# Patient Record
Sex: MALE | Race: WHITE | NOT HISPANIC OR LATINO | Employment: OTHER | URBAN - METROPOLITAN AREA
[De-identification: names, ages, dates, MRNs, and addresses within clinical notes are randomized per-mention and may not be internally consistent; named-entity substitution may affect disease eponyms.]

---

## 2020-08-08 ENCOUNTER — HOSPITAL ENCOUNTER (INPATIENT)
Facility: HOSPITAL | Age: 40
LOS: 3 days | Discharge: HOME | End: 2020-08-11
Attending: EMERGENCY MEDICINE | Admitting: INTERNAL MEDICINE

## 2020-08-08 ENCOUNTER — APPOINTMENT (OUTPATIENT)
Dept: GENERAL RADIOLOGY | Facility: HOSPITAL | Age: 40
End: 2020-08-08

## 2020-08-08 DIAGNOSIS — I10 UNCONTROLLED HYPERTENSION: ICD-10-CM

## 2020-08-08 DIAGNOSIS — I21.19 ACUTE MI, INFERIOR WALL (HCC): Primary | ICD-10-CM

## 2020-08-08 LAB
ALBUMIN SERPL-MCNC: 2.6 G/DL (ref 3.5–5.2)
ALBUMIN/GLOB SERPL: 0.9 G/DL
ALP SERPL-CCNC: 62 U/L (ref 39–117)
ALT SERPL W P-5'-P-CCNC: 12 U/L (ref 1–41)
ANION GAP SERPL CALCULATED.3IONS-SCNC: 11.3 MMOL/L (ref 5–15)
APTT PPP: 26.9 SECONDS (ref 22.7–35.4)
AST SERPL-CCNC: 27 U/L (ref 1–40)
B PARAPERT DNA SPEC QL NAA+PROBE: NOT DETECTED
B PERT DNA SPEC QL NAA+PROBE: NOT DETECTED
BASOPHILS # BLD AUTO: 0.07 10*3/MM3 (ref 0–0.2)
BASOPHILS NFR BLD AUTO: 0.7 % (ref 0–1.5)
BILIRUB SERPL-MCNC: <0.2 MG/DL (ref 0–1.2)
BUN SERPL-MCNC: 28 MG/DL (ref 6–20)
BUN/CREAT SERPL: 11.1 (ref 7–25)
C PNEUM DNA NPH QL NAA+NON-PROBE: NOT DETECTED
CALCIUM SPEC-SCNC: 9.1 MG/DL (ref 8.6–10.5)
CHLORIDE SERPL-SCNC: 102 MMOL/L (ref 98–107)
CO2 SERPL-SCNC: 24.7 MMOL/L (ref 22–29)
CREAT SERPL-MCNC: 2.53 MG/DL (ref 0.76–1.27)
DEPRECATED RDW RBC AUTO: 44.3 FL (ref 37–54)
EOSINOPHIL # BLD AUTO: 0.36 10*3/MM3 (ref 0–0.4)
EOSINOPHIL NFR BLD AUTO: 3.7 % (ref 0.3–6.2)
ERYTHROCYTE [DISTWIDTH] IN BLOOD BY AUTOMATED COUNT: 13.3 % (ref 12.3–15.4)
FLUAV H1 2009 PAND RNA NPH QL NAA+PROBE: NOT DETECTED
FLUAV H1 HA GENE NPH QL NAA+PROBE: NOT DETECTED
FLUAV H3 RNA NPH QL NAA+PROBE: NOT DETECTED
FLUAV SUBTYP SPEC NAA+PROBE: NOT DETECTED
FLUBV RNA ISLT QL NAA+PROBE: NOT DETECTED
GFR SERPL CREATININE-BSD FRML MDRD: 28 ML/MIN/1.73
GLOBULIN UR ELPH-MCNC: 2.9 GM/DL
GLUCOSE BLDC GLUCOMTR-MCNC: 144 MG/DL (ref 70–130)
GLUCOSE SERPL-MCNC: 140 MG/DL (ref 65–99)
HADV DNA SPEC NAA+PROBE: NOT DETECTED
HCOV 229E RNA SPEC QL NAA+PROBE: NOT DETECTED
HCOV HKU1 RNA SPEC QL NAA+PROBE: NOT DETECTED
HCOV NL63 RNA SPEC QL NAA+PROBE: NOT DETECTED
HCOV OC43 RNA SPEC QL NAA+PROBE: NOT DETECTED
HCT VFR BLD AUTO: 43 % (ref 37.5–51)
HGB BLD-MCNC: 14.6 G/DL (ref 13–17.7)
HMPV RNA NPH QL NAA+NON-PROBE: NOT DETECTED
HPIV1 RNA SPEC QL NAA+PROBE: NOT DETECTED
HPIV2 RNA SPEC QL NAA+PROBE: NOT DETECTED
HPIV3 RNA NPH QL NAA+PROBE: NOT DETECTED
HPIV4 P GENE NPH QL NAA+PROBE: NOT DETECTED
IMM GRANULOCYTES # BLD AUTO: 0.07 10*3/MM3 (ref 0–0.05)
IMM GRANULOCYTES NFR BLD AUTO: 0.7 % (ref 0–0.5)
INR PPP: 0.91 (ref 0.9–1.1)
LYMPHOCYTES # BLD AUTO: 1.2 10*3/MM3 (ref 0.7–3.1)
LYMPHOCYTES NFR BLD AUTO: 12.2 % (ref 19.6–45.3)
M PNEUMO IGG SER IA-ACNC: NOT DETECTED
MCH RBC QN AUTO: 30.6 PG (ref 26.6–33)
MCHC RBC AUTO-ENTMCNC: 34 G/DL (ref 31.5–35.7)
MCV RBC AUTO: 90.1 FL (ref 79–97)
MONOCYTES # BLD AUTO: 0.75 10*3/MM3 (ref 0.1–0.9)
MONOCYTES NFR BLD AUTO: 7.6 % (ref 5–12)
NEUTROPHILS NFR BLD AUTO: 7.4 10*3/MM3 (ref 1.7–7)
NEUTROPHILS NFR BLD AUTO: 75.1 % (ref 42.7–76)
NRBC BLD AUTO-RTO: 0 /100 WBC (ref 0–0.2)
PLATELET # BLD AUTO: 225 10*3/MM3 (ref 140–450)
PMV BLD AUTO: 10.1 FL (ref 6–12)
POTASSIUM SERPL-SCNC: 4.3 MMOL/L (ref 3.5–5.2)
PROT SERPL-MCNC: 5.5 G/DL (ref 6–8.5)
PROTHROMBIN TIME: 12.2 SECONDS (ref 11.7–14.2)
RBC # BLD AUTO: 4.77 10*6/MM3 (ref 4.14–5.8)
RHINOVIRUS RNA SPEC NAA+PROBE: NOT DETECTED
RSV RNA NPH QL NAA+NON-PROBE: NOT DETECTED
SARS-COV-2 RNA PNL SPEC NAA+PROBE: NOT DETECTED
SODIUM SERPL-SCNC: 138 MMOL/L (ref 136–145)
TROPONIN T SERPL-MCNC: <0.01 NG/ML (ref 0–0.03)
WBC # BLD AUTO: 9.85 10*3/MM3 (ref 3.4–10.8)

## 2020-08-08 PROCEDURE — 027034Z DILATION OF CORONARY ARTERY, ONE ARTERY WITH DRUG-ELUTING INTRALUMINAL DEVICE, PERCUTANEOUS APPROACH: ICD-10-PCS | Performed by: INTERNAL MEDICINE

## 2020-08-08 PROCEDURE — C9606 PERC D-E COR REVASC W AMI S: HCPCS | Performed by: INTERNAL MEDICINE

## 2020-08-08 PROCEDURE — 93005 ELECTROCARDIOGRAM TRACING: CPT | Performed by: EMERGENCY MEDICINE

## 2020-08-08 PROCEDURE — C1894 INTRO/SHEATH, NON-LASER: HCPCS | Performed by: INTERNAL MEDICINE

## 2020-08-08 PROCEDURE — C1874 STENT, COATED/COV W/DEL SYS: HCPCS | Performed by: INTERNAL MEDICINE

## 2020-08-08 PROCEDURE — 71045 X-RAY EXAM CHEST 1 VIEW: CPT

## 2020-08-08 PROCEDURE — 25010000002 HYDRALAZINE PER 20 MG: Performed by: INTERNAL MEDICINE

## 2020-08-08 PROCEDURE — 85347 COAGULATION TIME ACTIVATED: CPT

## 2020-08-08 PROCEDURE — 25010000002 FENTANYL CITRATE (PF) 100 MCG/2ML SOLUTION: Performed by: INTERNAL MEDICINE

## 2020-08-08 PROCEDURE — B2151ZZ FLUOROSCOPY OF LEFT HEART USING LOW OSMOLAR CONTRAST: ICD-10-PCS | Performed by: INTERNAL MEDICINE

## 2020-08-08 PROCEDURE — 25010000002 MIDAZOLAM PER 1 MG: Performed by: INTERNAL MEDICINE

## 2020-08-08 PROCEDURE — C1887 CATHETER, GUIDING: HCPCS | Performed by: INTERNAL MEDICINE

## 2020-08-08 PROCEDURE — 93010 ELECTROCARDIOGRAM REPORT: CPT | Performed by: INTERNAL MEDICINE

## 2020-08-08 PROCEDURE — 85610 PROTHROMBIN TIME: CPT | Performed by: EMERGENCY MEDICINE

## 2020-08-08 PROCEDURE — 99291 CRITICAL CARE FIRST HOUR: CPT | Performed by: INTERNAL MEDICINE

## 2020-08-08 PROCEDURE — C1725 CATH, TRANSLUMIN NON-LASER: HCPCS | Performed by: INTERNAL MEDICINE

## 2020-08-08 PROCEDURE — 80053 COMPREHEN METABOLIC PANEL: CPT | Performed by: EMERGENCY MEDICINE

## 2020-08-08 PROCEDURE — 0 IOPAMIDOL PER 1 ML: Performed by: INTERNAL MEDICINE

## 2020-08-08 PROCEDURE — 93458 L HRT ARTERY/VENTRICLE ANGIO: CPT | Performed by: INTERNAL MEDICINE

## 2020-08-08 PROCEDURE — 99284 EMERGENCY DEPT VISIT MOD MDM: CPT

## 2020-08-08 PROCEDURE — 85730 THROMBOPLASTIN TIME PARTIAL: CPT | Performed by: EMERGENCY MEDICINE

## 2020-08-08 PROCEDURE — 92941 PRQ TRLML REVSC TOT OCCL AMI: CPT | Performed by: INTERNAL MEDICINE

## 2020-08-08 PROCEDURE — C1769 GUIDE WIRE: HCPCS | Performed by: INTERNAL MEDICINE

## 2020-08-08 PROCEDURE — 0202U NFCT DS 22 TRGT SARS-COV-2: CPT | Performed by: EMERGENCY MEDICINE

## 2020-08-08 PROCEDURE — 4A023N7 MEASUREMENT OF CARDIAC SAMPLING AND PRESSURE, LEFT HEART, PERCUTANEOUS APPROACH: ICD-10-PCS | Performed by: INTERNAL MEDICINE

## 2020-08-08 PROCEDURE — 99152 MOD SED SAME PHYS/QHP 5/>YRS: CPT | Performed by: INTERNAL MEDICINE

## 2020-08-08 PROCEDURE — 84484 ASSAY OF TROPONIN QUANT: CPT | Performed by: EMERGENCY MEDICINE

## 2020-08-08 PROCEDURE — B2111ZZ FLUOROSCOPY OF MULTIPLE CORONARY ARTERIES USING LOW OSMOLAR CONTRAST: ICD-10-PCS | Performed by: INTERNAL MEDICINE

## 2020-08-08 PROCEDURE — 25010000002 HEPARIN (PORCINE) PER 1000 UNITS: Performed by: INTERNAL MEDICINE

## 2020-08-08 PROCEDURE — 85025 COMPLETE CBC W/AUTO DIFF WBC: CPT | Performed by: EMERGENCY MEDICINE

## 2020-08-08 PROCEDURE — 25010000002 HEPARIN (PORCINE) PER 1000 UNITS: Performed by: EMERGENCY MEDICINE

## 2020-08-08 PROCEDURE — 82962 GLUCOSE BLOOD TEST: CPT

## 2020-08-08 DEVICE — XIENCE SIERRA™ EVEROLIMUS ELUTING CORONARY STENT SYSTEM 3.50 MM X 23 MM / RAPID-EXCHANGE
Type: IMPLANTABLE DEVICE | Status: FUNCTIONAL
Brand: XIENCE SIERRA™

## 2020-08-08 RX ORDER — ASPIRIN 325 MG
325 TABLET, DELAYED RELEASE (ENTERIC COATED) ORAL DAILY
Status: DISCONTINUED | OUTPATIENT
Start: 2020-08-08 | End: 2020-08-11 | Stop reason: HOSPADM

## 2020-08-08 RX ORDER — BUPROPION HYDROCHLORIDE 200 MG/1
200 TABLET, EXTENDED RELEASE ORAL 2 TIMES DAILY
COMMUNITY
End: 2021-10-31 | Stop reason: SDUPTHER

## 2020-08-08 RX ORDER — HEPARIN SODIUM 5000 [USP'U]/ML
INJECTION, SOLUTION INTRAVENOUS; SUBCUTANEOUS
Status: COMPLETED | OUTPATIENT
Start: 2020-08-08 | End: 2020-08-08

## 2020-08-08 RX ORDER — LIDOCAINE HYDROCHLORIDE 20 MG/ML
INJECTION, SOLUTION INFILTRATION; PERINEURAL AS NEEDED
Status: DISCONTINUED | OUTPATIENT
Start: 2020-08-08 | End: 2020-08-08 | Stop reason: HOSPADM

## 2020-08-08 RX ORDER — FLUOXETINE HYDROCHLORIDE 20 MG/1
20 CAPSULE ORAL DAILY
Status: ON HOLD | COMMUNITY
End: 2020-08-09

## 2020-08-08 RX ORDER — SODIUM CHLORIDE 0.9 % (FLUSH) 0.9 %
10 SYRINGE (ML) INJECTION AS NEEDED
Status: DISCONTINUED | OUTPATIENT
Start: 2020-08-08 | End: 2020-08-11 | Stop reason: HOSPADM

## 2020-08-08 RX ORDER — FENTANYL CITRATE 50 UG/ML
INJECTION, SOLUTION INTRAMUSCULAR; INTRAVENOUS AS NEEDED
Status: DISCONTINUED | OUTPATIENT
Start: 2020-08-08 | End: 2020-08-08 | Stop reason: HOSPADM

## 2020-08-08 RX ORDER — NITROGLYCERIN 20 MG/100ML
INJECTION INTRAVENOUS CONTINUOUS PRN
Status: COMPLETED | OUTPATIENT
Start: 2020-08-08 | End: 2020-08-08

## 2020-08-08 RX ORDER — OLANZAPINE 15 MG/1
15 TABLET ORAL DAILY
Status: ON HOLD | COMMUNITY
End: 2020-08-09

## 2020-08-08 RX ORDER — DULOXETIN HYDROCHLORIDE 60 MG/1
120 CAPSULE, DELAYED RELEASE ORAL DAILY
COMMUNITY
End: 2021-10-31 | Stop reason: SDUPTHER

## 2020-08-08 RX ORDER — SODIUM CHLORIDE 9 MG/ML
INJECTION, SOLUTION INTRAVENOUS CONTINUOUS PRN
Status: COMPLETED | OUTPATIENT
Start: 2020-08-08 | End: 2020-08-08

## 2020-08-08 RX ORDER — LOSARTAN POTASSIUM 100 MG/1
100 TABLET ORAL DAILY
COMMUNITY
End: 2020-08-11 | Stop reason: HOSPADM

## 2020-08-08 RX ORDER — MIDAZOLAM HYDROCHLORIDE 1 MG/ML
INJECTION INTRAMUSCULAR; INTRAVENOUS AS NEEDED
Status: DISCONTINUED | OUTPATIENT
Start: 2020-08-08 | End: 2020-08-08 | Stop reason: HOSPADM

## 2020-08-08 RX ORDER — ACETAMINOPHEN 325 MG/1
650 TABLET ORAL EVERY 4 HOURS PRN
Status: DISCONTINUED | OUTPATIENT
Start: 2020-08-08 | End: 2020-08-11 | Stop reason: HOSPADM

## 2020-08-08 RX ORDER — HYDRALAZINE HYDROCHLORIDE 20 MG/ML
INJECTION INTRAMUSCULAR; INTRAVENOUS AS NEEDED
Status: DISCONTINUED | OUTPATIENT
Start: 2020-08-08 | End: 2020-08-08 | Stop reason: HOSPADM

## 2020-08-08 RX ORDER — NITROGLYCERIN 20 MG/100ML
INJECTION INTRAVENOUS
Status: COMPLETED | OUTPATIENT
Start: 2020-08-08 | End: 2020-08-08

## 2020-08-08 RX ORDER — LAMOTRIGINE 200 MG/1
200 TABLET ORAL DAILY
COMMUNITY

## 2020-08-08 RX ORDER — CLOPIDOGREL BISULFATE 75 MG/1
TABLET ORAL
Status: COMPLETED | OUTPATIENT
Start: 2020-08-08 | End: 2020-08-08

## 2020-08-08 RX ORDER — ASENAPINE 5 MG/1
10 TABLET SUBLINGUAL NIGHTLY
COMMUNITY
End: 2021-10-31 | Stop reason: SDUPTHER

## 2020-08-08 RX ORDER — CLOPIDOGREL BISULFATE 75 MG/1
75 TABLET ORAL DAILY
Status: DISCONTINUED | OUTPATIENT
Start: 2020-08-09 | End: 2020-08-11 | Stop reason: HOSPADM

## 2020-08-08 RX ORDER — HEPARIN SODIUM 1000 [USP'U]/ML
INJECTION, SOLUTION INTRAVENOUS; SUBCUTANEOUS AS NEEDED
Status: DISCONTINUED | OUTPATIENT
Start: 2020-08-08 | End: 2020-08-08 | Stop reason: HOSPADM

## 2020-08-08 RX ORDER — LOSARTAN POTASSIUM 100 MG/1
100 TABLET ORAL DAILY
Status: DISCONTINUED | OUTPATIENT
Start: 2020-08-08 | End: 2020-08-09

## 2020-08-08 RX ADMIN — HEPARIN SODIUM 5000 UNITS: 5000 INJECTION, SOLUTION INTRAVENOUS; SUBCUTANEOUS at 17:09

## 2020-08-08 RX ADMIN — ACETAMINOPHEN 650 MG: 325 TABLET, FILM COATED ORAL at 20:55

## 2020-08-08 RX ADMIN — CLOPIDOGREL BISULFATE 600 MG: 75 TABLET ORAL at 17:10

## 2020-08-08 RX ADMIN — NITROGLYCERIN 10 MCG/MIN: 20 INJECTION INTRAVENOUS at 17:10

## 2020-08-08 RX ADMIN — LOSARTAN POTASSIUM 100 MG: 100 TABLET, FILM COATED ORAL at 21:00

## 2020-08-08 NOTE — ED TRIAGE NOTES
Cp x 1 hour.  He was walking.  Radiates to right shoulder.  Aspirin and 2 nitro on ems.  Is on a new med - saphris - started last week    Patient was placed in face mask during first look triage.  Patient was wearing a face mask throughout encounter.  I wore personal protective equipment throughout the encounter.  Hand hygiene was performed before and after patient encounter.

## 2020-08-08 NOTE — H&P
Patient Care Team:  Efren Ramos MD as PCP - General    Chief complaint chest pain    Subjective     Patient is a 40 y.o. male presents with retrosternal pressure. Onset of symptoms was abrupt starting several hours ago.  Symptoms are associated with shortness of breath.  Symptoms are aggravated by none.   Symptoms improve with none. Severity 10/10 Context at rest quality pressure    Review of Systems   Could not be obtained because of the severity of the care    History  No past medical history on file.  No past surgical history on file.  No family history on file.  Social History     Tobacco Use   • Smoking status: Current Every Day Smoker   Substance Use Topics   • Alcohol use: No   • Drug use: No     Medications Prior to Admission   Medication Sig Dispense Refill Last Dose   • asenapine maleate (SAPHRIS) 5 MG sublingual tablet sublingual tablet Place 5 mg under the tongue 2 (Two) Times a Day.      • Brexpiprazole (Rexulti) 3 MG tablet Take 3 mg by mouth Daily.      • buPROPion SR (Wellbutrin SR) 200 MG 12 hr tablet Take 200 mg by mouth 2 (Two) Times a Day.      • DULoxetine (CYMBALTA) 60 MG capsule Take 120 mg by mouth Daily.      • FLUoxetine (PROzac) 20 MG capsule Take 20 mg by mouth Daily.      • lamoTRIgine (LaMICtal) 200 MG tablet Take 400 mg by mouth Daily.      • losartan (COZAAR) 100 MG tablet Take 100 mg by mouth Daily.      • metFORMIN (GLUCOPHAGE) 500 MG tablet Take 500 mg by mouth 2 (Two) Times a Day With Meals.      • OLANZapine (ZyPREXA) 15 MG tablet Take 15 mg by mouth Daily.        Allergies:  Patient has no known allergies.    Objective     Vital Signs  Temp:  [96.2 °F (35.7 °C)] 96.2 °F (35.7 °C)  Heart Rate:  [86-92] 87  Resp:  [14-18] 16  BP: (177-225)/(115-131) 177/115    Physical Exam:      General Appearance:   Patient in severe distress pale   Head:    Normocephalic, without obvious abnormality, atraumatic   Eyes:            Lids and lashes normal, conjunctivae and sclerae  normal, no   icterus, no pallor, corneas clear, PERRLA   Ears:    Ears appear intact with no abnormalities noted   Throat:   No oral lesions, no thrush, oral mucosa moist   Neck:   No adenopathy, supple, trachea midline, no thyromegaly, no   carotid bruit, no JVD   Back:     No kyphosis present, no scoliosis present, no skin lesions,      erythema or scars, no tenderness to percussion or                   palpation,   range of motion normal   Lungs:     Clear to auscultation,respirations regular, even and                  unlabored    Heart:    Regular rhythm and normal rate, normal S1 and S2, no            murmur, no gallop, no rub, no click   Chest Wall:    No abnormalities observed   Abdomen:     Normal bowel sounds, no masses, no organomegaly, soft        non-tender, non-distended, no guarding, no rebound                tenderness   Rectal:     Deferred   Extremities:   Moves all extremities well, no edema, no cyanosis, no             redness   Pulses:   Pulses palpable and equal bilaterally   Skin:   No bleeding, bruising or rash               Results Review:    I reviewed the patient's new clinical results.    Assessment/Plan       Acute MI, inferior wall (CMS/HCC)      Diabetes mellitus  Uncontrolled hypertension    We will start the nitroglycerin drip repeat EKGs and scan cardiac enzymes considering the ST elevation inferiorly patient will be taken to the Cath Lab procedure risk and options has been explained    I discussed the patients findings and my recommendations with patient.     Jason Carvalho MD  08/08/20  18:18    Time: Critical care 30 min

## 2020-08-08 NOTE — ED PROVIDER NOTES
EMERGENCY DEPARTMENT ENCOUNTER    CHIEF COMPLAINT  Chief Complaint: Chest pain  History given by: Patient  History limited by: None  Room Number: [facility]  PMD: Efren Ramos MD      HPI:  Pt is a 40 y.o. male who presents complaining of right-sided chest pain that began 15 minutes prior to arrival.  Patient states it is a pressure, pain that radiates down his right arm.  He denies shortness of breath, nausea or vomiting.  He did become diaphoretic after onset of pain.  He called EMS and they gave him 324 mg of aspirin and 3 nitroglycerin which did not relieve his pain.  Currently, patient states his pain is an 8 out of 10.  He states he is a diabetic and has a history of high blood pressure.  Also states that his mother has a history of coronary artery disease.    Patient was placed in face mask in first look. Patient was wearing facemask when I entered the room and throughout our encounter. I wore full protective equipment throughout this patient encounter including a face mask, eye shield and gloves. Hand hygiene was performed before donning protective equipment and after removal when leaving the room.        PAST MEDICAL HISTORY  Active Ambulatory Problems     Diagnosis Date Noted   • No Active Ambulatory Problems     Resolved Ambulatory Problems     Diagnosis Date Noted   • No Resolved Ambulatory Problems     No Additional Past Medical History       PAST SURGICAL HISTORY  No past surgical history on file.    FAMILY HISTORY  No family history on file.    SOCIAL HISTORY  Social History     Socioeconomic History   • Marital status: Single     Spouse name: Not on file   • Number of children: Not on file   • Years of education: Not on file   • Highest education level: Not on file   Tobacco Use   • Smoking status: Current Every Day Smoker   Substance and Sexual Activity   • Alcohol use: No   • Drug use: No       ALLERGIES  Patient has no known allergies.    REVIEW OF SYSTEMS  Review of Systems    Constitutional: Negative for chills and fever.   Respiratory: Negative for shortness of breath.    Cardiovascular: Positive for chest pain. Negative for palpitations and leg swelling.   Gastrointestinal: Negative for nausea and vomiting.       PHYSICAL EXAM  ED Triage Vitals [08/08/20 1654]   Temp Heart Rate Resp BP SpO2   -- 91 18 (!) 225/130 96 %      Temp src Heart Rate Source Patient Position BP Location FiO2 (%)   -- Monitor -- -- --       Physical Exam   Constitutional: He is oriented to person, place, and time. He appears distressed (moderate).   HENT:   Head: Atraumatic.   Eyes: EOM are normal.   Neck: Normal range of motion. Neck supple. No JVD present. No tracheal deviation present.   Cardiovascular: Normal rate, regular rhythm, normal heart sounds and intact distal pulses.   Pulmonary/Chest: Effort normal and breath sounds normal. No respiratory distress. He has no wheezes. He has no rales.   Abdominal: Soft. Bowel sounds are normal. He exhibits no distension. There is no tenderness. There is no rebound and no guarding.   Musculoskeletal: Normal range of motion. He exhibits no edema or tenderness.   Lymphadenopathy:     He has no cervical adenopathy.   Neurological: He is alert and oriented to person, place, and time.   Skin: Skin is warm. He is diaphoretic. No erythema.   Nursing note and vitals reviewed.      LAB RESULTS  Lab Results (last 24 hours)     Procedure Component Value Units Date/Time    CBC & Differential [[id number]] Collected:  08/08/20 1715    Specimen:  Blood Updated:  08/08/20 1727    Narrative:       The following orders were created for panel order CBC & Differential.  Procedure                               Abnormality         Status                     ---------                               -----------         ------                     CBC Auto Differential[[id number]]        Abnormal            Final result                 Please view results for these tests on the individual  orders.    Protime-INR [[id number]]  (Normal) Collected:  08/08/20 1715    Specimen:  Blood Updated:  08/08/20 1743     Protime 12.2 Seconds      INR 0.91    aPTT [[id number]]  (Normal) Collected:  08/08/20 1715    Specimen:  Blood Updated:  08/08/20 1743     PTT 26.9 seconds     Comprehensive Metabolic Panel [[id number]]  (Abnormal) Collected:  08/08/20 1715    Specimen:  Blood Updated:  08/08/20 1748     Glucose 140 mg/dL      BUN 28 mg/dL      Creatinine 2.53 mg/dL      Sodium 138 mmol/L      Potassium 4.3 mmol/L      Chloride 102 mmol/L      CO2 24.7 mmol/L      Calcium 9.1 mg/dL      Total Protein 5.5 g/dL      Albumin 2.60 g/dL      ALT (SGPT) 12 U/L      AST (SGOT) 27 U/L      Alkaline Phosphatase 62 U/L      Total Bilirubin <0.2 mg/dL      eGFR Non African Amer 28 mL/min/1.73      Globulin 2.9 gm/dL      A/G Ratio 0.9 g/dL      BUN/Creatinine Ratio 11.1     Anion Gap 11.3 mmol/L     Narrative:       GFR Normal >60  Chronic Kidney Disease <60  Kidney Failure <15      Troponin [[id number]]  (Normal) Collected:  08/08/20 1715    Specimen:  Blood Updated:  08/08/20 1748     Troponin T <0.010 ng/mL     Narrative:       Troponin T Reference Range:  <= 0.03 ng/mL-   Negative for AMI  >0.03 ng/mL-     Abnormal for myocardial necrosis.  Clinicians would have to utilize clinical acumen, EKG, Troponin and serial changes to determine if it is an Acute Myocardial Infarction or myocardial injury due to an underlying chronic condition.       Results may be falsely decreased if patient taking Biotin.      CBC Auto Differential [[id number]]  (Abnormal) Collected:  08/08/20 1715    Specimen:  Blood Updated:  08/08/20 1727     WBC 9.85 10*3/mm3      RBC 4.77 10*6/mm3      Hemoglobin 14.6 g/dL      Hematocrit 43.0 %      MCV 90.1 fL      MCH 30.6 pg      MCHC 34.0 g/dL      RDW 13.3 %      RDW-SD 44.3 fl      MPV 10.1 fL      Platelets 225 10*3/mm3      Neutrophil % 75.1 %      Lymphocyte % 12.2 %      Monocyte % 7.6 %       Eosinophil % 3.7 %      Basophil % 0.7 %      Immature Grans % 0.7 %      Neutrophils, Absolute 7.40 10*3/mm3      Lymphocytes, Absolute 1.20 10*3/mm3      Monocytes, Absolute 0.75 10*3/mm3      Eosinophils, Absolute 0.36 10*3/mm3      Basophils, Absolute 0.07 10*3/mm3      Immature Grans, Absolute 0.07 10*3/mm3      nRBC 0.0 /100 WBC     COVID PRE-OP / PRE-PROCEDURE SCREENING ORDER (NO ISOLATION) - Swab, Nasopharynx [[id number]] Collected:  08/08/20 1730    Specimen:  Swab from Nasopharynx Updated:  08/08/20 1743    Narrative:       The following orders were created for panel order COVID PRE-OP / PRE-PROCEDURE SCREENING ORDER (NO ISOLATION) - Swab, Nasopharynx.  Procedure                               Abnormality         Status                     ---------                               -----------         ------                     Respiratory Panel PCR w/...[[id number]]                      In process                   Please view results for these tests on the individual orders.    Respiratory Panel PCR w/COVID-19(SARS-CoV-2) [facility] In-House, NP Swab in UTM/VTP, 3-4 Hr TAT - Swab, Nasopharynx [[id number]] Collected:  08/08/20 1730    Specimen:  Swab from Nasopharynx Updated:  08/08/20 1743          I ordered the above labs and reviewed the results    RADIOLOGY  XR Chest 1 View    (Results Pending)   My wet read of the chest x-ray reveals no acute disease.    I ordered the above noted radiological studies. Interpreted by radiologist. Reviewed by me in PACS.       PROCEDURES  Critical Care  Performed by: Jai Carter MD  Authorized by: Jai Carter MD     Critical care provider statement:     Critical care time (minutes):  25    Critical care was necessary to treat or prevent imminent or life-threatening deterioration of the following conditions:  Cardiac failure    Critical care was time spent personally by me on the following activities:  Discussions with consultants, evaluation of patient's response  to treatment, examination of patient, interpretation of cardiac output measurements, obtaining history from patient or surrogate, ordering and review of laboratory studies, ordering and review of radiographic studies, pulse oximetry and re-evaluation of patient's condition      EKG          EKG time: 1700  Rhythm/Rate: Normal sinus rhythm, rate 81  P waves and RI: Normal  QRS, axis: Normal  ST and T waves: ST elevations inferiorly with inverted T wave in aVL.  There is an acute inferior MI    Interpreted Contemporaneously by me, independently viewed  No previous EKG      PROGRESS AND CONSULTS  ED Course as of Aug 08 1751   Sat Aug 08, 2020   1709 I discussed the case with Dr. Carvalho, interventional cardiology.  He is coming to the hospital to take patient to the Cath Lab for emergent intervention.    [DE]   1714 Patient has been updated about the plan to take patient to the Cath Lab.  Nitroglycerin drip has been started and he states his pain is an 8 out of 10.  He has received 5000 units of heparin IV bolus along with 300 of Plavix.  Patient has already had aspirin.    [DE]      ED Course User Index  [DE] Jai Carter MD         MEDICAL DECISION MAKING  Results were reviewed/discussed with the patient and they were also made aware of online access. Pt also made aware that some labs, such as cultures, will not be resulted during ER visit and follow up with PMD is necessary.     MDM       DIAGNOSIS  Final diagnoses:   Acute MI, inferior wall (CMS/HCC)   Uncontrolled hypertension       DISPOSITION  To Cath Lab    Latest Documented Vital Signs:  As of 17:51  BP- (!) 177/115 HR- 87 Temp- 96.2 °F (35.7 °C) (Tympanic) O2 sat- 97%    --  Dragon disclaimer:   Much of this encounter note is an electronic transcription/translation of spoken language to printed text.  The electronic translation of spoken language may permit erroneous, or at times, nonsensical words or phrases to be inadvertently transcribed.   Although I have reviewed the note for such areas, some may still exist.       Jai Carter MD  08/08/20 [id number]

## 2020-08-09 ENCOUNTER — APPOINTMENT (OUTPATIENT)
Dept: CARDIOLOGY | Facility: HOSPITAL | Age: 40
End: 2020-08-09

## 2020-08-09 LAB
ANION GAP SERPL CALCULATED.3IONS-SCNC: 10.4 MMOL/L (ref 5–15)
ARTICHOKE IGE QN: 222 MG/DL (ref 0–100)
BUN SERPL-MCNC: 28 MG/DL (ref 6–20)
BUN/CREAT SERPL: 13.1 (ref 7–25)
CALCIUM SPEC-SCNC: 8.8 MG/DL (ref 8.6–10.5)
CHLORIDE SERPL-SCNC: 103 MMOL/L (ref 98–107)
CHOLEST SERPL-MCNC: 297 MG/DL (ref 0–200)
CO2 SERPL-SCNC: 20.6 MMOL/L (ref 22–29)
CREAT SERPL-MCNC: 2.14 MG/DL (ref 0.76–1.27)
DEPRECATED RDW RBC AUTO: 40.5 FL (ref 37–54)
ERYTHROCYTE [DISTWIDTH] IN BLOOD BY AUTOMATED COUNT: 13.1 % (ref 12.3–15.4)
GFR SERPL CREATININE-BSD FRML MDRD: 34 ML/MIN/1.73
GLUCOSE BLDC GLUCOMTR-MCNC: 129 MG/DL (ref 70–130)
GLUCOSE BLDC GLUCOMTR-MCNC: 136 MG/DL (ref 70–130)
GLUCOSE BLDC GLUCOMTR-MCNC: 153 MG/DL (ref 70–130)
GLUCOSE SERPL-MCNC: 115 MG/DL (ref 65–99)
HBA1C MFR BLD: 6 % (ref 4.8–5.6)
HCT VFR BLD AUTO: 41.1 % (ref 37.5–51)
HDLC SERPL-MCNC: 40 MG/DL (ref 40–60)
HGB BLD-MCNC: 14.5 G/DL (ref 13–17.7)
LDLC SERPL CALC-MCNC: ABNORMAL MG/DL
LDLC/HDLC SERPL: ABNORMAL {RATIO}
MCH RBC QN AUTO: 30.1 PG (ref 26.6–33)
MCHC RBC AUTO-ENTMCNC: 35.3 G/DL (ref 31.5–35.7)
MCV RBC AUTO: 85.4 FL (ref 79–97)
PLATELET # BLD AUTO: 290 10*3/MM3 (ref 140–450)
PMV BLD AUTO: 10.3 FL (ref 6–12)
POTASSIUM SERPL-SCNC: 3.9 MMOL/L (ref 3.5–5.2)
RBC # BLD AUTO: 4.81 10*6/MM3 (ref 4.14–5.8)
SODIUM SERPL-SCNC: 134 MMOL/L (ref 136–145)
TRIGL SERPL-MCNC: 411 MG/DL (ref 0–150)
VLDLC SERPL-MCNC: ABNORMAL MG/DL
WBC # BLD AUTO: 12.28 10*3/MM3 (ref 3.4–10.8)

## 2020-08-09 PROCEDURE — 25810000003 SODIUM CHLORIDE 0.9 % WITH KCL 20 MEQ 20-0.9 MEQ/L-% SOLUTION: Performed by: HOSPITALIST

## 2020-08-09 PROCEDURE — 80061 LIPID PANEL: CPT | Performed by: INTERNAL MEDICINE

## 2020-08-09 PROCEDURE — 83721 ASSAY OF BLOOD LIPOPROTEIN: CPT | Performed by: INTERNAL MEDICINE

## 2020-08-09 PROCEDURE — 93306 TTE W/DOPPLER COMPLETE: CPT

## 2020-08-09 PROCEDURE — 85027 COMPLETE CBC AUTOMATED: CPT | Performed by: INTERNAL MEDICINE

## 2020-08-09 PROCEDURE — 80048 BASIC METABOLIC PNL TOTAL CA: CPT | Performed by: INTERNAL MEDICINE

## 2020-08-09 PROCEDURE — 99232 SBSQ HOSP IP/OBS MODERATE 35: CPT | Performed by: INTERNAL MEDICINE

## 2020-08-09 PROCEDURE — 82962 GLUCOSE BLOOD TEST: CPT

## 2020-08-09 PROCEDURE — 93005 ELECTROCARDIOGRAM TRACING: CPT | Performed by: INTERNAL MEDICINE

## 2020-08-09 PROCEDURE — 93306 TTE W/DOPPLER COMPLETE: CPT | Performed by: INTERNAL MEDICINE

## 2020-08-09 PROCEDURE — 25010000002 PERFLUTREN (DEFINITY) 8.476 MG IN SODIUM CHLORIDE 0.9 % 10 ML INJECTION: Performed by: INTERNAL MEDICINE

## 2020-08-09 PROCEDURE — 93010 ELECTROCARDIOGRAM REPORT: CPT | Performed by: INTERNAL MEDICINE

## 2020-08-09 PROCEDURE — 83036 HEMOGLOBIN GLYCOSYLATED A1C: CPT | Performed by: INTERNAL MEDICINE

## 2020-08-09 RX ORDER — BUPROPION HYDROCHLORIDE 100 MG/1
200 TABLET, EXTENDED RELEASE ORAL EVERY 12 HOURS SCHEDULED
Status: DISCONTINUED | OUTPATIENT
Start: 2020-08-09 | End: 2020-08-09

## 2020-08-09 RX ORDER — FLUOXETINE HYDROCHLORIDE 20 MG/1
100 CAPSULE ORAL DAILY
COMMUNITY
End: 2021-10-31 | Stop reason: SDUPTHER

## 2020-08-09 RX ORDER — LAMOTRIGINE 100 MG/1
400 TABLET ORAL DAILY
Status: DISCONTINUED | OUTPATIENT
Start: 2020-08-09 | End: 2020-08-09

## 2020-08-09 RX ORDER — DULOXETIN HYDROCHLORIDE 60 MG/1
120 CAPSULE, DELAYED RELEASE ORAL DAILY
Status: DISCONTINUED | OUTPATIENT
Start: 2020-08-09 | End: 2020-08-09

## 2020-08-09 RX ORDER — ASENAPINE 5 MG/1
5 TABLET SUBLINGUAL 2 TIMES DAILY
Status: DISCONTINUED | OUTPATIENT
Start: 2020-08-09 | End: 2020-08-09

## 2020-08-09 RX ORDER — FLUOXETINE HYDROCHLORIDE 20 MG/1
20 CAPSULE ORAL DAILY
Status: DISCONTINUED | OUTPATIENT
Start: 2020-08-09 | End: 2020-08-09

## 2020-08-09 RX ORDER — SODIUM CHLORIDE AND POTASSIUM CHLORIDE 150; 900 MG/100ML; MG/100ML
75 INJECTION, SOLUTION INTRAVENOUS CONTINUOUS
Status: DISCONTINUED | OUTPATIENT
Start: 2020-08-09 | End: 2020-08-09

## 2020-08-09 RX ORDER — ASENAPINE 5 MG/1
5 TABLET SUBLINGUAL NIGHTLY
Status: DISCONTINUED | OUTPATIENT
Start: 2020-08-09 | End: 2020-08-11 | Stop reason: HOSPADM

## 2020-08-09 RX ORDER — OLANZAPINE 7.5 MG/1
15 TABLET ORAL NIGHTLY
Status: DISCONTINUED | OUTPATIENT
Start: 2020-08-09 | End: 2020-08-09

## 2020-08-09 RX ORDER — LAMOTRIGINE 200 MG/1
400 TABLET ORAL DAILY
Status: DISCONTINUED | OUTPATIENT
Start: 2020-08-09 | End: 2020-08-11 | Stop reason: HOSPADM

## 2020-08-09 RX ORDER — LAMOTRIGINE 100 MG/1
200 TABLET ORAL EVERY 12 HOURS SCHEDULED
Status: DISCONTINUED | OUTPATIENT
Start: 2020-08-09 | End: 2020-08-09

## 2020-08-09 RX ORDER — FAMOTIDINE 20 MG/1
20 TABLET, FILM COATED ORAL 2 TIMES DAILY
Status: DISCONTINUED | OUTPATIENT
Start: 2020-08-09 | End: 2020-08-11 | Stop reason: HOSPADM

## 2020-08-09 RX ORDER — AMLODIPINE BESYLATE 5 MG/1
5 TABLET ORAL
Status: DISCONTINUED | OUTPATIENT
Start: 2020-08-09 | End: 2020-08-09

## 2020-08-09 RX ORDER — BUPROPION HYDROCHLORIDE 200 MG/1
200 TABLET, EXTENDED RELEASE ORAL EVERY 12 HOURS SCHEDULED
Status: DISCONTINUED | OUTPATIENT
Start: 2020-08-09 | End: 2020-08-11 | Stop reason: HOSPADM

## 2020-08-09 RX ORDER — AMLODIPINE BESYLATE 10 MG/1
10 TABLET ORAL
Status: DISCONTINUED | OUTPATIENT
Start: 2020-08-09 | End: 2020-08-11 | Stop reason: HOSPADM

## 2020-08-09 RX ORDER — DULOXETIN HYDROCHLORIDE 60 MG/1
120 CAPSULE, DELAYED RELEASE ORAL DAILY
Status: DISCONTINUED | OUTPATIENT
Start: 2020-08-09 | End: 2020-08-11 | Stop reason: HOSPADM

## 2020-08-09 RX ORDER — BUPROPION HYDROCHLORIDE 100 MG/1
200 TABLET ORAL 2 TIMES DAILY
Status: DISCONTINUED | OUTPATIENT
Start: 2020-08-09 | End: 2020-08-09

## 2020-08-09 RX ORDER — FLUOXETINE HYDROCHLORIDE 20 MG/1
100 CAPSULE ORAL DAILY
Status: DISCONTINUED | OUTPATIENT
Start: 2020-08-09 | End: 2020-08-11 | Stop reason: HOSPADM

## 2020-08-09 RX ADMIN — FAMOTIDINE 20 MG: 20 TABLET, FILM COATED ORAL at 20:20

## 2020-08-09 RX ADMIN — AMLODIPINE BESYLATE 10 MG: 10 TABLET ORAL at 14:03

## 2020-08-09 RX ADMIN — LAMOTRIGINE 400 MG: 200 TABLET ORAL at 09:10

## 2020-08-09 RX ADMIN — METOPROLOL TARTRATE 25 MG: 25 TABLET, FILM COATED ORAL at 07:24

## 2020-08-09 RX ADMIN — BREXPIPRAZOLE 3 MG: 3 TABLET ORAL at 09:08

## 2020-08-09 RX ADMIN — BUPROPION HYDROCHLORIDE 200 MG: 200 TABLET, EXTENDED RELEASE ORAL at 20:20

## 2020-08-09 RX ADMIN — ASPIRIN 325 MG: 325 TABLET, COATED ORAL at 07:21

## 2020-08-09 RX ADMIN — PERFLUTREN 2 ML: 6.52 INJECTION, SUSPENSION INTRAVENOUS at 14:40

## 2020-08-09 RX ADMIN — METOPROLOL TARTRATE 25 MG: 25 TABLET, FILM COATED ORAL at 00:45

## 2020-08-09 RX ADMIN — FLUOXETINE HYDROCHLORIDE 100 MG: 20 CAPSULE ORAL at 09:10

## 2020-08-09 RX ADMIN — BUPROPION HYDROCHLORIDE 200 MG: 100 TABLET, FILM COATED ORAL at 09:09

## 2020-08-09 RX ADMIN — LOSARTAN POTASSIUM 100 MG: 100 TABLET, FILM COATED ORAL at 07:21

## 2020-08-09 RX ADMIN — POTASSIUM CHLORIDE AND SODIUM CHLORIDE 75 ML/HR: 900; 150 INJECTION, SOLUTION INTRAVENOUS at 15:37

## 2020-08-09 RX ADMIN — CLOPIDOGREL 75 MG: 75 TABLET, FILM COATED ORAL at 07:22

## 2020-08-09 RX ADMIN — METOPROLOL TARTRATE 25 MG: 25 TABLET, FILM COATED ORAL at 20:20

## 2020-08-09 RX ADMIN — DULOXETINE HYDROCHLORIDE 120 MG: 60 CAPSULE, DELAYED RELEASE ORAL at 09:10

## 2020-08-09 RX ADMIN — SODIUM BICARBONATE: 84 INJECTION, SOLUTION INTRAVENOUS at 22:03

## 2020-08-09 RX ADMIN — ASENAPINE MALEATE 5 MG: 5 TABLET SUBLINGUAL at 22:02

## 2020-08-09 NOTE — CONSULTS
Thank you very much for the consult    Reason For The Consult: elevated bun /cr    HPI:40 year old white gentlemen with h/o; metabolic syndrome for 6 years , has been on metformin , admitted with chest pain and found to have abnormal serum creatinine. Un aware of any ckd. Denies urinary complains , creatinine elevated to 2.1. Already underwent cath and stent palcement. Feeling better. Report he take nsaids occasionally for aches and pains .  PMH: No past medical history on file. No past surgical history on file.  FHX: No family history on file.  SHX:   Social History     Substance and Sexual Activity   Alcohol Use No      Social History     Tobacco Use   Smoking Status Current Every Day Smoker   • Packs/day: 1.50   • Types: Cigarettes   Smokeless Tobacco Never Used      Social History     Substance and Sexual Activity   Drug Use No    no drugs    Home Medications:   Medications Prior to Admission   Medication Sig Dispense Refill Last Dose   • asenapine maleate (SAPHRIS) 5 MG sublingual tablet sublingual tablet Place 5 mg under the tongue Every Night.      • Brexpiprazole (Rexulti) 3 MG tablet Take 3 mg by mouth Daily.      • buPROPion SR (Wellbutrin SR) 200 MG 12 hr tablet Take 200 mg by mouth 2 (Two) Times a Day.      • DULoxetine (CYMBALTA) 60 MG capsule Take 120 mg by mouth Daily.      • lamoTRIgine (LaMICtal) 200 MG tablet Take 400 mg by mouth Daily.      • losartan (COZAAR) 100 MG tablet Take 100 mg by mouth Daily.      • metFORMIN (GLUCOPHAGE) 500 MG tablet Take 500 mg by mouth 2 (Two) Times a Day With Meals.      • FLUoxetine (PROzac) 20 MG capsule Take 100 mg by mouth Daily.          Allergies: No Known Allergies    Physical Exam:  General: in Nad    Vital Signs  Vitals:    08/09/20 1630   BP: 139/95   Pulse: 72   Resp:    Temp:    SpO2: 96%     I/O this shift:  In: 1325 [P.O.:1200; I.V.:125]  Out: 650 [Urine:650]  I/O last 3 completed shifts:  In: 841 [P.O.:360; I.V.:481]  Out: 1300  [Urine:1300]      HEENT:  Normo cephalic, Atraumatic, EOMI, PERRLA, NO icterus,  Neck:  Supple, No JVD, No Carotid artery bruit, No lymphadenopathy  Chest: Clear to auscultation bilaterally,   Cardiovascular: Regular rate and rhythm. Positive S1 & S2, No rub, murmur or gallop.  Abdominal: Soft, non tender, no palpable organomegaly, no abdominal bruit, positive bowel sounds  Musculoskeletal: No joint tenderness or swelling, good range of motion, Adequate muscle strength on both sides, no cyanosis, clubbing or edema on lower extremities.  Neuro: Alert oriented x3, CN1 - 12 intact, No focal sensory deficits or motor deficit      Review of Systems:   CNS: Denied headaches, blurred vision, tingling or numbness in any part of body. No weakness or any impaired speech.  CVS: No chest pain or shortness of breath, No orthopnea or PND or exertional dyspnea,  Pulmonary: Denies shortness of breath, coughs, hematemesis, night sweats or weight loss.  GI: Denies diarrhea, nausea, vomiting. Denies weight loss, hematemesis, melena.  : Denies dysuria, frequency or hesitancy of urination  Vascular: Denies claudication, resting pain, tingling numbness or weakness in any part of the body.  Musculoskeletal: Denies Joint tenderness or pain, denies stiffness in joints, denies fever or weight loss, or skin rashes.    Current Labs  [unfilled]  Lab Results (last 24 hours)     Procedure Component Value Units Date/Time    POC Glucose Once [[id number]]  (Abnormal) Collected:  08/09/20 1603    Specimen:  Blood Updated:  08/09/20 1604     Glucose 136 mg/dL     POC Glucose Once [[id number]]  (Abnormal) Collected:  08/09/20 1114    Specimen:  Blood Updated:  08/09/20 1118     Glucose 153 mg/dL     POC Glucose Once [[id number]]  (Normal) Collected:  08/09/20 0716    Specimen:  Blood Updated:  08/09/20 0717     Glucose 129 mg/dL     LDL Cholesterol, Direct [[id number]]  (Abnormal) Collected:  08/09/20 0358    Specimen:  Blood Updated:  08/09/20 0616      LDL Cholesterol  222 mg/dL     Narrative:       LDL Reference Ranges    (U.S. Department of Health and Human Services ATP III Classifications)    Optimal          <100 mg/dl  Near Optimal     100-129 mg/dl  Borderline High  130-159 mg/dl  High             160-189 mg/dl  Very High        >189 mg/dl      Basic Metabolic Panel [[id number]]  (Abnormal) Collected:  08/09/20 0358    Specimen:  Blood Updated:  08/09/20 0512     Glucose 115 mg/dL      BUN 28 mg/dL      Creatinine 2.14 mg/dL      Sodium 134 mmol/L      Potassium 3.9 mmol/L      Chloride 103 mmol/L      CO2 20.6 mmol/L      Calcium 8.8 mg/dL      eGFR Non African Amer 34 mL/min/1.73      BUN/Creatinine Ratio 13.1     Anion Gap 10.4 mmol/L     Narrative:       GFR Normal >60  Chronic Kidney Disease <60  Kidney Failure <15      Lipid Panel [[id number]]  (Abnormal) Collected:  08/09/20 0358    Specimen:  Blood Updated:  08/09/20 0512     Total Cholesterol 297 mg/dL      Triglycerides 411 mg/dL      HDL Cholesterol 40 mg/dL      LDL Cholesterol  --     Comment: Unable to calculate        VLDL Cholesterol --     Comment: Unable to calculate        LDL/HDL Ratio --     Comment: Unable to calculate       Narrative:       Cholesterol Reference Ranges  (U.S. Department of Health and Human Services ATP III Classifications)    Desirable          <200 mg/dL  Borderline High    200-239 mg/dL  High Risk          >240 mg/dL      Triglyceride Reference Ranges  (U.S. Department of Health and Human Services ATP III Classifications)    Normal           <150 mg/dL  Borderline High  150-199 mg/dL  High             200-499 mg/dL  Very High        >500 mg/dL    HDL Reference Ranges  (U.S. Department of Health and Human Services ATP III Classifcations)    Low     <40 mg/dl (major risk factor for CHD)  High    >60 mg/dl ('negative' risk factor for CHD)        LDL Reference Ranges  (U.S. Department of Health and Human Services ATP III Classifcations)    Optimal          <100 mg/dL  Near  Optimal     100-129 mg/dL  Borderline High  130-159 mg/dL  High             160-189 mg/dL  Very High        >189 mg/dL    CBC (No Diff) [[id number]]  (Abnormal) Collected:  08/09/20 0358    Specimen:  Blood Updated:  08/09/20 0459     WBC 12.28 10*3/mm3      RBC 4.81 10*6/mm3      Hemoglobin 14.5 g/dL      Hematocrit 41.1 %      MCV 85.4 fL      MCH 30.1 pg      MCHC 35.3 g/dL      RDW 13.1 %      RDW-SD 40.5 fl      MPV 10.3 fL      Platelets 290 10*3/mm3     Hemoglobin A1c [[id number]]  (Abnormal) Collected:  08/09/20 0358    Specimen:  Blood Updated:  08/09/20 0453     Hemoglobin A1C 6.00 %     Narrative:       Hemoglobin A1C Ranges:    Increased Risk for Diabetes  5.7% to 6.4%  Diabetes                     >= 6.5%  Diabetic Goal                < 7.0%    POC Glucose Once [[id number]]  (Abnormal) Collected:  08/08/20 1853    Specimen:  Blood Updated:  08/08/20 1854     Glucose 144 mg/dL     COVID PRE-OP / PRE-PROCEDURE SCREENING ORDER (NO ISOLATION) - Swab, Nasopharynx [[id number]] Collected:  08/08/20 1730    Specimen:  Swab from Nasopharynx Updated:  08/08/20 1836    Narrative:       The following orders were created for panel order COVID PRE-OP / PRE-PROCEDURE SCREENING ORDER (NO ISOLATION) - Swab, Nasopharynx.  Procedure                               Abnormality         Status                     ---------                               -----------         ------                     Respiratory Panel PCR w/...[[id number]]  Normal              Final result                 Please view results for these tests on the individual orders.    Respiratory Panel PCR w/COVID-19(SARS-CoV-2) JOANN/AGUSTO/CONOR In-House, NP Swab in [facility], 3-4 Hr TAT - Swab, Nasopharynx [[id number]]  (Normal) Collected:  08/08/20 1730    Specimen:  Swab from Nasopharynx Updated:  08/08/20 1836     ADENOVIRUS, PCR Not Detected     Coronavirus 229E Not Detected     Coronavirus HKU1 Not Detected     Coronavirus NL63 Not Detected     Coronavirus OC43 Not  Detected     COVID19 Not Detected     Human Metapneumovirus Not Detected     Human Rhinovirus/Enterovirus Not Detected     Influenza A PCR Not Detected     Influenza A H1 Not Detected     Influenza A H1 2009 PCR Not Detected     Influenza A H3 Not Detected     Influenza B PCR Not Detected     Parainfluenza Virus 1 Not Detected     Parainfluenza Virus 2 Not Detected     Parainfluenza Virus 3 Not Detected     Parainfluenza Virus 4 Not Detected     RSV, PCR Not Detected     Bordetella pertussis pcr Not Detected     Bordetella parapertussis PCR Not Detected     Chlamydophila pneumoniae PCR Not Detected     Mycoplasma pneumo by PCR Not Detected    Narrative:       Fact sheet for providers: https://docs.[employer]/wp-content/uploads/ZZE6352-5301-HD7.1-EUA-Provider-Fact-Sheet-3.pdf    Fact sheet for patients: https://docs.[employer]/wp-content/uploads/JTW9391-3740-LK5.1-EUA-Patient-Fact-Sheet-1.pdf    Comprehensive Metabolic Panel [[id number]]  (Abnormal) Collected:  08/08/20 1715    Specimen:  Blood Updated:  08/08/20 1748     Glucose 140 mg/dL      BUN 28 mg/dL      Creatinine 2.53 mg/dL      Sodium 138 mmol/L      Potassium 4.3 mmol/L      Chloride 102 mmol/L      CO2 24.7 mmol/L      Calcium 9.1 mg/dL      Total Protein 5.5 g/dL      Albumin 2.60 g/dL      ALT (SGPT) 12 U/L      AST (SGOT) 27 U/L      Alkaline Phosphatase 62 U/L      Total Bilirubin <0.2 mg/dL      eGFR Non African Amer 28 mL/min/1.73      Globulin 2.9 gm/dL      A/G Ratio 0.9 g/dL      BUN/Creatinine Ratio 11.1     Anion Gap 11.3 mmol/L     Narrative:       GFR Normal >60  Chronic Kidney Disease <60  Kidney Failure <15      Troponin [[id number]]  (Normal) Collected:  08/08/20 1715    Specimen:  Blood Updated:  08/08/20 1748     Troponin T <0.010 ng/mL     Narrative:       Troponin T Reference Range:  <= 0.03 ng/mL-   Negative for AMI  >0.03 ng/mL-     Abnormal for myocardial necrosis.  Clinicians would have to utilize clinical acumen, EKG,  Troponin and serial changes to determine if it is an Acute Myocardial Infarction or myocardial injury due to an underlying chronic condition.       Results may be falsely decreased if patient taking Biotin.      Protime-INR [[id number]]  (Normal) Collected:  08/08/20 1715    Specimen:  Blood Updated:  08/08/20 1743     Protime 12.2 Seconds      INR 0.91    aPTT [[id number]]  (Normal) Collected:  08/08/20 1715    Specimen:  Blood Updated:  08/08/20 1743     PTT 26.9 seconds     CBC & Differential [[id number]] Collected:  08/08/20 1715    Specimen:  Blood Updated:  08/08/20 1727    Narrative:       The following orders were created for panel order CBC & Differential.  Procedure                               Abnormality         Status                     ---------                               -----------         ------                     CBC Auto Differential[[id number]]        Abnormal            Final result                 Please view results for these tests on the individual orders.    CBC Auto Differential [[id number]]  (Abnormal) Collected:  08/08/20 1715    Specimen:  Blood Updated:  08/08/20 1727     WBC 9.85 10*3/mm3      RBC 4.77 10*6/mm3      Hemoglobin 14.6 g/dL      Hematocrit 43.0 %      MCV 90.1 fL      MCH 30.6 pg      MCHC 34.0 g/dL      RDW 13.3 %      RDW-SD 44.3 fl      MPV 10.1 fL      Platelets 225 10*3/mm3      Neutrophil % 75.1 %      Lymphocyte % 12.2 %      Monocyte % 7.6 %      Eosinophil % 3.7 %      Basophil % 0.7 %      Immature Grans % 0.7 %      Neutrophils, Absolute 7.40 10*3/mm3      Lymphocytes, Absolute 1.20 10*3/mm3      Monocytes, Absolute 0.75 10*3/mm3      Eosinophils, Absolute 0.36 10*3/mm3      Basophils, Absolute 0.07 10*3/mm3      Immature Grans, Absolute 0.07 10*3/mm3      nRBC 0.0 /100 WBC         Current Radiology  Imaging Results (Last 24 Hours)     Procedure Component Value Units Date/Time    XR Chest 1 View [[id number]] Collected:  08/08/20 1753     Updated:  08/08/20 1757     Narrative:       Portable chest radiograph     HISTORY:Chest pain     TECHNIQUE: Single AP portable radiograph of the chest     COMPARISON:None       Impression:       FINDINGS AND IMPRESSION:  The lungs are hypoinflated. There is pulmonary vascular congestion  without superimposed pulmonary consolidation no pneumothorax or pleural  effusion is seen. Heart size accentuated by low lung volumes. Moderate  to severe left shoulder osteoarthritis is present..     This report was finalized on 8/8/2020 5:54 PM by Dr. Lauro Bojorquez M.D.           Current Meds    Current Facility-Administered Medications:   •  acetaminophen (TYLENOL) tablet 650 mg, 650 mg, Oral, Q4H PRN, Jason Carvalho MD, 650 mg at 08/08/20 2055  •  amLODIPine (NORVASC) tablet 10 mg, 10 mg, Oral, Q24H, Mono Roberts MD, 10 mg at 08/09/20 1403  •  asenapine maleate (SAPHRIS) sublingual tablet 5 mg, 5 mg, Sublingual, Nightly, Mono Roberts MD  •  aspirin EC tablet 325 mg, 325 mg, Oral, Daily, Jason Carvalho MD, 325 mg at 08/09/20 0721  •  Brexpiprazole tablet 3 mg, 3 mg, Oral, Daily, Mono Roberts MD, 3 mg at 08/09/20 0908  •  buPROPion SR (WELLBUTRIN SR) 12 hr tablet 200 mg, 200 mg, Oral, Q12H, Mono Roberts MD  •  clopidogrel (PLAVIX) tablet 75 mg, 75 mg, Oral, Daily, Jason Carvalho MD, 75 mg at 08/09/20 0722  •  DULoxetine (CYMBALTA) DR capsule 120 mg, 120 mg, Oral, Daily, Mono Roberts MD, 120 mg at 08/09/20 0910  •  famotidine (PEPCID) tablet 20 mg, 20 mg, Oral, BID, Mono Roberts MD  •  FLUoxetine (PROzac) capsule 100 mg, 100 mg, Oral, Daily, Mono Roberts MD, 100 mg at 08/09/20 0910  •  lamoTRIgine (LaMICtal) tablet 400 mg, 400 mg, Oral, Daily, Mono Roberts MD, 400 mg at 08/09/20 0910  •  metoprolol tartrate (LOPRESSOR) tablet 25 mg, 25 mg, Oral, Q12H, Mono Roberts MD  •  [COMPLETED] Insert peripheral IV, , , Once **AND** sodium chloride 0.9 % flush 10 mL, 10 mL, Intravenous, PRN, Jason Carvalho MD  •  sodium  chloride 0.9 % with KCl 20 mEq/L infusion, 75 mL/hr, Intravenous, Continuous, Mono Roberts MD, Last Rate: 75 mL/hr at 08/09/20 1537, 75 mL/hr at 08/09/20 1537              Patient Active Problem List   Diagnosis   • Acute MI, inferior wall (CMS/HCC)   armond v/c armond on ckd , will start on I/v fluids , check u/a , lytes and eosinophils and renal u/s, avoid nephrotoxic agents  Heavy proteinuria indicate diabetic nephropathy, will resume ARBS, ONCE SERU CREATININE IMPROVE      Chalino Bentley MD FACP, FASN.  08/09/20  5:13 PM

## 2020-08-09 NOTE — CONSULTS
"Internal medicine consult    Referring physician  Dr. DIA    Chief complaint  Chest pain    Reason for consult  Follow medical problems    History of present illness  40-year white male with history of hypertension and very significant psych history presented to [facility]'s emergency room with right-sided chest discomfort started 15 minutes prior to presentation radiates to right arm but no associated symptoms including shortness of breath nausea vomiting but very diaphoretic.  Patient evaluated in ER found to have acute inferior wall MI taken to Cath Lab and underwent PCI and stent placement and I am asked to follow the patient for medical problem.  Patient has no chest pain at the time of interview.  Patient also denies any fever cough congestion abdominal pain nausea vomiting diarrhea.     PAST MEDICAL HISTORY   Hypertension  Depression  Psychosis      PAST SURGICAL HISTORY  Surgical History   No past surgical history on file.     FAMILY HISTORY  No family history on file.     SOCIAL HISTORY  Social History   Social History            Socioeconomic History   • Marital status: Single       Spouse name: Not on file   • Number of children: Not on file   • Years of education: Not on file   • Highest education level: Not on file   Tobacco Use   • Smoking status: Current Every Day Smoker   Substance and Sexual Activity   • Alcohol use: No   • Drug use: No         ALLERGIES  Patient has no known allergies.  Home medications reviewed     REVIEW OF SYSTEMS  Constitutional: Negative for chills and fever.   Respiratory: Negative for shortness of breath.    Cardiovascular: Positive for chest pain. Negative for palpitations and leg swelling.   Gastrointestinal: Negative for nausea and vomiting.      PHYSICAL EXAM  Blood pressure 136/85, pulse 72, temperature 98.6 °F (37 °C), temperature source Oral, resp. rate 18, height 185.4 cm (72.99\"), weight 126 kg (278 lb 10.6 oz), SpO2 94 %.    Constitutional: He is oriented to " person, place, and time. He appears distressed (moderate).  Head: Atraumatic.   Eyes: EOM are normal.   Neck: Normal range of motion. Neck supple. No JVD present. No tracheal deviation present.   Cardiovascular: Normal rate, regular rhythm, normal heart sounds and intact distal pulses.   Pulmonary/Chest: Effort normal and breath sounds normal. No respiratory distress. He has no wheezes. He has no rales.   Abdominal: Soft. Bowel sounds are normal. He exhibits no distension. There is no tenderness. There is no rebound and no guarding.   Musculoskeletal: Normal range of motion. He exhibits no edema or tenderness.   Lymphadenopathy: He has no cervical adenopathy.   Neurological: He is alert and oriented to person, place, and time.   Skin: Skin is warm. He is diaphoretic. No erythema.     LAB RESULTS  Lab Results (last 24 hours)     Procedure Component Value Units Date/Time    POC Glucose Once [[id number]]  (Abnormal) Collected:  08/09/20 1114    Specimen:  Blood Updated:  08/09/20 1118     Glucose 153 mg/dL     POC Glucose Once [[id number]]  (Normal) Collected:  08/09/20 0716    Specimen:  Blood Updated:  08/09/20 0717     Glucose 129 mg/dL     LDL Cholesterol, Direct [[id number]]  (Abnormal) Collected:  08/09/20 0358    Specimen:  Blood Updated:  08/09/20 0616     LDL Cholesterol  222 mg/dL     Narrative:       LDL Reference Ranges    (U.S. Department of Health and Human Services ATP III Classifications)    Optimal          <100 mg/dl  Near Optimal     100-129 mg/dl  Borderline High  130-159 mg/dl  High             160-189 mg/dl  Very High        >189 mg/dl      Basic Metabolic Panel [[id number]]  (Abnormal) Collected:  08/09/20 0358    Specimen:  Blood Updated:  08/09/20 0512     Glucose 115 mg/dL      BUN 28 mg/dL      Creatinine 2.14 mg/dL      Sodium 134 mmol/L      Potassium 3.9 mmol/L      Chloride 103 mmol/L      CO2 20.6 mmol/L      Calcium 8.8 mg/dL      eGFR Non African Amer 34 mL/min/1.73      BUN/Creatinine  Ratio 13.1     Anion Gap 10.4 mmol/L     Narrative:       GFR Normal >60  Chronic Kidney Disease <60  Kidney Failure <15      Lipid Panel [[id number]]  (Abnormal) Collected:  08/09/20 0358    Specimen:  Blood Updated:  08/09/20 0512     Total Cholesterol 297 mg/dL      Triglycerides 411 mg/dL      HDL Cholesterol 40 mg/dL      LDL Cholesterol  --     Comment: Unable to calculate        VLDL Cholesterol --     Comment: Unable to calculate        LDL/HDL Ratio --     Comment: Unable to calculate       Narrative:       Cholesterol Reference Ranges  (U.S. Department of Health and Human Services ATP III Classifications)    Desirable          <200 mg/dL  Borderline High    200-239 mg/dL  High Risk          >240 mg/dL      Triglyceride Reference Ranges  (U.S. Department of Health and Human Services ATP III Classifications)    Normal           <150 mg/dL  Borderline High  150-199 mg/dL  High             200-499 mg/dL  Very High        >500 mg/dL    HDL Reference Ranges  (U.S. Department of Health and Human Services ATP III Classifcations)    Low     <40 mg/dl (major risk factor for CHD)  High    >60 mg/dl ('negative' risk factor for CHD)        LDL Reference Ranges  (U.S. Department of Health and Human Services ATP III Classifcations)    Optimal          <100 mg/dL  Near Optimal     100-129 mg/dL  Borderline High  130-159 mg/dL  High             160-189 mg/dL  Very High        >189 mg/dL    CBC (No Diff) [[id number]]  (Abnormal) Collected:  08/09/20 0358    Specimen:  Blood Updated:  08/09/20 0459     WBC 12.28 10*3/mm3      RBC 4.81 10*6/mm3      Hemoglobin 14.5 g/dL      Hematocrit 41.1 %      MCV 85.4 fL      MCH 30.1 pg      MCHC 35.3 g/dL      RDW 13.1 %      RDW-SD 40.5 fl      MPV 10.3 fL      Platelets 290 10*3/mm3     Hemoglobin A1c [[id number]]  (Abnormal) Collected:  08/09/20 0358    Specimen:  Blood Updated:  08/09/20 0453     Hemoglobin A1C 6.00 %     Narrative:       Hemoglobin A1C Ranges:    Increased Risk for  Diabetes  5.7% to 6.4%  Diabetes                     >= 6.5%  Diabetic Goal                < 7.0%    POC Glucose Once [[id number]]  (Abnormal) Collected:  08/08/20 1853    Specimen:  Blood Updated:  08/08/20 1854     Glucose 144 mg/dL     COVID PRE-OP / PRE-PROCEDURE SCREENING ORDER (NO ISOLATION) - Swab, Nasopharynx [[id number]] Collected:  08/08/20 1730    Specimen:  Swab from Nasopharynx Updated:  08/08/20 1836    Narrative:       The following orders were created for panel order COVID PRE-OP / PRE-PROCEDURE SCREENING ORDER (NO ISOLATION) - Swab, Nasopharynx.  Procedure                               Abnormality         Status                     ---------                               -----------         ------                     Respiratory Panel PCR w/...[[id number]]  Normal              Final result                 Please view results for these tests on the individual orders.    Respiratory Panel PCR w/COVID-19(SARS-CoV-2) JOANN/AGUSTO/CONOR In-House, NP Swab in [facility], 3-4 Hr TAT - Swab, Nasopharynx [[id number]]  (Normal) Collected:  08/08/20 1730    Specimen:  Swab from Nasopharynx Updated:  08/08/20 1836     ADENOVIRUS, PCR Not Detected     Coronavirus 229E Not Detected     Coronavirus HKU1 Not Detected     Coronavirus NL63 Not Detected     Coronavirus OC43 Not Detected     COVID19 Not Detected     Human Metapneumovirus Not Detected     Human Rhinovirus/Enterovirus Not Detected     Influenza A PCR Not Detected     Influenza A H1 Not Detected     Influenza A H1 2009 PCR Not Detected     Influenza A H3 Not Detected     Influenza B PCR Not Detected     Parainfluenza Virus 1 Not Detected     Parainfluenza Virus 2 Not Detected     Parainfluenza Virus 3 Not Detected     Parainfluenza Virus 4 Not Detected     RSV, PCR Not Detected     Bordetella pertussis pcr Not Detected     Bordetella parapertussis PCR Not Detected     Chlamydophila pneumoniae PCR Not Detected     Mycoplasma pneumo by PCR Not Detected    Narrative:        Fact sheet for providers: https://docs.[employer]/wp-content/uploads/XCV7825-8867-SP3.1-EUA-Provider-Fact-Sheet-3.pdf    Fact sheet for patients: https://docs.[employer]/wp-content/uploads/SUU4051-4075-DE5.1-EUA-Patient-Fact-Sheet-1.pdf    Comprehensive Metabolic Panel [[id number]]  (Abnormal) Collected:  08/08/20 1715    Specimen:  Blood Updated:  08/08/20 1748     Glucose 140 mg/dL      BUN 28 mg/dL      Creatinine 2.53 mg/dL      Sodium 138 mmol/L      Potassium 4.3 mmol/L      Chloride 102 mmol/L      CO2 24.7 mmol/L      Calcium 9.1 mg/dL      Total Protein 5.5 g/dL      Albumin 2.60 g/dL      ALT (SGPT) 12 U/L      AST (SGOT) 27 U/L      Alkaline Phosphatase 62 U/L      Total Bilirubin <0.2 mg/dL      eGFR Non African Amer 28 mL/min/1.73      Globulin 2.9 gm/dL      A/G Ratio 0.9 g/dL      BUN/Creatinine Ratio 11.1     Anion Gap 11.3 mmol/L     Narrative:       GFR Normal >60  Chronic Kidney Disease <60  Kidney Failure <15      Troponin [[id number]]  (Normal) Collected:  08/08/20 1715    Specimen:  Blood Updated:  08/08/20 1748     Troponin T <0.010 ng/mL     Narrative:       Troponin T Reference Range:  <= 0.03 ng/mL-   Negative for AMI  >0.03 ng/mL-     Abnormal for myocardial necrosis.  Clinicians would have to utilize clinical acumen, EKG, Troponin and serial changes to determine if it is an Acute Myocardial Infarction or myocardial injury due to an underlying chronic condition.       Results may be falsely decreased if patient taking Biotin.      Protime-INR [[id number]]  (Normal) Collected:  08/08/20 1715    Specimen:  Blood Updated:  08/08/20 1743     Protime 12.2 Seconds      INR 0.91    aPTT [[id number]]  (Normal) Collected:  08/08/20 1715    Specimen:  Blood Updated:  08/08/20 1743     PTT 26.9 seconds     CBC & Differential [[id number]] Collected:  08/08/20 1715    Specimen:  Blood Updated:  08/08/20 2510    Narrative:       The following orders were created for panel order CBC &  Differential.  Procedure                               Abnormality         Status                     ---------                               -----------         ------                     CBC Auto Differential[[id number]]        Abnormal            Final result                 Please view results for these tests on the individual orders.    CBC Auto Differential [[id number]]  (Abnormal) Collected:  08/08/20 1715    Specimen:  Blood Updated:  08/08/20 1727     WBC 9.85 10*3/mm3      RBC 4.77 10*6/mm3      Hemoglobin 14.6 g/dL      Hematocrit 43.0 %      MCV 90.1 fL      MCH 30.6 pg      MCHC 34.0 g/dL      RDW 13.3 %      RDW-SD 44.3 fl      MPV 10.1 fL      Platelets 225 10*3/mm3      Neutrophil % 75.1 %      Lymphocyte % 12.2 %      Monocyte % 7.6 %      Eosinophil % 3.7 %      Basophil % 0.7 %      Immature Grans % 0.7 %      Neutrophils, Absolute 7.40 10*3/mm3      Lymphocytes, Absolute 1.20 10*3/mm3      Monocytes, Absolute 0.75 10*3/mm3      Eosinophils, Absolute 0.36 10*3/mm3      Basophils, Absolute 0.07 10*3/mm3      Immature Grans, Absolute 0.07 10*3/mm3      nRBC 0.0 /100 WBC         Imaging Results (Last 24 Hours)     Procedure Component Value Units Date/Time    XR Chest 1 View [[id number]] Collected:  08/08/20 1753     Updated:  08/08/20 1757    Narrative:       Portable chest radiograph     HISTORY:Chest pain     TECHNIQUE: Single AP portable radiograph of the chest     COMPARISON:None       Impression:       FINDINGS AND IMPRESSION:  The lungs are hypoinflated. There is pulmonary vascular congestion  without superimposed pulmonary consolidation no pneumothorax or pleural  effusion is seen. Heart size accentuated by low lung volumes. Moderate  to severe left shoulder osteoarthritis is present..     This report was finalized on 8/8/2020 5:54 PM by Dr. Lauro Bojorquez M.D.           ECG 12 Lead        HEART RATE= 81  bpm  RR Interval= 740  ms  FL Interval= 177  ms  P Horizontal Axis= -30  deg  P Front Axis=  -7  deg  QRSD Interval= 104  ms  QT Interval= 378  ms  QRS Axis= 38  deg  T Wave Axis= 101  deg  - ABNORMAL ECG -  Sinus rhythm  Inferior infarct, acute             Current Facility-Administered Medications:   •  acetaminophen (TYLENOL) tablet 650 mg, 650 mg, Oral, Q4H PRN, Jason Carvalho MD, 650 mg at 08/08/20 2055  •  amLODIPine (NORVASC) tablet 10 mg, 10 mg, Oral, Q24H, Mono Roberts MD  •  asenapine maleate (SAPHRIS) sublingual tablet 5 mg, 5 mg, Sublingual, Nightly, Mono Roberts MD  •  aspirin EC tablet 325 mg, 325 mg, Oral, Daily, Jason Carvalho MD, 325 mg at 08/09/20 0721  •  Brexpiprazole tablet 3 mg, 3 mg, Oral, Daily, Mono Roberts MD, 3 mg at 08/09/20 0908  •  buPROPion SR (WELLBUTRIN SR) 12 hr tablet 200 mg, 200 mg, Oral, Q12H, Mono Roberts MD  •  clopidogrel (PLAVIX) tablet 75 mg, 75 mg, Oral, Daily, Jason Carvalho MD, 75 mg at 08/09/20 0722  •  DULoxetine (CYMBALTA) DR capsule 120 mg, 120 mg, Oral, Daily, Mono Roberts MD, 120 mg at 08/09/20 0910  •  FLUoxetine (PROzac) capsule 100 mg, 100 mg, Oral, Daily, Mono Roberts MD, 100 mg at 08/09/20 0910  •  lamoTRIgine (LaMICtal) tablet 400 mg, 400 mg, Oral, Daily, Mono Roberts MD, 400 mg at 08/09/20 0910  •  losartan (COZAAR) tablet 100 mg, 100 mg, Oral, Daily, Jason Carvalho MD, 100 mg at 08/09/20 0721  •  metoprolol tartrate (LOPRESSOR) tablet 25 mg, 25 mg, Oral, Q12H, Mono Roberts MD  •  [COMPLETED] Insert peripheral IV, , , Once **AND** sodium chloride 0.9 % flush 10 mL, 10 mL, Intravenous, PRN, Jason Carvalho MD     ASSESSMENT  Acute inferior wall MI status post PCI and stent placement  Coronary artery disease  Hypertension  Hyperlipidemia  Depression  Significant psych history  Chronic kidney disease stage III  Gastroesophageal reflux disease    PLAN  Agree with current care  Aspirin Plavix Lopressor  IV fluids  Resume home medications  Stress ulcer DVT prophylaxis  Supportive care  Repeat labs in  a.m.  Patient is full code  Discussed with nursing staff  Follow closely further recommendation current hospital course    TOO PETERSON MD

## 2020-08-09 NOTE — PROGRESS NOTES
[facility]  Cardiology Progress Note    Patient Identification:  Name: Jai Gayle  Age: 40 y.o.  Sex: male  :  1980  MRN: [id number]                 Follow Up / Chief Complaint: Chest pain    Interval History:  Patient admitted with acute myocardial infarction underwent angioplasty and stent without any problems and complications     Subjective:  No chest pains and tightness in the chest    Objective:    Past Medical History:  No past medical history on file.  Past Surgical History:  No past surgical history on file.     Social History:   Social History     Tobacco Use   • Smoking status: Current Every Day Smoker     Packs/day: 1.50     Types: Cigarettes   • Smokeless tobacco: Never Used   Substance Use Topics   • Alcohol use: No      Family History:  No family history on file.       Allergies:  No Known Allergies  Scheduled Meds:    amLODIPine 10 mg Q24H   asenapine maleate 5 mg Nightly   aspirin  mg Daily   Brexpiprazole 3 mg Daily   buPROPion  mg Q12H   clopidogrel 75 mg Daily   DULoxetine 120 mg Daily   famotidine 20 mg BID   FLUoxetine 100 mg Daily   lamoTRIgine 400 mg Daily   metoprolol tartrate 25 mg Q12H           INTAKE AND OUTPUT:    Intake/Output Summary (Last 24 hours) at 2020 1447  Last data filed at 2020 1000  Gross per 24 hour   Intake 841 ml   Output 1600 ml   Net -759 ml       Review of Systems:   GI:  Cardiac: No chest pain  Pulmonary:    Constitutional:  Temp:  [96.2 °F (35.7 °C)-98.7 °F (37.1 °C)] 98.6 °F (37 °C)  Heart Rate:  [68-92] 79  Resp:  [14-18] 18  BP: (130-225)/() 141/92    Physical Exam:  General:  Appears in no acute distress  Eyes: PERTL,  HEENT:  No JVD. Thyroid not visibly enlarged. No mucosal pallor or cyanosis  Respiratory: Respirations regular and unlabored at rest. BBS with good air entry in all fields. No crackles, rubs or wheezes auscultated  Cardiovascular: S1S2 Regular rate and rhythm. No murmur, rub or gallop. No  "carotid bruits. DP/PT pulses     . No pretibial pitting edema  Gastrointestinal: Abdomen soft, flat, non tender. Bowel sounds present. No hepatosplenomegaly. No ascites  Musculoskeletal: ATKINSON x4. No abnormal movements  Extremities: No digital clubbing or cyanosis  Skin: Color pink. Skin warm and dry to touch. No rashes    Neuro: AAO x3 CN II-XII grossly intact  Psych: Mood and affect normal, pleasant and cooperative          Cardiographics  Telemetry:     ECG:     Echocardiogram:     Lab Review   Results from last 7 days   Lab Units 08/08/20  1715   TROPONIN T ng/mL <0.010         Results from last 7 days   Lab Units 08/09/20  0358   SODIUM mmol/L 134*   POTASSIUM mmol/L 3.9   BUN mg/dL 28*   CREATININE mg/dL 2.14*   CALCIUM mg/dL 8.8     @[id number]@  Results from last 7 days   Lab Units 08/09/20  0358 08/08/20  1715   WBC 10*3/mm3 12.28* 9.85   HEMOGLOBIN g/dL 14.5 14.6   HEMATOCRIT % 41.1 43.0   PLATELETS 10*3/mm3 290 225     Results from last 7 days   Lab Units 08/08/20  1715   INR  0.91   APTT seconds 26.9         Assessment:  Status post acute myocardial infarction  Status post angioplasty stent  Hypertension  Obesity      Plan:    Post angioplasty stent patient appears to be stable patient will be discharged in the next couple days at this point will be transferred to the floor      )8/9/2020  MD ARTEM Gomes/Transcription:   \"Dictated utilizing Dragon dictation\".     "

## 2020-08-10 ENCOUNTER — TRANSCRIBE ORDERS (OUTPATIENT)
Dept: SLEEP MEDICINE | Facility: HOSPITAL | Age: 40
End: 2020-08-10

## 2020-08-10 PROBLEM — I25.10 CAD (CORONARY ARTERY DISEASE): Status: ACTIVE | Noted: 2020-08-10

## 2020-08-10 LAB
ALBUMIN SERPL-MCNC: 2.5 G/DL (ref 3.5–5.2)
ALBUMIN/GLOB SERPL: 0.9 G/DL
ALP SERPL-CCNC: 53 U/L (ref 39–117)
ALT SERPL W P-5'-P-CCNC: 11 U/L (ref 1–41)
ANION GAP SERPL CALCULATED.3IONS-SCNC: 7.6 MMOL/L (ref 5–15)
AORTIC DIMENSIONLESS INDEX: 0.8 (DI)
AST SERPL-CCNC: 31 U/L (ref 1–40)
BASOPHILS # BLD AUTO: 0.05 10*3/MM3 (ref 0–0.2)
BASOPHILS NFR BLD AUTO: 0.6 % (ref 0–1.5)
BH CV ECHO MEAS - AO MAX PG (FULL): 1.8 MMHG
BH CV ECHO MEAS - AO MAX PG: 6.9 MMHG
BH CV ECHO MEAS - AO MEAN PG (FULL): 1 MMHG
BH CV ECHO MEAS - AO MEAN PG: 3 MMHG
BH CV ECHO MEAS - AO ROOT AREA (BSA CORRECTED): 1.3
BH CV ECHO MEAS - AO ROOT AREA: 8.6 CM^2
BH CV ECHO MEAS - AO ROOT DIAM: 3.3 CM
BH CV ECHO MEAS - AO V2 MAX: 131 CM/SEC
BH CV ECHO MEAS - AO V2 MEAN: 83.4 CM/SEC
BH CV ECHO MEAS - AO V2 VTI: 23.9 CM
BH CV ECHO MEAS - ASC AORTA: 3.5 CM
BH CV ECHO MEAS - AVA(I,A): 3.9 CM^2
BH CV ECHO MEAS - AVA(I,D): 3.9 CM^2
BH CV ECHO MEAS - AVA(V,A): 4.2 CM^2
BH CV ECHO MEAS - AVA(V,D): 4.2 CM^2
BH CV ECHO MEAS - BSA(HAYCOCK): 2.6 M^2
BH CV ECHO MEAS - BSA: 2.5 M^2
BH CV ECHO MEAS - BZI_BMI: 36.8 KILOGRAMS/M^2
BH CV ECHO MEAS - BZI_METRIC_HEIGHT: 185 CM
BH CV ECHO MEAS - BZI_METRIC_WEIGHT: 126 KG
BH CV ECHO MEAS - EDV(CUBED): 103.8 ML
BH CV ECHO MEAS - EDV(MOD-SP2): 70 ML
BH CV ECHO MEAS - EDV(MOD-SP4): 103 ML
BH CV ECHO MEAS - EDV(TEICH): 102.4 ML
BH CV ECHO MEAS - EF(CUBED): 42.9 %
BH CV ECHO MEAS - EF(MOD-BP): 46.7 %
BH CV ECHO MEAS - EF(MOD-SP2): 51.4 %
BH CV ECHO MEAS - EF(MOD-SP4): 44.7 %
BH CV ECHO MEAS - EF(TEICH): 35.6 %
BH CV ECHO MEAS - ESV(CUBED): 59.3 ML
BH CV ECHO MEAS - ESV(MOD-SP2): 34 ML
BH CV ECHO MEAS - ESV(MOD-SP4): 57 ML
BH CV ECHO MEAS - ESV(TEICH): 65.9 ML
BH CV ECHO MEAS - FS: 17 %
BH CV ECHO MEAS - IVS/LVPW: 1
BH CV ECHO MEAS - IVSD: 1.6 CM
BH CV ECHO MEAS - LAT PEAK E' VEL: 12.6 CM/SEC
BH CV ECHO MEAS - LV DIASTOLIC VOL/BSA (35-75): 41.7 ML/M^2
BH CV ECHO MEAS - LV MASS(C)D: 324.4 GRAMS
BH CV ECHO MEAS - LV MASS(C)DI: 131.3 GRAMS/M^2
BH CV ECHO MEAS - LV MAX PG: 5 MMHG
BH CV ECHO MEAS - LV MEAN PG: 2 MMHG
BH CV ECHO MEAS - LV SYSTOLIC VOL/BSA (12-30): 23.1 ML/M^2
BH CV ECHO MEAS - LV V1 MAX: 112 CM/SEC
BH CV ECHO MEAS - LV V1 MEAN: 70.8 CM/SEC
BH CV ECHO MEAS - LV V1 VTI: 19 CM
BH CV ECHO MEAS - LVIDD: 4.7 CM
BH CV ECHO MEAS - LVIDS: 3.9 CM
BH CV ECHO MEAS - LVLD AP2: 8.7 CM
BH CV ECHO MEAS - LVLD AP4: 8.9 CM
BH CV ECHO MEAS - LVLS AP2: 7.8 CM
BH CV ECHO MEAS - LVLS AP4: 8.6 CM
BH CV ECHO MEAS - LVOT AREA (M): 4.9 CM^2
BH CV ECHO MEAS - LVOT AREA: 4.9 CM^2
BH CV ECHO MEAS - LVOT DIAM: 2.5 CM
BH CV ECHO MEAS - LVPWD: 1.6 CM
BH CV ECHO MEAS - MED PEAK E' VEL: 13.6 CM/SEC
BH CV ECHO MEAS - MV A DUR: 121 SEC
BH CV ECHO MEAS - MV A MAX VEL: 84.4 CM/SEC
BH CV ECHO MEAS - MV DEC SLOPE: 421 CM/SEC^2
BH CV ECHO MEAS - MV DEC TIME: 156 SEC
BH CV ECHO MEAS - MV E MAX VEL: 68.1 CM/SEC
BH CV ECHO MEAS - MV E/A: 0.81
BH CV ECHO MEAS - MV MAX PG: 3.2 MMHG
BH CV ECHO MEAS - MV MEAN PG: 1 MMHG
BH CV ECHO MEAS - MV P1/2T MAX VEL: 91.2 CM/SEC
BH CV ECHO MEAS - MV P1/2T: 63.4 MSEC
BH CV ECHO MEAS - MV V2 MAX: 89.4 CM/SEC
BH CV ECHO MEAS - MV V2 MEAN: 52.3 CM/SEC
BH CV ECHO MEAS - MV V2 VTI: 25.8 CM
BH CV ECHO MEAS - MVA P1/2T LCG: 2.4 CM^2
BH CV ECHO MEAS - MVA(P1/2T): 3.5 CM^2
BH CV ECHO MEAS - MVA(VTI): 3.6 CM^2
BH CV ECHO MEAS - PA ACC TIME: 0.13 SEC
BH CV ECHO MEAS - PA MAX PG (FULL): 3.3 MMHG
BH CV ECHO MEAS - PA MAX PG: 5.8 MMHG
BH CV ECHO MEAS - PA PR(ACCEL): 21.9 MMHG
BH CV ECHO MEAS - PA V2 MAX: 120 CM/SEC
BH CV ECHO MEAS - PULM A REVS DUR: 0.12 SEC
BH CV ECHO MEAS - PULM A REVS VEL: 17 CM/SEC
BH CV ECHO MEAS - PULM DIAS VEL: 37 CM/SEC
BH CV ECHO MEAS - PULM S/D: 1.8
BH CV ECHO MEAS - PULM SYS VEL: 66.2 CM/SEC
BH CV ECHO MEAS - PVA(V,A): 2.3 CM^2
BH CV ECHO MEAS - PVA(V,D): 2.3 CM^2
BH CV ECHO MEAS - QP/QS: 0.51
BH CV ECHO MEAS - RAP SYSTOLE: 8 MMHG
BH CV ECHO MEAS - RV MAX PG: 2.5 MMHG
BH CV ECHO MEAS - RV MEAN PG: 1 MMHG
BH CV ECHO MEAS - RV V1 MAX: 79 CM/SEC
BH CV ECHO MEAS - RV V1 MEAN: 48.5 CM/SEC
BH CV ECHO MEAS - RV V1 VTI: 13.7 CM
BH CV ECHO MEAS - RVOT AREA: 3.5 CM^2
BH CV ECHO MEAS - RVOT DIAM: 2.1 CM
BH CV ECHO MEAS - RVSP: 32 MMHG
BH CV ECHO MEAS - SI(AO): 82.8 ML/M^2
BH CV ECHO MEAS - SI(CUBED): 18 ML/M^2
BH CV ECHO MEAS - SI(LVOT): 37.8 ML/M^2
BH CV ECHO MEAS - SI(MOD-SP2): 14.6 ML/M^2
BH CV ECHO MEAS - SI(MOD-SP4): 18.6 ML/M^2
BH CV ECHO MEAS - SI(TEICH): 14.8 ML/M^2
BH CV ECHO MEAS - SV(AO): 204.4 ML
BH CV ECHO MEAS - SV(CUBED): 44.5 ML
BH CV ECHO MEAS - SV(LVOT): 93.3 ML
BH CV ECHO MEAS - SV(MOD-SP2): 36 ML
BH CV ECHO MEAS - SV(MOD-SP4): 46 ML
BH CV ECHO MEAS - SV(RVOT): 47.5 ML
BH CV ECHO MEAS - SV(TEICH): 36.5 ML
BH CV ECHO MEAS - TAPSE (>1.6): 2.9 CM2
BH CV ECHO MEAS - TR MAX PG: 24 MMHG
BH CV ECHO MEAS - TR MAX VEL: 247 CM/SEC
BH CV ECHO MEASUREMENTS AVERAGE E/E' RATIO: 5.2
BH CV XLRA - RV BASE: 3.9 CM
BH CV XLRA - RV LENGTH: 7.4 CM
BH CV XLRA - TDI S': 16.6 CM/SEC
BILIRUB SERPL-MCNC: 0.2 MG/DL (ref 0–1.2)
BUN SERPL-MCNC: 26 MG/DL (ref 6–20)
BUN/CREAT SERPL: 12.7 (ref 7–25)
CALCIUM SPEC-SCNC: 8.3 MG/DL (ref 8.6–10.5)
CHLORIDE SERPL-SCNC: 106 MMOL/L (ref 98–107)
CHOLEST SERPL-MCNC: 284 MG/DL (ref 0–200)
CO2 SERPL-SCNC: 23.4 MMOL/L (ref 22–29)
CREAT SERPL-MCNC: 2.04 MG/DL (ref 0.76–1.27)
DEPRECATED RDW RBC AUTO: 40.2 FL (ref 37–54)
EOSINOPHIL # BLD AUTO: 0.35 10*3/MM3 (ref 0–0.4)
EOSINOPHIL NFR BLD AUTO: 3.9 % (ref 0.3–6.2)
ERYTHROCYTE [DISTWIDTH] IN BLOOD BY AUTOMATED COUNT: 12.9 % (ref 12.3–15.4)
GFR SERPL CREATININE-BSD FRML MDRD: 36 ML/MIN/1.73
GLOBULIN UR ELPH-MCNC: 2.9 GM/DL
GLUCOSE SERPL-MCNC: 97 MG/DL (ref 65–99)
HCT VFR BLD AUTO: 39.3 % (ref 37.5–51)
HDLC SERPL-MCNC: 36 MG/DL (ref 40–60)
HGB BLD-MCNC: 13.7 G/DL (ref 13–17.7)
IMM GRANULOCYTES # BLD AUTO: 0.05 10*3/MM3 (ref 0–0.05)
IMM GRANULOCYTES NFR BLD AUTO: 0.6 % (ref 0–0.5)
LDLC SERPL CALC-MCNC: 177 MG/DL (ref 0–100)
LDLC/HDLC SERPL: 4.93 {RATIO}
LYMPHOCYTES # BLD AUTO: 1.22 10*3/MM3 (ref 0.7–3.1)
LYMPHOCYTES NFR BLD AUTO: 13.6 % (ref 19.6–45.3)
MAXIMAL PREDICTED HEART RATE: 179 BPM
MCH RBC QN AUTO: 30 PG (ref 26.6–33)
MCHC RBC AUTO-ENTMCNC: 34.9 G/DL (ref 31.5–35.7)
MCV RBC AUTO: 86.2 FL (ref 79–97)
MONOCYTES # BLD AUTO: 0.72 10*3/MM3 (ref 0.1–0.9)
MONOCYTES NFR BLD AUTO: 8 % (ref 5–12)
NEUTROPHILS NFR BLD AUTO: 6.58 10*3/MM3 (ref 1.7–7)
NEUTROPHILS NFR BLD AUTO: 73.3 % (ref 42.7–76)
NRBC BLD AUTO-RTO: 0 /100 WBC (ref 0–0.2)
NT-PROBNP SERPL-MCNC: 2871 PG/ML (ref 0–450)
PLATELET # BLD AUTO: 192 10*3/MM3 (ref 140–450)
PMV BLD AUTO: 10.2 FL (ref 6–12)
POTASSIUM SERPL-SCNC: 3.7 MMOL/L (ref 3.5–5.2)
PROT SERPL-MCNC: 5.4 G/DL (ref 6–8.5)
RBC # BLD AUTO: 4.56 10*6/MM3 (ref 4.14–5.8)
SODIUM SERPL-SCNC: 137 MMOL/L (ref 136–145)
STRESS TARGET HR: 152 BPM
TRIGL SERPL-MCNC: 353 MG/DL (ref 0–150)
TSH SERPL DL<=0.05 MIU/L-ACNC: 2.87 UIU/ML (ref 0.27–4.2)
VLDLC SERPL-MCNC: 70.6 MG/DL (ref 5–40)
WBC # BLD AUTO: 8.97 10*3/MM3 (ref 3.4–10.8)

## 2020-08-10 PROCEDURE — 83880 ASSAY OF NATRIURETIC PEPTIDE: CPT | Performed by: HOSPITALIST

## 2020-08-10 PROCEDURE — 84443 ASSAY THYROID STIM HORMONE: CPT | Performed by: HOSPITALIST

## 2020-08-10 PROCEDURE — 99232 SBSQ HOSP IP/OBS MODERATE 35: CPT | Performed by: INTERNAL MEDICINE

## 2020-08-10 PROCEDURE — 80061 LIPID PANEL: CPT | Performed by: HOSPITALIST

## 2020-08-10 PROCEDURE — 80053 COMPREHEN METABOLIC PANEL: CPT | Performed by: HOSPITALIST

## 2020-08-10 PROCEDURE — 85025 COMPLETE CBC W/AUTO DIFF WBC: CPT | Performed by: HOSPITALIST

## 2020-08-10 RX ORDER — SODIUM CHLORIDE 9 MG/ML
50 INJECTION, SOLUTION INTRAVENOUS CONTINUOUS
Status: DISCONTINUED | OUTPATIENT
Start: 2020-08-10 | End: 2020-08-11

## 2020-08-10 RX ORDER — HYDRALAZINE HYDROCHLORIDE 20 MG/ML
10 INJECTION INTRAMUSCULAR; INTRAVENOUS EVERY 4 HOURS PRN
Status: DISCONTINUED | OUTPATIENT
Start: 2020-08-10 | End: 2020-08-11 | Stop reason: HOSPADM

## 2020-08-10 RX ORDER — HYDRALAZINE HYDROCHLORIDE 25 MG/1
25 TABLET, FILM COATED ORAL EVERY 8 HOURS SCHEDULED
Status: DISCONTINUED | OUTPATIENT
Start: 2020-08-10 | End: 2020-08-11 | Stop reason: HOSPADM

## 2020-08-10 RX ORDER — ATORVASTATIN CALCIUM 80 MG/1
80 TABLET, FILM COATED ORAL NIGHTLY
Status: DISCONTINUED | OUTPATIENT
Start: 2020-08-10 | End: 2020-08-11 | Stop reason: HOSPADM

## 2020-08-10 RX ORDER — ATORVASTATIN CALCIUM 20 MG/1
40 TABLET, FILM COATED ORAL NIGHTLY
Status: DISCONTINUED | OUTPATIENT
Start: 2020-08-10 | End: 2020-08-10

## 2020-08-10 RX ADMIN — SODIUM CHLORIDE 50 ML/HR: 9 INJECTION, SOLUTION INTRAVENOUS at 14:02

## 2020-08-10 RX ADMIN — METOPROLOL TARTRATE 25 MG: 25 TABLET, FILM COATED ORAL at 07:46

## 2020-08-10 RX ADMIN — METOPROLOL TARTRATE 25 MG: 25 TABLET, FILM COATED ORAL at 21:22

## 2020-08-10 RX ADMIN — BUPROPION HYDROCHLORIDE 200 MG: 200 TABLET, EXTENDED RELEASE ORAL at 21:22

## 2020-08-10 RX ADMIN — BUPROPION HYDROCHLORIDE 200 MG: 200 TABLET, EXTENDED RELEASE ORAL at 07:47

## 2020-08-10 RX ADMIN — AMLODIPINE BESYLATE 10 MG: 10 TABLET ORAL at 07:46

## 2020-08-10 RX ADMIN — FAMOTIDINE 20 MG: 20 TABLET, FILM COATED ORAL at 07:51

## 2020-08-10 RX ADMIN — SODIUM BICARBONATE: 84 INJECTION, SOLUTION INTRAVENOUS at 08:37

## 2020-08-10 RX ADMIN — ATORVASTATIN CALCIUM 80 MG: 80 TABLET, FILM COATED ORAL at 21:22

## 2020-08-10 RX ADMIN — LAMOTRIGINE 400 MG: 200 TABLET ORAL at 07:47

## 2020-08-10 RX ADMIN — FAMOTIDINE 20 MG: 20 TABLET, FILM COATED ORAL at 21:22

## 2020-08-10 RX ADMIN — DULOXETINE HYDROCHLORIDE 120 MG: 60 CAPSULE, DELAYED RELEASE ORAL at 07:47

## 2020-08-10 RX ADMIN — ASPIRIN 325 MG: 325 TABLET, COATED ORAL at 07:47

## 2020-08-10 RX ADMIN — HYDRALAZINE HYDROCHLORIDE 25 MG: 25 TABLET, FILM COATED ORAL at 21:22

## 2020-08-10 RX ADMIN — CLOPIDOGREL 75 MG: 75 TABLET, FILM COATED ORAL at 07:47

## 2020-08-10 RX ADMIN — ASENAPINE MALEATE 5 MG: 5 TABLET SUBLINGUAL at 21:22

## 2020-08-10 RX ADMIN — BREXPIPRAZOLE 3 MG: 3 TABLET ORAL at 07:47

## 2020-08-10 RX ADMIN — HYDRALAZINE HYDROCHLORIDE 25 MG: 25 TABLET, FILM COATED ORAL at 14:03

## 2020-08-10 RX ADMIN — FLUOXETINE HYDROCHLORIDE 100 MG: 20 CAPSULE ORAL at 07:47

## 2020-08-10 NOTE — CONSULTS
Met with patient, discussed benefits of cardiac rehab. Provided phase II information along with the contact information for cardiac rehab here at [facility]. Patient states he is excited about attending the program. He reports he is not sure how long he will remain in the hospital due to blood pressure being elevated. Reviewed patients cardiovascular risk factors with patient. Discussed smoking cessation. Will call and schedule appointment with patient after discharge.

## 2020-08-10 NOTE — PLAN OF CARE
VSS. Pt s/p MI, PCI on 8/8. Rt radial site is C/D/I with no issue. IVF's continue at 100cc/hr per renal. WCTM closely.

## 2020-08-10 NOTE — PROGRESS NOTES
LOS: 2 days     Chief Complaint/ Reason for encounter: Acute on chronic renal failure  Chief Complaint   Patient presents with   • Chest Pain         Subjective   Patient is doing well today with no new complaints  Good appetite with no nausea or vomiting  No shortness of breath chest pain or edema  Voiding well with no dysuria  Status post PTCI        Medical history reviewed:  History of Present Illness    Subjective    History taken from: Patient and chart    Vital Signs  Temp:  [96.5 °F (35.8 °C)-98.8 °F (37.1 °C)] 96.5 °F (35.8 °C)  Heart Rate:  [66-80] 68  Resp:  [18] 18  BP: (139-164)/() 158/105       Wt Readings from Last 1 Encounters:   08/09/20 1504 126 kg (277 lb 12.5 oz)   08/08/20 1850 126 kg (278 lb 10.6 oz)   08/08/20 1703 127 kg (279 lb 15.8 oz)   08/08/20 1702 127 kg (280 lb)       Objective    Objective:  General Appearance:  Comfortable, fairly well-appearing, in no acute distress and not in pain.  Awake, alert, oriented  HEENT: Mucous membranes moist, no injury, oropharynx clear  Lungs:  Normal effort and normal respiratory rate.  Breath sounds clear to auscultation.  No  respiratory distress.  No rales, decreased breath sounds or rhonchi.    Heart: Normal rate.  Regular rhythm.  S1 normal.  No murmur.   Abdomen: Abdomen is soft.  Bowel sounds are normal, no abdominal tenderness.  There is no rebound or guarding  Extremities: Normal range of motion.  No significant edema of bilateral lower extremities, distal pulses intact  Neurological: No focal motor or sensory deficits, pupils reactive  Skin:  Warm and dry.  No rash or cyanosis.       Results Review:    Intake/Output:     Intake/Output Summary (Last 24 hours) at 8/10/2020 1335  Last data filed at 8/10/2020 1211  Gross per 24 hour   Intake 1085 ml   Output 350 ml   Net 735 ml         DATA:  Radiology and Labs:  The following labs independently reviewed by me. Additional labs ordered for tomorrow a.m.  Interval notes, chart personally  reviewed by me.   Old records independently reviewed showing CKD 3  The following radiologic studies independently viewed by me, findings chest x-ray no acute disease mild vascular congestion  Discussed with patient and family at bedside    Risk/ complexity of medical care/ medical decision making  moderate    Labs:   Recent Results (from the past 24 hour(s))   Adult Transthoracic Echo Complete W/ Cont if Necessary Per Protocol    Collection Time: 08/09/20  3:04 PM   Result Value Ref Range    BSA 2.5 m^2    IVSd 1.6 cm    LVIDd 4.7 cm    LVIDs 3.9 cm    LVPWd 1.6 cm    IVS/LVPW 1.0     FS 17.0 %    EDV(Teich) 102.4 ml    ESV(Teich) 65.9 ml    EF(Teich) 35.6 %    EDV(cubed) 103.8 ml    ESV(cubed) 59.3 ml    EF(cubed) 42.9 %    LV mass(C)d 324.4 grams    LV mass(C)dI 131.3 grams/m^2    SV(Teich) 36.5 ml    SI(Teich) 14.8 ml/m^2    SV(cubed) 44.5 ml    SI(cubed) 18.0 ml/m^2    Ao root diam 3.3 cm    Ao root area 8.6 cm^2    asc Aorta Diam 3.5 cm    LVOT diam 2.5 cm    LVOT area 4.9 cm^2    LVOT area(traced) 4.9 cm^2    RVOT diam 2.1 cm    RVOT area 3.5 cm^2    LVLd ap4 8.9 cm    EDV(MOD-sp4) 103.0 ml    LVLs ap4 8.6 cm    ESV(MOD-sp4) 57.0 ml    EF(MOD-sp4) 44.7 %    LVLd ap2 8.7 cm    EDV(MOD-sp2) 70.0 ml    LVLs ap2 7.8 cm    ESV(MOD-sp2) 34.0 ml    EF(MOD-sp2) 51.4 %    SV(MOD-sp4) 46.0 ml    SI(MOD-sp4) 18.6 ml/m^2    SV(MOD-sp2) 36.0 ml    SI(MOD-sp2) 14.6 ml/m^2    Ao root area (BSA corrected) 1.3     LV Villagomez Vol (BSA corrected) 41.7 ml/m^2    LV Sys Vol (BSA corrected) 23.1 ml/m^2    EF(MOD-bp) 46.7 %    MV A dur 121.0 sec    MV E max matthew 68.1 cm/sec    MV A max matthew 84.4 cm/sec    MV E/A 0.81     MV V2 max 89.4 cm/sec    MV max PG 3.2 mmHg    MV V2 mean 52.3 cm/sec    MV mean PG 1.0 mmHg    MV V2 VTI 25.8 cm    MVA(VTI) 3.6 cm^2    MV P1/2t max matthew 91.2 cm/sec    MV P1/2t 63.4 msec    MVA(P1/2t) 3.5 cm^2    MV dec slope 421.0 cm/sec^2    MV dec time 156 sec    Ao pk matthew 131.0 cm/sec    Ao max PG 6.9 mmHg    Ao  max PG (full) 1.8 mmHg    Ao V2 mean 83.4 cm/sec    Ao mean PG 3.0 mmHg    Ao mean PG (full) 1.0 mmHg    Ao V2 VTI 23.9 cm    HAWA(I,A) 3.9 cm^2    HAWA(I,D) 3.9 cm^2    HAWA(V,A) 4.2 cm^2    HAWA(V,D) 4.2 cm^2    LV V1 max PG 5.0 mmHg    LV V1 mean PG 2.0 mmHg    LV V1 max 112.0 cm/sec    LV V1 mean 70.8 cm/sec    LV V1 VTI 19.0 cm    SV(Ao) 204.4 ml    SI(Ao) 82.8 ml/m^2    SV(LVOT) 93.3 ml    SV(RVOT) 47.5 ml    SI(LVOT) 37.8 ml/m^2    PA V2 max 120.0 cm/sec    PA max PG 5.8 mmHg    PA max PG (full) 3.3 mmHg     CV ECHO CRISTOPHER - PVA(V,A) 2.3 cm^2     CV ECHO CRISTOPHER - PVA(V,D) 2.3 cm^2    PA acc time 0.13 sec    RV V1 max PG 2.5 mmHg    RV V1 mean PG 1.0 mmHg    RV V1 max 79.0 cm/sec    RV V1 mean 48.5 cm/sec    RV V1 VTI 13.7 cm    TR max javier 247.0 cm/sec    PA pr(Accel) 21.9 mmHg    Pulm Sys Javier 66.2 cm/sec    Pulm Villagomez Javier 37.0 cm/sec    Pulm S/D 1.8     Qp/Qs 0.51     Pulm A Revs Dur 0.12 sec    Pulm A Revs Javier 17.0 cm/sec    MVA P1/2T LCG 2.4 cm^2    RV Base 3.9 cm    RV Length 7.4 cm    Lat Peak E' Javier 12.6 cm/sec    Med Peak E' Javier 13.6 cm/sec    RV S' 16.6 cm/sec     CV ECHO CRISTOPHER - BZI_BMI 36.8 kilograms/m^2     CV ECHO CRISTOPHER - BSA([facility]) 2.6 m^2     CV ECHO CRISTOPHER - BZI_METRIC_WEIGHT 126.0 kg     CV ECHO CRISTOPHER - BZI_METRIC_HEIGHT 185.0 cm    Avg E/e' ratio 5.20     Target HR (85%) 152 bpm    Max. Pred. HR (100%) 179 bpm    Dimensionless Index 0.8 (DI)    RAP systole 8 mmHg    RVSP(TR) 32 mmHg    TR max PG 24 mmHg    TAPSE (>1.6) 2.90 cm2   POC Glucose Once    Collection Time: 08/09/20  4:03 PM   Result Value Ref Range    Glucose 136 (H) 70 - 130 mg/dL   Comprehensive Metabolic Panel    Collection Time: 08/10/20  4:47 AM   Result Value Ref Range    Glucose 97 65 - 99 mg/dL    BUN 26 (H) 6 - 20 mg/dL    Creatinine 2.04 (H) 0.76 - 1.27 mg/dL    Sodium 137 136 - 145 mmol/L    Potassium 3.7 3.5 - 5.2 mmol/L    Chloride 106 98 - 107 mmol/L    CO2 23.4 22.0 - 29.0 mmol/L    Calcium 8.3 (L) 8.6 - 10.5 mg/dL     Total Protein 5.4 (L) 6.0 - 8.5 g/dL    Albumin 2.50 (L) 3.50 - 5.20 g/dL    ALT (SGPT) 11 1 - 41 U/L    AST (SGOT) 31 1 - 40 U/L    Alkaline Phosphatase 53 39 - 117 U/L    Total Bilirubin 0.2 0.0 - 1.2 mg/dL    eGFR Non African Amer 36 (L) >60 mL/min/1.73    Globulin 2.9 gm/dL    A/G Ratio 0.9 g/dL    BUN/Creatinine Ratio 12.7 7.0 - 25.0    Anion Gap 7.6 5.0 - 15.0 mmol/L   BNP    Collection Time: 08/10/20  4:47 AM   Result Value Ref Range    proBNP 2,871.0 (H) 0.0 - 450.0 pg/mL   TSH    Collection Time: 08/10/20  4:47 AM   Result Value Ref Range    TSH 2.870 0.270 - 4.200 uIU/mL   Lipid Panel    Collection Time: 08/10/20  4:47 AM   Result Value Ref Range    Total Cholesterol 284 (H) 0 - 200 mg/dL    Triglycerides 353 (H) 0 - 150 mg/dL    HDL Cholesterol 36 (L) 40 - 60 mg/dL    LDL Cholesterol  177 (H) 0 - 100 mg/dL    VLDL Cholesterol 70.6 (H) 5 - 40 mg/dL    LDL/HDL Ratio 4.93    CBC Auto Differential    Collection Time: 08/10/20  4:47 AM   Result Value Ref Range    WBC 8.97 3.40 - 10.80 10*3/mm3    RBC 4.56 4.14 - 5.80 10*6/mm3    Hemoglobin 13.7 13.0 - 17.7 g/dL    Hematocrit 39.3 37.5 - 51.0 %    MCV 86.2 79.0 - 97.0 fL    MCH 30.0 26.6 - 33.0 pg    MCHC 34.9 31.5 - 35.7 g/dL    RDW 12.9 12.3 - 15.4 %    RDW-SD 40.2 37.0 - 54.0 fl    MPV 10.2 6.0 - 12.0 fL    Platelets 192 140 - 450 10*3/mm3    Neutrophil % 73.3 42.7 - 76.0 %    Lymphocyte % 13.6 (L) 19.6 - 45.3 %    Monocyte % 8.0 5.0 - 12.0 %    Eosinophil % 3.9 0.3 - 6.2 %    Basophil % 0.6 0.0 - 1.5 %    Immature Grans % 0.6 (H) 0.0 - 0.5 %    Neutrophils, Absolute 6.58 1.70 - 7.00 10*3/mm3    Lymphocytes, Absolute 1.22 0.70 - 3.10 10*3/mm3    Monocytes, Absolute 0.72 0.10 - 0.90 10*3/mm3    Eosinophils, Absolute 0.35 0.00 - 0.40 10*3/mm3    Basophils, Absolute 0.05 0.00 - 0.20 10*3/mm3    Immature Grans, Absolute 0.05 0.00 - 0.05 10*3/mm3    nRBC 0.0 0.0 - 0.2 /100 WBC       Radiology:  Imaging Results (Last 24 Hours)     ** No results found for the last  24 hours. **             Medications have been reviewed:  Current Facility-Administered Medications   Medication Dose Route Frequency Provider Last Rate Last Dose   • acetaminophen (TYLENOL) tablet 650 mg  650 mg Oral Q4H PRN Jason Carvalho MD   650 mg at 08/08/20 2055   • amLODIPine (NORVASC) tablet 10 mg  10 mg Oral Q24H Mono Roberts MD   10 mg at 08/10/20 0746   • asenapine maleate (SAPHRIS) sublingual tablet 5 mg  5 mg Sublingual Nightly Mono Roberts MD   5 mg at 08/09/20 2202   • aspirin EC tablet 325 mg  325 mg Oral Daily Jason Carvalho MD   325 mg at 08/10/20 0747   • atorvastatin (LIPITOR) tablet 80 mg  80 mg Oral Nightly Mono Roberts MD       • Brexpiprazole tablet 3 mg  3 mg Oral Daily Mono Roberts MD   3 mg at 08/10/20 0747   • buPROPion SR (WELLBUTRIN SR) 12 hr tablet 200 mg  200 mg Oral Q12H Mono Roberts MD   200 mg at 08/10/20 0747   • clopidogrel (PLAVIX) tablet 75 mg  75 mg Oral Daily Jason Carvalho MD   75 mg at 08/10/20 0747   • DULoxetine (CYMBALTA) DR capsule 120 mg  120 mg Oral Daily Mono Roberts MD   120 mg at 08/10/20 0747   • famotidine (PEPCID) tablet 20 mg  20 mg Oral BID Mono Roberts MD   20 mg at 08/10/20 0751   • FLUoxetine (PROzac) capsule 100 mg  100 mg Oral Daily Mono Roberts MD   100 mg at 08/10/20 0747   • hydrALAZINE (APRESOLINE) injection 10 mg  10 mg Intravenous Q4H PRN Mono Roberts MD       • hydrALAZINE (APRESOLINE) tablet 25 mg  25 mg Oral Q8H Mono Roberts MD       • lamoTRIgine (LaMICtal) tablet 400 mg  400 mg Oral Daily Mono Roberts MD   400 mg at 08/10/20 0747   • metoprolol tartrate (LOPRESSOR) tablet 25 mg  25 mg Oral Q12H Mono Roberts MD   25 mg at 08/10/20 0746   • sodium chloride 0.9 % flush 10 mL  10 mL Intravenous PRN Jason Carvalho MD       • sodium chloride 0.9 % infusion  50 mL/hr Intravenous Continuous Fabien Lee MD           ASSESSMENT:  Acute kidney injury, prerenal, improved  CKD stage III secondary to  diabetes  Proteinuria  Type 2 diabetes mellitus, borderline control  Acute MI  Coronary artery disease status post PTCA  Hypertension, poorly controlled  Hyperlipidemia      Plan:  Continue IV fluids another 24 hours but decrease rate to help with hypertension control  BP management per cardiology  Recheck labs in a.m., status post PTCI  Continue to hold ARB and any diuretics  Eventually would benefit from restarting ARB once renal function stabilizes  Recheck labs in a.m.      Fabien Lee MD   [facility]   Office phone number: [phone]  Answering service phone number: [phone]    08/10/20  13:35    Dictation performed using Dragon dictation software

## 2020-08-10 NOTE — PROGRESS NOTES
[facility]  Cardiology Progress Note    Patient Identification:  Name: Jai Gayle  Age: 40 y.o.  Sex: male  :  1980  MRN: [id number]                 Follow Up / Chief Complaint: Chest pain    Interval History:  Patient admitted with acute myocardial infarction underwent angioplasty and stent without any problems and complications.  Nephrology will continue IV fluids overnight.     Subjective:       Objective:    Past Medical History:  No past medical history on file.  Past Surgical History:  Past Surgical History:   Procedure Laterality Date   • CARDIAC CATHETERIZATION N/A 2020    Procedure: Left Heart Cath;  Surgeon: Jason Carvalho MD;  Location: [facility] CATH INVASIVE LOC[facility];  Service: Cardiovascular;  Laterality: N/A;   • CARDIAC CATHETERIZATION N/A 2020    Procedure: Coronary angiography;  Surgeon: Jason Carvalho MD;  Location:  [facility] CATH INVASIVE LOCATION;  Service: Cardiovascular;  Laterality: N/A;   • CARDIAC CATHETERIZATION N/A 2020    Procedure: Left ventriculography;  Surgeon: Jason Carvalho MD;  Location: [facility] CATH INVASIVE LOCATION;  Service: Cardiovascular;  Laterality: N/A;   • CARDIAC CATHETERIZATION N/A 2020    Procedure: Stent MEE coronary;  Surgeon: Jason Carvalho MD;  Location:  [facility] CATH INVASIVE LOCATION;  Service: Cardiovascular;  Laterality: N/A;        Social History:   Social History     Tobacco Use   • Smoking status: Current Every Day Smoker     Packs/day: 1.50     Types: Cigarettes   • Smokeless tobacco: Never Used   Substance Use Topics   • Alcohol use: No      Family History:  No family history on file.       Allergies:  No Known Allergies  Scheduled Meds:    amLODIPine 10 mg Q24H   asenapine maleate 5 mg Nightly   aspirin  mg Daily   atorvastatin 80 mg Nightly   Brexpiprazole 3 mg Daily   buPROPion  mg Q12H   clopidogrel 75 mg Daily   DULoxetine 120 mg Daily   famotidine 20 mg BID   FLUoxetine  "100 mg Daily   hydrALAZINE 25 mg Q8H   lamoTRIgine 400 mg Daily   metoprolol tartrate 25 mg Q12H           INTAKE AND OUTPUT:    Intake/Output Summary (Last 24 hours) at 8/10/2020 1748  Last data filed at 8/10/2020 1659  Gross per 24 hour   Intake 840 ml   Output --   Net 840 ml     ROS  Constitutional: Awake and alert, no fever. No nosebleeds  Abdomen           no abdominal pain   Cardiac              no chest pain  Pulmonary          no shortness of breath      /95   Pulse 69   Temp 98.1 °F (36.7 °C) (Temporal)   Resp 15   Ht 185 cm (72.84\")   Wt 126 kg (276 lb 10.8 oz)   SpO2 98%   BMI 36.67 kg/m²   General appearance: No acute changes   Neck: Trachea midline; NECK, supple, no thyromegaly or lymphadenopathy   Lungs: Normal size and shape, normal breath sounds, equal distribution of air, no rales and rhonchi   CV: S1-S2 regular, no murmurs, no rub, no gallop   Abdomen: Soft, non-tender; no masses , no abnormal abdominal sounds   Extremities: No deformity , normal color , no peripheral edema   Skin: Normal temperature, turgor and texture; no rash, ulcers            Cardiographics  Telemetry:     ECG:     Echocardiogram:   HEMODYNAMIC / ANGIOGRAPHIC DATA:    1. Left ventricular end diastolic pressure was 10 mmHg.  2. Left ventriculography revealed an EF around 55 %.    3. The left main is normal left main.  4. The left anterior descending artery is *.Left anterior descending shows early atherosclerotic plaque with the first large diagonal branch at the origin was 80 to 90% stenosis  5. The left circumflex is .Circumflex artery was nondominant with early atherosclerotic plaque  6. The right coronary artery is .Right coronary artery was dominant with a proximal 100% reduced to 0% with 3.5/23 Xience stent dilated to 3.6  7. Successful angioplasty and stent to 100% occluded proximal RCA to 0% with 3.5/23 Xience stent dilated to 3.6     SKYLER FLOW    PRE...0....       POST....3..     TYPE OF " "LESION......C.......     RECOMMENDATIONS:  Post-procedure care will focus on prevention of any ischemic events and congestive complications.  Aggressive risk factor modification will be carried out.  Importance of taking dual antiplatelets for one year  has been explained, risk of stent thrombosis leading to the acute MI, which carries high morbidity and mortality has been explained     Discontinuation or interruptions of these medications should be under the strict guidance of appropriate health professional           Lab Review   Results from last 7 days   Lab Units 08/08/20  1715   TROPONIN T ng/mL <0.010         Results from last 7 days   Lab Units 08/10/20  0447   SODIUM mmol/L 137   POTASSIUM mmol/L 3.7   BUN mg/dL 26*   CREATININE mg/dL 2.04*   CALCIUM mg/dL 8.3*        Results from last 7 days   Lab Units 08/10/20  0447 08/09/20  0358 08/08/20  1715   WBC 10*3/mm3 8.97 12.28* 9.85   HEMOGLOBIN g/dL 13.7 14.5 14.6   HEMATOCRIT % 39.3 41.1 43.0   PLATELETS 10*3/mm3 192 290 225     Results from last 7 days   Lab Units 08/08/20  1715   INR  0.91   APTT seconds 26.9     The following medical decision was discussed in detail with Dr. Carvalho    Assessment:  1. Status post acute myocardial infarction  2. Status post angioplasty stent  3. Hypertension  4. Obesity      Plan:  Vital signs are stable.  He remained stable after cardiac cath.  Continue Lopressor, hydralazine, Plavix, Lipitor, aspirin and amlodipine.       )8/10/2020  PAUL Hassan    Patient personally interviewed and above subjective findings personally confirmed during a face to face contact with patient today  All findings of physical examination confirmed  All pertinent and performed labs, cardiac procedures ,  radiographs of the last 24 hours personally reviewed  Impression and plans discussed/elaborated and implemented jointly as described above     Jason Carvalho MD            EMR Dragon/Transcription:   \"Dictated utilizing " "Dragon dictation\".     "

## 2020-08-10 NOTE — PROGRESS NOTES
"Daily progress note    Chief complaint  Doing better  Denies any chest pain shortness of breath palpitation  No new complaints    History of present illness  40-year white male with history of hypertension and very significant psych history presented to [facility], [place], operated by [facility]'s emergency room with right-sided chest discomfort started 15 minutes prior to presentation radiates to right arm but no associated symptoms including shortness of breath nausea vomiting but very diaphoretic.  Patient evaluated in ER found to have acute inferior wall MI taken to Cath Lab and underwent PCI and stent placement and I am asked to follow the patient for medical problem.  Patient has no chest pain at the time of interview.  Patient also denies any fever cough congestion abdominal pain nausea vomiting diarrhea.      REVIEW OF SYSTEMS  Constitutional: Negative for chills and fever.   Respiratory: Negative for shortness of breath.    Cardiovascular: Positive for chest pain. Negative for palpitations and leg swelling.   Gastrointestinal: Negative for nausea and vomiting.      PHYSICAL EXAM  Blood pressure (!) 161/106, pulse 71, temperature 96.9 °F (36.1 °C), temperature source Oral, resp. rate 18, height 185 cm (72.84\"), weight 126 kg (277 lb 12.5 oz), SpO2 94 %.    Constitutional: He is oriented to person, place, and time. He appears distressed (moderate).  Head: Atraumatic.   Eyes: EOM are normal.   Neck: Normal range of motion. Neck supple. No JVD present. No tracheal deviation present.   Cardiovascular: Normal rate, regular rhythm, normal heart sounds and intact distal pulses.   Pulmonary/Chest: Effort normal and breath sounds normal. No respiratory distress. He has no wheezes. He has no rales.   Abdominal: Soft. Bowel sounds are normal. He exhibits no distension. There is no tenderness. There is no rebound and no guarding.   Musculoskeletal: Normal range of motion. He exhibits no edema or tenderness.   Lymphadenopathy: He has no cervical " adenopathy.   Neurological: He is alert and oriented to person, place, and time.   Skin: Skin is warm. He is diaphoretic. No erythema.     LAB RESULTS  Lab Results (last 24 hours)     Procedure Component Value Units Date/Time    BNP [[id number]]  (Abnormal) Collected:  08/10/20 0447    Specimen:  Blood Updated:  08/10/20 0614     proBNP 2,871.0 pg/mL     Narrative:       Among patients with dyspnea, NT-proBNP is highly sensitive for the detection of acute congestive heart failure. In addition NT-proBNP of <300 pg/ml effectively rules out acute congestive heart failure with 99% negative predictive value.    Results may be falsely decreased if patient taking Biotin.      TSH [[id number]]  (Normal) Collected:  08/10/20 0447    Specimen:  Blood Updated:  08/10/20 0614     TSH 2.870 uIU/mL     Comprehensive Metabolic Panel [[id number]]  (Abnormal) Collected:  08/10/20 0447    Specimen:  Blood Updated:  08/10/20 0556     Glucose 97 mg/dL      BUN 26 mg/dL      Creatinine 2.04 mg/dL      Sodium 137 mmol/L      Potassium 3.7 mmol/L      Chloride 106 mmol/L      CO2 23.4 mmol/L      Calcium 8.3 mg/dL      Total Protein 5.4 g/dL      Albumin 2.50 g/dL      ALT (SGPT) 11 U/L      AST (SGOT) 31 U/L      Alkaline Phosphatase 53 U/L      Total Bilirubin 0.2 mg/dL      eGFR Non African Amer 36 mL/min/1.73      Globulin 2.9 gm/dL      A/G Ratio 0.9 g/dL      BUN/Creatinine Ratio 12.7     Anion Gap 7.6 mmol/L     Narrative:       GFR Normal >60  Chronic Kidney Disease <60  Kidney Failure <15      Lipid Panel [[id number]]  (Abnormal) Collected:  08/10/20 0447    Specimen:  Blood Updated:  08/10/20 0556     Total Cholesterol 284 mg/dL      Triglycerides 353 mg/dL      HDL Cholesterol 36 mg/dL      LDL Cholesterol  177 mg/dL      VLDL Cholesterol 70.6 mg/dL      LDL/HDL Ratio 4.93    Narrative:       Cholesterol Reference Ranges  (U.S. Department of Health and Human Services ATP III Classifications)    Desirable          <200  mg/dL  Borderline High    200-239 mg/dL  High Risk          >240 mg/dL      Triglyceride Reference Ranges  (U.S. Department of Health and Human Services ATP III Classifications)    Normal           <150 mg/dL  Borderline High  150-199 mg/dL  High             200-499 mg/dL  Very High        >500 mg/dL    HDL Reference Ranges  (U.S. Department of Health and Human Services ATP III Classifcations)    Low     <40 mg/dl (major risk factor for CHD)  High    >60 mg/dl ('negative' risk factor for CHD)        LDL Reference Ranges  (U.S. Department of Health and Human Services ATP III Classifcations)    Optimal          <100 mg/dL  Near Optimal     100-129 mg/dL  Borderline High  130-159 mg/dL  High             160-189 mg/dL  Very High        >189 mg/dL    CBC & Differential [[id number]] Collected:  08/10/20 0447    Specimen:  Blood Updated:  08/10/20 0532    Narrative:       The following orders were created for panel order CBC & Differential.  Procedure                               Abnormality         Status                     ---------                               -----------         ------                     CBC Auto Differential[[id number]]        Abnormal            Final result                 Please view results for these tests on the individual orders.    CBC Auto Differential [[id number]]  (Abnormal) Collected:  08/10/20 0447    Specimen:  Blood Updated:  08/10/20 0532     WBC 8.97 10*3/mm3      RBC 4.56 10*6/mm3      Hemoglobin 13.7 g/dL      Hematocrit 39.3 %      MCV 86.2 fL      MCH 30.0 pg      MCHC 34.9 g/dL      RDW 12.9 %      RDW-SD 40.2 fl      MPV 10.2 fL      Platelets 192 10*3/mm3      Neutrophil % 73.3 %      Lymphocyte % 13.6 %      Monocyte % 8.0 %      Eosinophil % 3.9 %      Basophil % 0.6 %      Immature Grans % 0.6 %      Neutrophils, Absolute 6.58 10*3/mm3      Lymphocytes, Absolute 1.22 10*3/mm3      Monocytes, Absolute 0.72 10*3/mm3      Eosinophils, Absolute 0.35 10*3/mm3      Basophils,  Absolute 0.05 10*3/mm3      Immature Grans, Absolute 0.05 10*3/mm3      nRBC 0.0 /100 WBC     POC Glucose Once [[id number]]  (Abnormal) Collected:  08/09/20 1603    Specimen:  Blood Updated:  08/09/20 1604     Glucose 136 mg/dL         Imaging Results (Last 24 Hours)     ** No results found for the last 24 hours. **        ECG 12 Lead        HEART RATE= 81  bpm  RR Interval= 740  ms  NC Interval= 177  ms  P Horizontal Axis= -30  deg  P Front Axis= -7  deg  QRSD Interval= 104  ms  QT Interval= 378  ms  QRS Axis= 38  deg  T Wave Axis= 101  deg  - ABNORMAL ECG -  Sinus rhythm  Inferior infarct, acute             Current Facility-Administered Medications:   •  acetaminophen (TYLENOL) tablet 650 mg, 650 mg, Oral, Q4H PRN, Jason Carvalho MD, 650 mg at 08/08/20 2055  •  amLODIPine (NORVASC) tablet 10 mg, 10 mg, Oral, Q24H, Mono Roberts MD, 10 mg at 08/10/20 0746  •  asenapine maleate (SAPHRIS) sublingual tablet 5 mg, 5 mg, Sublingual, Nightly, Mono Roberts MD, 5 mg at 08/09/20 2202  •  aspirin EC tablet 325 mg, 325 mg, Oral, Daily, Jason Carvalho MD, 325 mg at 08/10/20 0747  •  atorvastatin (LIPITOR) tablet 40 mg, 40 mg, Oral, Nightly, Elizabeth Villafana APRN  •  Brexpiprazole tablet 3 mg, 3 mg, Oral, Daily, Mono Roberts MD, 3 mg at 08/10/20 0747  •  buPROPion SR (WELLBUTRIN SR) 12 hr tablet 200 mg, 200 mg, Oral, Q12H, Mono Roberts MD, 200 mg at 08/10/20 0747  •  clopidogrel (PLAVIX) tablet 75 mg, 75 mg, Oral, Daily, Jason Carvalho MD, 75 mg at 08/10/20 0747  •  DULoxetine (CYMBALTA) DR capsule 120 mg, 120 mg, Oral, Daily, Mono Roberts MD, 120 mg at 08/10/20 0747  •  famotidine (PEPCID) tablet 20 mg, 20 mg, Oral, BID, Mono Roberts MD, 20 mg at 08/10/20 0751  •  FLUoxetine (PROzac) capsule 100 mg, 100 mg, Oral, Daily, oMno Roberts MD, 100 mg at 08/10/20 0747  •  lamoTRIgine (LaMICtal) tablet 400 mg, 400 mg, Oral, Daily, Mono Roberts MD, 400 mg at 08/10/20 0747  •  metoprolol tartrate  (LOPRESSOR) tablet 25 mg, 25 mg, Oral, Q12H, Too Roberts MD, 25 mg at 08/10/20 0746  •  sodium chloride 0.45 % 937.5 mL with sodium bicarbonate 8.4 % 62.5 mEq infusion, , Intravenous, Continuous, Chalino Bentley MD, Last Rate: 100 mL/hr at 08/10/20 0837  •  [COMPLETED] Insert peripheral IV, , , Once **AND** sodium chloride 0.9 % flush 10 mL, 10 mL, Intravenous, PRN, Jason Carvalho MD     ASSESSMENT  Acute inferior wall MI status post PCI and stent placement  Coronary artery disease  Hypertension  Hyperlipidemia  Depression  Significant psych history  Acute kidney injury  Chronic kidney disease stage III  Gastroesophageal reflux disease    PLAN  CPM  Aspirin Plavix Lopressor  IV fluids per nephrology  Adjust home medications  Stress ulcer DVT prophylaxis  Supportive care  Discussed with nursing staff and cardiology  Follow closely further recommendation current hospital course    TOO ROBERTS MD

## 2020-08-11 VITALS
RESPIRATION RATE: 15 BRPM | WEIGHT: 276.68 LBS | HEIGHT: 73 IN | DIASTOLIC BLOOD PRESSURE: 95 MMHG | SYSTOLIC BLOOD PRESSURE: 143 MMHG | OXYGEN SATURATION: 98 % | TEMPERATURE: 98.1 F | BODY MASS INDEX: 36.67 KG/M2 | HEART RATE: 69 BPM

## 2020-08-11 LAB
ACT BLD: 235 SECONDS (ref 82–152)
ALBUMIN SERPL-MCNC: 2.2 G/DL (ref 3.5–5.2)
ANION GAP SERPL CALCULATED.3IONS-SCNC: 7.8 MMOL/L (ref 5–15)
BASOPHILS # BLD AUTO: 0.05 10*3/MM3 (ref 0–0.2)
BASOPHILS NFR BLD AUTO: 0.6 % (ref 0–1.5)
BUN SERPL-MCNC: 26 MG/DL (ref 6–20)
BUN/CREAT SERPL: 14.1 (ref 7–25)
CALCIUM SPEC-SCNC: 7.9 MG/DL (ref 8.6–10.5)
CHLORIDE SERPL-SCNC: 107 MMOL/L (ref 98–107)
CO2 SERPL-SCNC: 23.2 MMOL/L (ref 22–29)
CREAT SERPL-MCNC: 1.85 MG/DL (ref 0.76–1.27)
DEPRECATED RDW RBC AUTO: 40.3 FL (ref 37–54)
EOSINOPHIL # BLD AUTO: 0.33 10*3/MM3 (ref 0–0.4)
EOSINOPHIL NFR BLD AUTO: 4.2 % (ref 0.3–6.2)
ERYTHROCYTE [DISTWIDTH] IN BLOOD BY AUTOMATED COUNT: 13 % (ref 12.3–15.4)
GFR SERPL CREATININE-BSD FRML MDRD: 40 ML/MIN/1.73
GLUCOSE SERPL-MCNC: 100 MG/DL (ref 65–99)
HCT VFR BLD AUTO: 39.7 % (ref 37.5–51)
HGB BLD-MCNC: 13.6 G/DL (ref 13–17.7)
IMM GRANULOCYTES # BLD AUTO: 0.03 10*3/MM3 (ref 0–0.05)
IMM GRANULOCYTES NFR BLD AUTO: 0.4 % (ref 0–0.5)
LYMPHOCYTES # BLD AUTO: 1.17 10*3/MM3 (ref 0.7–3.1)
LYMPHOCYTES NFR BLD AUTO: 14.9 % (ref 19.6–45.3)
MCH RBC QN AUTO: 29.7 PG (ref 26.6–33)
MCHC RBC AUTO-ENTMCNC: 34.3 G/DL (ref 31.5–35.7)
MCV RBC AUTO: 86.7 FL (ref 79–97)
MONOCYTES # BLD AUTO: 0.72 10*3/MM3 (ref 0.1–0.9)
MONOCYTES NFR BLD AUTO: 9.2 % (ref 5–12)
NEUTROPHILS NFR BLD AUTO: 5.53 10*3/MM3 (ref 1.7–7)
NEUTROPHILS NFR BLD AUTO: 70.7 % (ref 42.7–76)
NRBC BLD AUTO-RTO: 0 /100 WBC (ref 0–0.2)
PHOSPHATE SERPL-MCNC: 3.4 MG/DL (ref 2.5–4.5)
PLATELET # BLD AUTO: 189 10*3/MM3 (ref 140–450)
PMV BLD AUTO: 10.1 FL (ref 6–12)
POTASSIUM SERPL-SCNC: 3.7 MMOL/L (ref 3.5–5.2)
RBC # BLD AUTO: 4.58 10*6/MM3 (ref 4.14–5.8)
SODIUM SERPL-SCNC: 138 MMOL/L (ref 136–145)
WBC # BLD AUTO: 7.83 10*3/MM3 (ref 3.4–10.8)

## 2020-08-11 PROCEDURE — 80069 RENAL FUNCTION PANEL: CPT | Performed by: INTERNAL MEDICINE

## 2020-08-11 PROCEDURE — 99238 HOSP IP/OBS DSCHRG MGMT 30/<: CPT | Performed by: INTERNAL MEDICINE

## 2020-08-11 PROCEDURE — 85025 COMPLETE CBC W/AUTO DIFF WBC: CPT | Performed by: HOSPITALIST

## 2020-08-11 RX ORDER — LOSARTAN POTASSIUM 25 MG/1
25 TABLET ORAL
Status: DISCONTINUED | OUTPATIENT
Start: 2020-08-11 | End: 2020-08-11 | Stop reason: HOSPADM

## 2020-08-11 RX ORDER — HYDRALAZINE HYDROCHLORIDE 25 MG/1
25 TABLET, FILM COATED ORAL EVERY 8 HOURS SCHEDULED
Qty: 90 TABLET | Refills: 12 | Status: SHIPPED | OUTPATIENT
Start: 2020-08-11 | End: 2021-09-01 | Stop reason: SDUPTHER

## 2020-08-11 RX ORDER — LOSARTAN POTASSIUM 25 MG/1
25 TABLET ORAL
Qty: 30 TABLET | Refills: 12 | Status: SHIPPED | OUTPATIENT
Start: 2020-08-12 | End: 2020-08-21

## 2020-08-11 RX ORDER — FAMOTIDINE 20 MG/1
20 TABLET, FILM COATED ORAL 2 TIMES DAILY
Qty: 60 TABLET | Refills: 1 | Status: SHIPPED | OUTPATIENT
Start: 2020-08-11 | End: 2020-10-12

## 2020-08-11 RX ORDER — AMLODIPINE BESYLATE 10 MG/1
10 TABLET ORAL
Qty: 30 TABLET | Refills: 12 | Status: SHIPPED | OUTPATIENT
Start: 2020-08-12 | End: 2021-04-20 | Stop reason: SDUPTHER

## 2020-08-11 RX ORDER — CLOPIDOGREL BISULFATE 75 MG/1
75 TABLET ORAL DAILY
Qty: 30 TABLET | Refills: 12 | Status: SHIPPED | OUTPATIENT
Start: 2020-08-12 | End: 2021-05-04 | Stop reason: SDUPTHER

## 2020-08-11 RX ORDER — ATORVASTATIN CALCIUM 80 MG/1
80 TABLET, FILM COATED ORAL NIGHTLY
Qty: 30 TABLET | Refills: 12 | Status: SHIPPED | OUTPATIENT
Start: 2020-08-11 | End: 2021-05-04 | Stop reason: SDUPTHER

## 2020-08-11 RX ADMIN — HYDRALAZINE HYDROCHLORIDE 25 MG: 25 TABLET, FILM COATED ORAL at 13:17

## 2020-08-11 RX ADMIN — LAMOTRIGINE 400 MG: 200 TABLET ORAL at 06:24

## 2020-08-11 RX ADMIN — ASPIRIN 325 MG: 325 TABLET, COATED ORAL at 06:59

## 2020-08-11 RX ADMIN — CLOPIDOGREL 75 MG: 75 TABLET, FILM COATED ORAL at 06:19

## 2020-08-11 RX ADMIN — BUPROPION HYDROCHLORIDE 200 MG: 200 TABLET, EXTENDED RELEASE ORAL at 06:25

## 2020-08-11 RX ADMIN — FAMOTIDINE 20 MG: 20 TABLET, FILM COATED ORAL at 06:19

## 2020-08-11 RX ADMIN — HYDRALAZINE HYDROCHLORIDE 25 MG: 25 TABLET, FILM COATED ORAL at 06:19

## 2020-08-11 RX ADMIN — SODIUM CHLORIDE 50 ML/HR: 9 INJECTION, SOLUTION INTRAVENOUS at 07:54

## 2020-08-11 RX ADMIN — AMLODIPINE BESYLATE 10 MG: 10 TABLET ORAL at 06:59

## 2020-08-11 RX ADMIN — LOSARTAN POTASSIUM 25 MG: 25 TABLET, FILM COATED ORAL at 13:17

## 2020-08-11 RX ADMIN — BREXPIPRAZOLE 3 MG: 3 TABLET ORAL at 06:18

## 2020-08-11 RX ADMIN — FLUOXETINE HYDROCHLORIDE 100 MG: 20 CAPSULE ORAL at 06:58

## 2020-08-11 RX ADMIN — METOPROLOL TARTRATE 25 MG: 25 TABLET, FILM COATED ORAL at 06:19

## 2020-08-11 RX ADMIN — DULOXETINE HYDROCHLORIDE 120 MG: 60 CAPSULE, DELAYED RELEASE ORAL at 06:18

## 2020-08-11 NOTE — PROGRESS NOTES
LOS: 3 days     Chief Complaint/ Reason for encounter: Acute on chronic renal failure  Chief Complaint   Patient presents with   • Chest Pain         Subjective   Patient is doing well today with no new complaints  Good appetite with no nausea or vomiting  No shortness of breath chest pain or edema  Voiding well with no dysuria  Status post PTCI  Blood pressure improved      Medical history reviewed:  History of Present Illness    Subjective    History taken from: Patient and chart    Vital Signs  Temp:  [96.5 °F (35.8 °C)-98.2 °F (36.8 °C)] 98.1 °F (36.7 °C)  Heart Rate:  [64-72] 69  Resp:  [15-18] 15  BP: (136-158)/() 143/95       Wt Readings from Last 1 Encounters:   08/11/20 0300 126 kg (276 lb 10.8 oz)   08/09/20 1504 126 kg (277 lb 12.5 oz)   08/08/20 1850 126 kg (278 lb 10.6 oz)   08/08/20 1703 127 kg (279 lb 15.8 oz)   08/08/20 1702 127 kg (280 lb)       Objective    Objective:  General Appearance:  Comfortable, fairly well-appearing, in no acute distress and not in pain.  Awake, alert, oriented  HEENT: Mucous membranes moist, no injury, oropharynx clear  Lungs:  Normal effort and normal respiratory rate.  Breath sounds clear to auscultation.  No  respiratory distress.  No rales, decreased breath sounds or rhonchi.    Heart: Normal rate.  Regular rhythm.  S1 normal.  No murmur.   Abdomen: Abdomen is soft.  Bowel sounds are normal, no abdominal tenderness.  There is no rebound or guarding  Extremities: Normal range of motion.  No significant edema of bilateral lower extremities, distal pulses intact  Neurological: No focal motor or sensory deficits, pupils reactive  Skin:  Warm and dry.  No rash or cyanosis.       Results Review:    Intake/Output:     Intake/Output Summary (Last 24 hours) at 8/11/2020 1052  Last data filed at 8/10/2020 2130  Gross per 24 hour   Intake 840 ml   Output --   Net 840 ml         DATA:  Radiology and Labs:  The following labs independently reviewed by me. Additional labs  ordered for tomorrow a.m.  Interval notes, chart personally reviewed by me.   Old records independently reviewed showing CKD 3  The following radiologic studies independently viewed by me, findings chest x-ray no acute disease mild vascular congestion  Discussed with patient and family at bedside    Risk/ complexity of medical care/ medical decision making  moderate    Labs:   Recent Results (from the past 24 hour(s))   Renal Function Panel    Collection Time: 08/11/20  4:09 AM   Result Value Ref Range    Glucose 100 (H) 65 - 99 mg/dL    BUN 26 (H) 6 - 20 mg/dL    Creatinine 1.85 (H) 0.76 - 1.27 mg/dL    Sodium 138 136 - 145 mmol/L    Potassium 3.7 3.5 - 5.2 mmol/L    Chloride 107 98 - 107 mmol/L    CO2 23.2 22.0 - 29.0 mmol/L    Calcium 7.9 (L) 8.6 - 10.5 mg/dL    Albumin 2.20 (L) 3.50 - 5.20 g/dL    Phosphorus 3.4 2.5 - 4.5 mg/dL    Anion Gap 7.8 5.0 - 15.0 mmol/L    BUN/Creatinine Ratio 14.1 7.0 - 25.0    eGFR Non African Amer 40 (L) >60 mL/min/1.73   CBC Auto Differential    Collection Time: 08/11/20  4:09 AM   Result Value Ref Range    WBC 7.83 3.40 - 10.80 10*3/mm3    RBC 4.58 4.14 - 5.80 10*6/mm3    Hemoglobin 13.6 13.0 - 17.7 g/dL    Hematocrit 39.7 37.5 - 51.0 %    MCV 86.7 79.0 - 97.0 fL    MCH 29.7 26.6 - 33.0 pg    MCHC 34.3 31.5 - 35.7 g/dL    RDW 13.0 12.3 - 15.4 %    RDW-SD 40.3 37.0 - 54.0 fl    MPV 10.1 6.0 - 12.0 fL    Platelets 189 140 - 450 10*3/mm3    Neutrophil % 70.7 42.7 - 76.0 %    Lymphocyte % 14.9 (L) 19.6 - 45.3 %    Monocyte % 9.2 5.0 - 12.0 %    Eosinophil % 4.2 0.3 - 6.2 %    Basophil % 0.6 0.0 - 1.5 %    Immature Grans % 0.4 0.0 - 0.5 %    Neutrophils, Absolute 5.53 1.70 - 7.00 10*3/mm3    Lymphocytes, Absolute 1.17 0.70 - 3.10 10*3/mm3    Monocytes, Absolute 0.72 0.10 - 0.90 10*3/mm3    Eosinophils, Absolute 0.33 0.00 - 0.40 10*3/mm3    Basophils, Absolute 0.05 0.00 - 0.20 10*3/mm3    Immature Grans, Absolute 0.03 0.00 - 0.05 10*3/mm3    nRBC 0.0 0.0 - 0.2 /100 WBC        Radiology:  Imaging Results (Last 24 Hours)     ** No results found for the last 24 hours. **             Medications have been reviewed:  Current Facility-Administered Medications   Medication Dose Route Frequency Provider Last Rate Last Dose   • acetaminophen (TYLENOL) tablet 650 mg  650 mg Oral Q4H PRN Jason Carvalho MD   650 mg at 08/08/20 2055   • amLODIPine (NORVASC) tablet 10 mg  10 mg Oral Q24H Mono Roberts MD   10 mg at 08/11/20 0659   • asenapine maleate (SAPHRIS) sublingual tablet 5 mg  5 mg Sublingual Nightly Mono Roberts MD   5 mg at 08/10/20 2122   • aspirin EC tablet 325 mg  325 mg Oral Daily Jason Carvalho MD   325 mg at 08/11/20 0659   • atorvastatin (LIPITOR) tablet 80 mg  80 mg Oral Nightly Mono Roberts MD   80 mg at 08/10/20 2122   • Brexpiprazole tablet 3 mg  3 mg Oral Daily Mono Roberts MD   3 mg at 08/11/20 0618   • buPROPion SR (WELLBUTRIN SR) 12 hr tablet 200 mg  200 mg Oral Q12H Mono Roberts MD   200 mg at 08/11/20 0625   • clopidogrel (PLAVIX) tablet 75 mg  75 mg Oral Daily Jason Carvalho MD   75 mg at 08/11/20 0619   • DULoxetine (CYMBALTA) DR capsule 120 mg  120 mg Oral Daily Mono Roberts MD   120 mg at 08/11/20 0618   • famotidine (PEPCID) tablet 20 mg  20 mg Oral BID Mono Roberts MD   20 mg at 08/11/20 0619   • FLUoxetine (PROzac) capsule 100 mg  100 mg Oral Daily Mono Roberts MD   100 mg at 08/11/20 0658   • hydrALAZINE (APRESOLINE) injection 10 mg  10 mg Intravenous Q4H PRN Mono Roberts MD       • hydrALAZINE (APRESOLINE) tablet 25 mg  25 mg Oral Q8H Mono Roberts MD   25 mg at 08/11/20 0619   • lamoTRIgine (LaMICtal) tablet 400 mg  400 mg Oral Daily Mono Roberts MD   400 mg at 08/11/20 0624   • metoprolol tartrate (LOPRESSOR) tablet 25 mg  25 mg Oral Q12H Mono Roberts MD   25 mg at 08/11/20 0619   • sodium chloride 0.9 % flush 10 mL  10 mL Intravenous PRN Jason Carvalho MD           ASSESSMENT:  Acute kidney injury, prerenal,  improved  CKD stage III secondary to diabetes  Proteinuria  Type 2 diabetes mellitus, borderline control  Acute MI  Coronary artery disease status post PTCA  Hypertension, poorly controlled  Hyperlipidemia      Plan:  His renal function continues to improve, creatinine was 1.5 2 years ago so his current level may be his new baseline  will stop IV fluids today  Blood pressure improved, should improve further off IV fluids  Do not see any need for diuretics at this time  Will restart low-dose ARB as renal function has stabilized  Stable for discharge from a renal standpoint.  He can follow-up with me in the office in 2 to 4 weeks with repeat labs      Fabien Lee MD   [facility]   Office phone number: [phone]  Answering service phone number: [phone]    08/11/20  10:52    Dictation performed using Dragon dictation software

## 2020-08-11 NOTE — DISCHARGE SUMMARY
[facility]  Physician Discharge Summary    Patient Identification:  Name: Jai Gayle  Age: 40 y.o.  Sex: male  :  1980  MRN: [id number]    Admit date: 2020    Discharge date and time: 2020 a at 1520Admitting Physician: Jason Carvalho MD     Discharge Physician:  Jason Carvalho MD    Discharge Diagnoses:   Patient Active Problem List   Diagnosis   • Acute MI, inferior wall (CMS/HCC)   • CAD (coronary artery disease)       Discharged Condition: stable    Hospital Course:   Jai Gayle is a 40-year-old male, who is current with our service.  He presented to [facility] with an acute MI.  He had ST elevation inferiorly and was placed on a nitro drip.  At that point he was taken to the Cath Lab to proceed with a cardiac cath.  [facility] and nephrology was consulted.  He is to follow-up with nephrology in 2 weeks.  He is echo revealed aortic valve dimensionless index is 0.8, peak velocity of the flow distal to the aortic valve is 131.0 cm/S, aortic valve maximum pressure gradient is 6.9 mmHg, aortic valve mean pressure gradient is 3.0 mmHg, calculated rv systolic pressure from TV regurgitation is 32 mmHg.  LAC size is borderline dilated, and no evidence of pericardial effusion.   On 2020 he had a cardiac cath with stent placement to proximal right coronary artery.  Antiplatelet and risk for stent thrombosis leading to acute MI which carries high morbidity and mortality rate had been explained to the patient.  Creatinine was approximately 1.52 years ago and  currently his creatinine is 1.85 which may be is new norm per nephrology.  Chest x-ray on admit showed moderate to severe left shoulder osteoarthritis.  There was pulmonary vascular congestion without superimposed pulmonary consolidation no pneumothorax or pleural effusion was seen.  He will continue atorvastatin, amlodipine, aspirin, metoprolol tartrate, Plavix, and losartan.  CV stable  "at discharge.  Follow-up appointments as below.          Consults:   IP CONSULT TO INTERNAL MEDICINE  IP CONSULT TO NEPHROLOGY             Physical Exam  /95   Pulse 69   Temp 98.1 °F (36.7 °C) (Temporal)   Resp 15   Ht 185 cm (72.84\")   Wt 126 kg (276 lb 10.8 oz)   SpO2 98%   BMI 36.67 kg/m²     General appearance: No acute changes   Eyes: Sclera conjunctiva normal, pupils reactive   HENT: Atraumatic; oropharynx clear with moist mucous membranes and no mucosal ulcerations;  Neck: Trachea midline; NECK, supple, no thyromegaly or lymphadenopathy   Lungs: Normal size and shape, normal breath sounds, equal distribution of air, no rales and rhonchi   CV: S1-S2 regular, no murmurs, no rub, no gallop   Abdomen: Soft, non-tender; no masses , no abnormal abdominal sounds   Extremities: No deformity , normal color , no peripheral edema   Skin: Normal temperature, turgor and texture; no rash, ulcers  Psych: Appropriate affect, alert and oriented to person, place and time             LABS:  Results from last 7 days   Lab Units 08/08/20  1715   TROPONIN T ng/mL <0.010              Results from last 7 days   Lab Units 08/11/20  0409 08/10/20  0447 08/09/20  0358   WBC 10*3/mm3 7.83 8.97 12.28*   HEMOGLOBIN g/dL 13.6 13.7 14.5   HEMATOCRIT % 39.7 39.3 41.1   PLATELETS 10*3/mm3 189 192 290     Results from last 7 days   Lab Units 08/08/20  1715   INR  0.91   APTT seconds 26.9     Results from last 7 days   Lab Units 08/10/20  0447   CHOLESTEROL mg/dL 284*   TRIGLYCERIDES mg/dL 353*   HDL CHOL mg/dL 36*   LDL CHOL mg/dL 177*     Disposition:  Home    Discharge Medications:      Discharge Medications      New Medications      Instructions Start Date   amLODIPine 10 MG tablet  Commonly known as:  NORVASC   10 mg, Oral, Every 24 Hours Scheduled   Start Date:  August 12, 2020     aspirin 325 MG EC tablet   325 mg, Oral, Daily   Start Date:  August 12, 2020     atorvastatin 80 MG tablet  Commonly known as:  LIPITOR   80 mg, " Oral, Nightly      clopidogrel 75 MG tablet  Commonly known as:  PLAVIX   75 mg, Oral, Daily   Start Date:  August 12, 2020     famotidine 20 MG tablet  Commonly known as:  PEPCID   20 mg, Oral, 2 Times Daily      hydrALAZINE 25 MG tablet  Commonly known as:  APRESOLINE   25 mg, Oral, Every 8 Hours Scheduled      metoprolol tartrate 25 MG tablet  Commonly known as:  LOPRESSOR   25 mg, Oral, Every 12 Hours Scheduled         Changes to Medications      Instructions Start Date   losartan 25 MG tablet  Commonly known as:  COZAAR  What changed:    · medication strength  · how much to take  · when to take this   25 mg, Oral, Every 24 Hours Scheduled   Start Date:  August 12, 2020        Continue These Medications      Instructions Start Date   asenapine maleate 5 MG sublingual tablet sublingual tablet  Commonly known as:  SAPHRIS   5 mg, Sublingual, Nightly      DULoxetine 60 MG capsule  Commonly known as:  CYMBALTA   120 mg, Oral, Daily      FLUoxetine 20 MG capsule  Commonly known as:  PROzac   100 mg, Oral, Daily      lamoTRIgine 200 MG tablet  Commonly known as:  LaMICtal   400 mg, Oral, Daily      metFORMIN 500 MG tablet  Commonly known as:  GLUCOPHAGE   500 mg, Oral, 2 Times Daily With Meals      Rexulti 3 MG tablet  Generic drug:  Brexpiprazole   3 mg, Oral, Daily      Wellbutrin  MG 12 hr tablet  Generic drug:  buPROPion SR   200 mg, Oral, 2 Times Daily           The following medical decision was discussed in detail with Dr. Carvalho    Discharge Home Instructions:   1. Follow up with PAUL Hassan on August 26, at 11 am.  2.  Follow-up with Dr. Garber in approximately 2 weeks.  Please call his office for an appointment.  3.  Follow-up with your primary care physician in 1 week.  Please call for an appointment.  4.  Please take all medications as prescribed.  5.The importance of taking dual antiplatelets for one year  has been explained, risk of stent thrombosis leading to the acute MI, which  "carries high morbidity and mortality has been explained. Discontinuation or interruptions of these medications should be under the strict guidance of appropriate health professional.  6.  Please take your blood pressure 1 hour and 30 minutes after you take your blood pressure medicine.  Please let us know if you have systolic less than 110 or heart rate less than 60.    Activity Instructions:    Routine post cardiac catheterization/PCI discharge home care instructions:    1. No submerging procedure site below water for 7-10 days.  2. No lifting objects greater than 1 lbs for 3 days.  3. If groin site used, avoid climbing several flights of stairs or sitting for longer than 2 hours at a time for the next 24 hours.   4. Monitor puncture site for bleeding and/or knots;. If bleeding should occur at the groin site: lie flat, apply pressure and return to the ER. If bleeding should occur at the wrist site, apply pressure and return to the ER.  5.  You may apply a DRY Band-Aid over the puncture site if needed. Do not apply any lotions, salves or ointments to site.  6. No driving for 3 days.  7. Return to ER for recurrent symptoms.    8. No smoking.  9. Take all medications as prescribed.      Signed:  PAUL Hassan  8/11/2020  14:39      Patient personally interviewed and above subjective findings personally confirmed during a face to face contact with patient today  All findings of physical examination confirmed  All pertinent and performed labs, cardiac procedures ,  radiographs of the last 24 hours personally reviewed  Impression and plans discussed/elaborated and implemented jointly as described above     Jason Carvalho MD          EMR Dragon/Transcription:   \"Dictated utilizing Dragon dictation\".     "

## 2020-08-11 NOTE — PROGRESS NOTES
"Daily progress note    Chief complaint  Doing better  No new complaints  Wants to go home    History of present illness  40-year white male with history of hypertension and very significant psych history presented to [facility]'s emergency room with right-sided chest discomfort started 15 minutes prior to presentation radiates to right arm but no associated symptoms including shortness of breath nausea vomiting but very diaphoretic.  Patient evaluated in ER found to have acute inferior wall MI taken to Cath Lab and underwent PCI and stent placement and I am asked to follow the patient for medical problem.  Patient has no chest pain at the time of interview.  Patient also denies any fever cough congestion abdominal pain nausea vomiting diarrhea.      REVIEW OF SYSTEMS  Unremarkable    PHYSICAL EXAM  Blood pressure 143/95, pulse 69, temperature 98.1 °F (36.7 °C), temperature source Temporal, resp. rate 15, height 185 cm (72.84\"), weight 126 kg (276 lb 10.8 oz), SpO2 98 %.    Constitutional: He is oriented to person, place, and time. He appears distressed (moderate).  Head: Atraumatic.   Eyes: EOM are normal.   Neck: Normal range of motion. Neck supple. No JVD present. No tracheal deviation present.   Cardiovascular: Normal rate, regular rhythm, normal heart sounds and intact distal pulses.   Pulmonary/Chest: Effort normal and breath sounds normal. No respiratory distress. He has no wheezes. He has no rales.   Abdominal: Soft. Bowel sounds are normal. He exhibits no distension. There is no tenderness. There is no rebound and no guarding.   Musculoskeletal: Normal range of motion. He exhibits no edema or tenderness.   Lymphadenopathy: He has no cervical adenopathy.   Neurological: He is alert and oriented to person, place, and time.   Skin: Skin is warm. He is diaphoretic. No erythema.     LAB RESULTS  Lab Results (last 24 hours)     Procedure Component Value Units Date/Time    POC Activated Clotting Time " [[id number]]  (Abnormal) Collected:  08/08/20 1805    Specimen:  Blood Updated:  08/11/20 0859     Activated Clotting Time  235 Seconds      Comment: Serial Number: [id number]:  [id number]       Renal Function Panel [[id number]]  (Abnormal) Collected:  08/11/20 0409    Specimen:  Blood Updated:  08/11/20 0516     Glucose 100 mg/dL      BUN 26 mg/dL      Creatinine 1.85 mg/dL      Sodium 138 mmol/L      Potassium 3.7 mmol/L      Chloride 107 mmol/L      CO2 23.2 mmol/L      Calcium 7.9 mg/dL      Albumin 2.20 g/dL      Phosphorus 3.4 mg/dL      Anion Gap 7.8 mmol/L      BUN/Creatinine Ratio 14.1     eGFR Non African Amer 40 mL/min/1.73     Narrative:       GFR Normal >60  Chronic Kidney Disease <60  Kidney Failure <15      CBC & Differential [[id number]] Collected:  08/11/20 0409    Specimen:  Blood Updated:  08/11/20 0441    Narrative:       The following orders were created for panel order CBC & Differential.  Procedure                               Abnormality         Status                     ---------                               -----------         ------                     CBC Auto Differential[[id number]]        Abnormal            Final result                 Please view results for these tests on the individual orders.    CBC Auto Differential [[id number]]  (Abnormal) Collected:  08/11/20 0409    Specimen:  Blood Updated:  08/11/20 0441     WBC 7.83 10*3/mm3      RBC 4.58 10*6/mm3      Hemoglobin 13.6 g/dL      Hematocrit 39.7 %      MCV 86.7 fL      MCH 29.7 pg      MCHC 34.3 g/dL      RDW 13.0 %      RDW-SD 40.3 fl      MPV 10.1 fL      Platelets 189 10*3/mm3      Neutrophil % 70.7 %      Lymphocyte % 14.9 %      Monocyte % 9.2 %      Eosinophil % 4.2 %      Basophil % 0.6 %      Immature Grans % 0.4 %      Neutrophils, Absolute 5.53 10*3/mm3      Lymphocytes, Absolute 1.17 10*3/mm3      Monocytes, Absolute 0.72 10*3/mm3      Eosinophils, Absolute 0.33 10*3/mm3      Basophils, Absolute 0.05 10*3/mm3       Immature Grans, Absolute 0.03 10*3/mm3      nRBC 0.0 /100 WBC         Imaging Results (Last 24 Hours)     ** No results found for the last 24 hours. **        ECG 12 Lead        HEART RATE= 81  bpm  RR Interval= 740  ms  FL Interval= 177  ms  P Horizontal Axis= -30  deg  P Front Axis= -7  deg  QRSD Interval= 104  ms  QT Interval= 378  ms  QRS Axis= 38  deg  T Wave Axis= 101  deg  - ABNORMAL ECG -  Sinus rhythm  Inferior infarct, acute             Current Facility-Administered Medications:   •  acetaminophen (TYLENOL) tablet 650 mg, 650 mg, Oral, Q4H PRN, Jason Carvalho MD, 650 mg at 08/08/20 2055  •  amLODIPine (NORVASC) tablet 10 mg, 10 mg, Oral, Q24H, Mono Roberts MD, 10 mg at 08/11/20 0659  •  asenapine maleate (SAPHRIS) sublingual tablet 5 mg, 5 mg, Sublingual, Nightly, Mono Roberts MD, 5 mg at 08/10/20 2122  •  aspirin EC tablet 325 mg, 325 mg, Oral, Daily, Jason Carvalho MD, 325 mg at 08/11/20 0659  •  atorvastatin (LIPITOR) tablet 80 mg, 80 mg, Oral, Nightly, Mono Roberts MD, 80 mg at 08/10/20 2122  •  Brexpiprazole tablet 3 mg, 3 mg, Oral, Daily, Mono Roberts MD, 3 mg at 08/11/20 0618  •  buPROPion SR (WELLBUTRIN SR) 12 hr tablet 200 mg, 200 mg, Oral, Q12H, Mono Roberts MD, 200 mg at 08/11/20 0625  •  clopidogrel (PLAVIX) tablet 75 mg, 75 mg, Oral, Daily, Jason Carvalho MD, 75 mg at 08/11/20 0619  •  DULoxetine (CYMBALTA) DR capsule 120 mg, 120 mg, Oral, Daily, Mono Roberts MD, 120 mg at 08/11/20 0618  •  famotidine (PEPCID) tablet 20 mg, 20 mg, Oral, BID, Mono Roberts MD, 20 mg at 08/11/20 0619  •  FLUoxetine (PROzac) capsule 100 mg, 100 mg, Oral, Daily, Mono Roberts MD, 100 mg at 08/11/20 0658  •  hydrALAZINE (APRESOLINE) injection 10 mg, 10 mg, Intravenous, Q4H PRN, Mono Roberts MD  •  hydrALAZINE (APRESOLINE) tablet 25 mg, 25 mg, Oral, Q8H, Mono Roberts MD, 25 mg at 08/11/20 0619  •  lamoTRIgine (LaMICtal) tablet 400 mg, 400 mg, Oral, Daily, Mono Roberts MD, 400 mg at  08/11/20 0624  •  losartan (COZAAR) tablet 25 mg, 25 mg, Oral, Q24H, Fabien Lee MD  •  metoprolol tartrate (LOPRESSOR) tablet 25 mg, 25 mg, Oral, Q12H, Too Roberts MD, 25 mg at 08/11/20 0619  •  [COMPLETED] Insert peripheral IV, , , Once **AND** sodium chloride 0.9 % flush 10 mL, 10 mL, Intravenous, PRN, Jason Carvalho MD     ASSESSMENT  Acute inferior wall MI status post PCI and stent placement  Coronary artery disease  Hypertension  Hyperlipidemia  Depression  Significant psych history  Acute kidney injury  Chronic kidney disease stage III  Gastroesophageal reflux disease    PLAN  CPM  Aspirin Plavix Lopressor  Discontinue IV fluids  Adjust home medications  Stress ulcer DVT prophylaxis  Supportive care  Discussed with nursing staff and cardiology  Okay to discharge    TOO ROBERTS MD

## 2020-08-12 ENCOUNTER — READMISSION MANAGEMENT (OUTPATIENT)
Dept: CALL CENTER | Facility: HOSPITAL | Age: 40
End: 2020-08-12

## 2020-08-12 NOTE — OUTREACH NOTE
Prep Survey      Responses   [facility] facility patient discharged from?  [place]   Is LACE score < 7 ?  No   Eligibility  Readm Mgmt   Discharge diagnosis  Acute MI, inferior wall, s/p [facility]   COVID-19 Test Status  Negative   Does the patient have one of the following disease processes/diagnoses(primary or secondary)?  Acute MI (STEMI,NSTEMI)   Does the patient have Home health ordered?  No   Is there a DME ordered?  No   Prep survey completed?  Yes          Laura Blank RN

## 2020-08-13 ENCOUNTER — TELEPHONE (OUTPATIENT)
Dept: CARDIAC REHAB | Facility: HOSPITAL | Age: 40
End: 2020-08-13

## 2020-08-13 NOTE — TELEPHONE ENCOUNTER
Called pt back to provide him with contact number of person who could help him secure health insurance.  Pt says he was able to call and talk to someone already and says he was told his health insurance card is being mailed to him. He will call back once he gets that card.

## 2020-08-13 NOTE — TELEPHONE ENCOUNTER
Called pt secondary to having contacted [place] Medicaid which was listed on demographic sheet as pt's insurance.  Told that Medicaid [place] is not in effect unless pt has Medicare Part D.  Representative, Rajwinder, with [place] [employer] says pt has Medicare A&B, but not D. I called pt to clarify.  Pt says he does not have any health insurance.  Other than part D for his medications.  Pt says someone with hospital paid a visit to him when he was here and was helping him enroll in some healthcare. He has not heard back from that person.  Explained without insurance, cost of program would be extremely expensive. Plan is to have pt call us back for appt once he obtains health insurance.

## 2020-08-14 ENCOUNTER — READMISSION MANAGEMENT (OUTPATIENT)
Dept: CALL CENTER | Facility: HOSPITAL | Age: 40
End: 2020-08-14

## 2020-08-14 NOTE — OUTREACH NOTE
AMI Week 1 Survey      Responses   [facility] [place] patient discharged from?  [place]   Does the patient have one of the following disease processes/diagnoses(primary or secondary)?  Acute MI (STEMI,NSTEMI)   Is there a successful TCM telephone encounter documented?  No   Week 1 attempt successful?  Yes   Call start time  0900   Call end time  0913   Discharge diagnosis  Acute MI, inferior wall, s/p [facility]   Is patient permission given to speak with other caregiver?  No   List who call center can speak with  Speak with patient only.   Meds reviewed with patient/caregiver?  Yes   Is the patient having any side effects they believe may be caused by any medication additions or changes?  No   Does the patient have all prescriptions related to this admission filled (includes statins,anticoagulants,HTN meds,anti-arrhythmia meds)  Yes   Is the patient taking all medications as directed (includes completed medication regime)?  Yes   Medication comments  PCP prescribed NTG, instructed in use.   Does the patient have a primary care provider?   Yes   Does the patient have an appointment with their PCP,cardiologist,or clinic within 7 days of discharge?  Yes   Has the patient kept scheduled appointments due by today?  Yes   Comments  PCP appt 08/14/2020   Has home health visited the patient within 72 hours of discharge?  N/A   Psychosocial issues?  No   Did the patient receive a copy of their discharge instructions?  Yes   Nursing interventions  Reviewed instructions with patient   What is the patient's perception of their health status since discharge?  Improving   Nursing interventions  Nurse provided patient education   Is the patient/caregiver able to teach back signs and symptoms of when to call for help immediately:  Sudden chest discomfort, Sudden discomfort in arms, back, neck or jaw, Shortness of breath at any time, Sudden sweating or clammy skin, Nausea or vomiting, Dizziness or lightheadedness, Irregular or rapid  heart rate   Nursing interventions  Nurse provided patient education   Is the pateint /caregiver able to teach back the importance of cardiac rehab?  Yes   Nursing interventions  Provided education on importance of cardiac rehab   Is the patient/caregiver able to teach back lifestyle changes to help prevent MIs  Quit smoking, Maintaining a healthy weight, Heart healthy diet, Regular exercise as approved by provider   Is the patient/caregiver able to teach back ways to prevent a second heart attack:  Take medications, Follow up with MD   If the patient is a current smoker, are they able to teach back resources for cessation?  Smoking cessation medications, Smoking cessation support groups, Smoking cessation classes   Is the patient/caregiver able to teach back the hierarchy of who to call/visit for symptoms/problems? PCP, Specialist, Home health nurse, Urgent Care, ED, 911  Yes   Additional teach back comments  Patient says he is mentally ill, anxiety and depression,  takes medication. Walks daily for stress.   Week 1 call completed?  Yes          Maida Gant RN

## 2020-08-18 ENCOUNTER — TELEPHONE (OUTPATIENT)
Dept: CARDIOLOGY | Facility: CLINIC | Age: 40
End: 2020-08-18

## 2020-08-18 NOTE — TELEPHONE ENCOUNTER
PATIENT IS HAVING SWELLING OF HIS FEET HE THINKS ITS BECAUSE HIS DOSE OF LOSARTIN WAS CHANGED. HE HAS HOSP F/U [facility] FOR 8/25/20 WANTS TO KNOW IF HE NEEDS TO BE SEEN SOONER OR NOT. PLEASE ADVISE

## 2020-08-19 ENCOUNTER — TRANSCRIBE ORDERS (OUTPATIENT)
Dept: ADMINISTRATIVE | Facility: HOSPITAL | Age: 40
End: 2020-08-19

## 2020-08-19 ENCOUNTER — LAB (OUTPATIENT)
Dept: LAB | Facility: HOSPITAL | Age: 40
End: 2020-08-19

## 2020-08-19 DIAGNOSIS — N17.9 ACUTE RENAL FAILURE, UNSPECIFIED ACUTE RENAL FAILURE TYPE (HCC): ICD-10-CM

## 2020-08-19 DIAGNOSIS — N17.9 ACUTE RENAL FAILURE, UNSPECIFIED ACUTE RENAL FAILURE TYPE (HCC): Primary | ICD-10-CM

## 2020-08-19 LAB
ANION GAP SERPL CALCULATED.3IONS-SCNC: 10.1 MMOL/L (ref 5–15)
BACTERIA UR QL AUTO: ABNORMAL /HPF
BASOPHILS # BLD AUTO: 0.07 10*3/MM3 (ref 0–0.2)
BASOPHILS NFR BLD AUTO: 1 % (ref 0–1.5)
BILIRUB UR QL STRIP: NEGATIVE
BUN SERPL-MCNC: 32 MG/DL (ref 6–20)
BUN/CREAT SERPL: 15.5 (ref 7–25)
CALCIUM SPEC-SCNC: 9 MG/DL (ref 8.6–10.5)
CHLORIDE SERPL-SCNC: 114 MMOL/L (ref 98–107)
CLARITY UR: CLEAR
CO2 SERPL-SCNC: 17.9 MMOL/L (ref 22–29)
COLOR UR: YELLOW
CREAT SERPL-MCNC: 2.07 MG/DL (ref 0.76–1.27)
DEPRECATED RDW RBC AUTO: 42.8 FL (ref 37–54)
EOSINOPHIL # BLD AUTO: 0.28 10*3/MM3 (ref 0–0.4)
EOSINOPHIL NFR BLD AUTO: 3.9 % (ref 0.3–6.2)
ERYTHROCYTE [DISTWIDTH] IN BLOOD BY AUTOMATED COUNT: 13.1 % (ref 12.3–15.4)
GFR SERPL CREATININE-BSD FRML MDRD: 36 ML/MIN/1.73
GLUCOSE SERPL-MCNC: 81 MG/DL (ref 65–99)
GLUCOSE UR STRIP-MCNC: NEGATIVE MG/DL
HCT VFR BLD AUTO: 39.9 % (ref 37.5–51)
HGB BLD-MCNC: 13.5 G/DL (ref 13–17.7)
HGB UR QL STRIP.AUTO: ABNORMAL
HYALINE CASTS UR QL AUTO: ABNORMAL /LPF
IMM GRANULOCYTES # BLD AUTO: 0.04 10*3/MM3 (ref 0–0.05)
IMM GRANULOCYTES NFR BLD AUTO: 0.6 % (ref 0–0.5)
KETONES UR QL STRIP: NEGATIVE
LEUKOCYTE ESTERASE UR QL STRIP.AUTO: NEGATIVE
LYMPHOCYTES # BLD AUTO: 0.91 10*3/MM3 (ref 0.7–3.1)
LYMPHOCYTES NFR BLD AUTO: 12.6 % (ref 19.6–45.3)
MCH RBC QN AUTO: 30.2 PG (ref 26.6–33)
MCHC RBC AUTO-ENTMCNC: 33.8 G/DL (ref 31.5–35.7)
MCV RBC AUTO: 89.3 FL (ref 79–97)
MONOCYTES # BLD AUTO: 0.68 10*3/MM3 (ref 0.1–0.9)
MONOCYTES NFR BLD AUTO: 9.4 % (ref 5–12)
NEUTROPHILS NFR BLD AUTO: 5.24 10*3/MM3 (ref 1.7–7)
NEUTROPHILS NFR BLD AUTO: 72.5 % (ref 42.7–76)
NITRITE UR QL STRIP: NEGATIVE
NRBC BLD AUTO-RTO: 0 /100 WBC (ref 0–0.2)
PH UR STRIP.AUTO: 6 [PH] (ref 5–8)
PLATELET # BLD AUTO: 253 10*3/MM3 (ref 140–450)
PMV BLD AUTO: 11.3 FL (ref 6–12)
POTASSIUM SERPL-SCNC: 5.4 MMOL/L (ref 3.5–5.2)
PROT UR QL STRIP: ABNORMAL
RBC # BLD AUTO: 4.47 10*6/MM3 (ref 4.14–5.8)
RBC # UR: ABNORMAL /HPF
REF LAB TEST METHOD: ABNORMAL
SODIUM SERPL-SCNC: 142 MMOL/L (ref 136–145)
SP GR UR STRIP: 1.02 (ref 1–1.03)
SQUAMOUS #/AREA URNS HPF: ABNORMAL /HPF
UROBILINOGEN UR QL STRIP: ABNORMAL
WBC # BLD AUTO: 7.22 10*3/MM3 (ref 3.4–10.8)
WBC UR QL AUTO: ABNORMAL /HPF

## 2020-08-19 PROCEDURE — 80048 BASIC METABOLIC PNL TOTAL CA: CPT | Performed by: INTERNAL MEDICINE

## 2020-08-19 PROCEDURE — 36415 COLL VENOUS BLD VENIPUNCTURE: CPT

## 2020-08-19 PROCEDURE — 81001 URINALYSIS AUTO W/SCOPE: CPT | Performed by: INTERNAL MEDICINE

## 2020-08-19 PROCEDURE — 85025 COMPLETE CBC W/AUTO DIFF WBC: CPT | Performed by: INTERNAL MEDICINE

## 2020-08-21 ENCOUNTER — APPOINTMENT (OUTPATIENT)
Dept: CARDIAC REHAB | Facility: HOSPITAL | Age: 40
End: 2020-08-21

## 2020-08-21 ENCOUNTER — OFFICE VISIT (OUTPATIENT)
Dept: CARDIOLOGY | Facility: CLINIC | Age: 40
End: 2020-08-21

## 2020-08-21 ENCOUNTER — READMISSION MANAGEMENT (OUTPATIENT)
Dept: CALL CENTER | Facility: HOSPITAL | Age: 40
End: 2020-08-21

## 2020-08-21 VITALS
WEIGHT: 282 LBS | HEIGHT: 73 IN | BODY MASS INDEX: 37.37 KG/M2 | DIASTOLIC BLOOD PRESSURE: 81 MMHG | HEART RATE: 75 BPM | SYSTOLIC BLOOD PRESSURE: 141 MMHG

## 2020-08-21 DIAGNOSIS — Z09 HOSPITAL DISCHARGE FOLLOW-UP: ICD-10-CM

## 2020-08-21 DIAGNOSIS — I10 ESSENTIAL HYPERTENSION: ICD-10-CM

## 2020-08-21 DIAGNOSIS — I25.10 CORONARY ARTERY DISEASE INVOLVING NATIVE CORONARY ARTERY OF NATIVE HEART WITHOUT ANGINA PECTORIS: ICD-10-CM

## 2020-08-21 DIAGNOSIS — Z72.0 TOBACCO USE: ICD-10-CM

## 2020-08-21 DIAGNOSIS — E66.01 SEVERE OBESITY (BMI 35.0-39.9) WITH COMORBIDITY (HCC): Primary | ICD-10-CM

## 2020-08-21 DIAGNOSIS — I21.19 ACUTE MI, INFERIOR WALL (HCC): ICD-10-CM

## 2020-08-21 PROCEDURE — 99213 OFFICE O/P EST LOW 20 MIN: CPT | Performed by: NURSE PRACTITIONER

## 2020-08-21 PROCEDURE — 99406 BEHAV CHNG SMOKING 3-10 MIN: CPT | Performed by: NURSE PRACTITIONER

## 2020-08-21 RX ORDER — LOSARTAN POTASSIUM 50 MG/1
50 TABLET ORAL
Qty: 30 TABLET | Refills: 11
Start: 2020-08-21 | End: 2021-12-13

## 2020-08-21 NOTE — PROGRESS NOTES
Subjective:        Jai Gayle is a 40 y.o. male who here for follow up    Chief Complaint   Patient presents with   • Follow-up     hospital follow up       HPI     Jai Gayle is a 40-year-old male, who is current with this provider.  He has a history of CAD and obesity.  He presented to [facility] with an acute MI.  He had ST elevation inferiorly and was placed on a nitro drip.  He was taken to the Cath Lab for cardiac cath.  Echo revealed aortic valve dimensionless index of 0.8, peak velocity of flow distal to the aortic valve is 131.0 cm/S, aortic valve maximum pressure gradient is 6.9 mmHg, aortic valve mean pressure gradient is 3.0 mmHg, calculated RV systolic pressure from TV regurgitation is 32 mmHg.  LAC is borderline dilated, and no evidence of pericardial effusion.  His cardiac cath on 8/8/2020 with stent placement to proximal RCA.  Antiplatelet use and risk for stent thrombosis leading to acute MI which carries a high morbidity and mortality rate had been explained to the patient at discharge.  Nephrology and internal medicine follow patient while in our care.  His creatinine was noted to be 1.85 which may be his new norm per nephrology.  He was started on atorvastatin, amlodipine, aspirin, metoprolol tartrate, Plavix, and losartan.  Stable at discharge.  Denies chest pain, shortness of breath, syncope and near syncope.      The following portions of the patient's history were reviewed and updated as appropriate: allergies, current medications, past family history, past medical history, past social history, past surgical history and problem list.    History reviewed. No pertinent past medical history.      reports that he has been smoking cigarettes. He has been smoking about 1.50 packs per day. He has never used smokeless tobacco. He reports that he does not drink alcohol or use drugs.     History reviewed. No pertinent family history.    ROS     Review of  Systems  Constitutional: No wt loss, fever, fatigue  Gastrointestinal: No nausea, abdominal pain  Behavioral/Psych: No insomnia or anxiety  Cardiovascular: Denies chest pain, shortness of breath.       Objective:           Physical Exam   Constitutional: He is oriented to person, place, and time. He appears well-developed and well-nourished.   HENT:   Head: Normocephalic.   Right Ear: External ear normal.   Left Ear: External ear normal.   Eyes: EOM are normal.   Neck: Normal range of motion. No JVD present.   Cardiovascular: Normal rate, regular rhythm, normal heart sounds and intact distal pulses. Exam reveals no gallop and no friction rub.   No murmur heard.  Pulmonary/Chest: Effort normal and breath sounds normal. No stridor. No respiratory distress. He has no rales.   Abdominal: Soft. Bowel sounds are normal. He exhibits no distension. There is no tenderness. There is no guarding.   Musculoskeletal: Normal range of motion. He exhibits no edema or tenderness.   Neurological: He is alert and oriented to person, place, and time. He has normal reflexes.   Skin: Skin is warm.   Psychiatric: He has a normal mood and affect. Judgment normal.   Nursing note and vitals reviewed.      Procedures     Interpretation Summary     · Aortic valve dimensionless index is 0.8.  · Peak velocity of the flow distal to the aortic valve is 131.0 cm/s.  · Aortic valve maximum pressure gradient is 6.9 mmHg.  · Aortic valve mean pressure gradient is 3.0 mmHg.  · Calculated right ventricular systolic pressure from tricuspid regurgitation is 32 mmHg.  · Left atrial cavity size is borderline dilated.  · Calculated EF = 46.7%  · There is no evidence of pericardial effusion     HEMODYNAMIC / ANGIOGRAPHIC DATA:    1. Left ventricular end diastolic pressure was 10 mmHg.  2. Left ventriculography revealed an EF around 55 %.    3. The left main is normal left main.  4. The left anterior descending artery is *.Left anterior descending shows early  atherosclerotic plaque with the first large diagonal branch at the origin was 80 to 90% stenosis  5. The left circumflex is .Circumflex artery was nondominant with early atherosclerotic plaque  6. The right coronary artery is .Right coronary artery was dominant with a proximal 100% reduced to 0% with 3.5/23 Xience stent dilated to 3.6  7. Successful angioplasty and stent to 100% occluded proximal RCA to 0% with 3.5/23 Xience stent dilated to 3.6     SKYLER FLOW    PRE...0....       POST....3..     TYPE OF LESION......C.......     RECOMMENDATIONS:  Post-procedure care will focus on prevention of any ischemic events and congestive complications.  Aggressive risk factor modification will be carried out.  Importance of taking dual antiplatelets for one year  has been explained, risk of stent thrombosis leading to the acute MI, which carries high morbidity and mortality has been explained     Discontinuation or interruptions of these medications should be under the strict guidance of appropriate health professional           Current Outpatient Medications:   •  amLODIPine (NORVASC) 10 MG tablet, Take 1 tablet by mouth Daily., Disp: 30 tablet, Rfl: 12  •  asenapine maleate (SAPHRIS) 5 MG sublingual tablet sublingual tablet, Place 5 mg under the tongue Every Night., Disp: , Rfl:   •  aspirin  MG EC tablet, Take 1 tablet by mouth Daily., Disp: 30 tablet, Rfl: 12  •  atorvastatin (LIPITOR) 80 MG tablet, Take 1 tablet by mouth Every Night., Disp: 30 tablet, Rfl: 12  •  buPROPion SR (Wellbutrin SR) 200 MG 12 hr tablet, Take 200 mg by mouth 2 (Two) Times a Day., Disp: , Rfl:   •  clopidogrel (PLAVIX) 75 MG tablet, Take 1 tablet by mouth Daily., Disp: 30 tablet, Rfl: 12  •  DULoxetine (CYMBALTA) 60 MG capsule, Take 120 mg by mouth Daily., Disp: , Rfl:   •  famotidine (PEPCID) 20 MG tablet, Take 1 tablet by mouth 2 (Two) Times a Day., Disp: 60 tablet, Rfl: 1  •  FLUoxetine (PROzac) 20 MG capsule, Take 100 mg by mouth Daily.,  Disp: , Rfl:   •  hydrALAZINE (APRESOLINE) 25 MG tablet, Take 1 tablet by mouth Every 8 (Eight) Hours., Disp: 90 tablet, Rfl: 12  •  lamoTRIgine (LaMICtal) 200 MG tablet, Take 400 mg by mouth Daily., Disp: , Rfl:   •  losartan (COZAAR) 25 MG tablet, Take 1 tablet by mouth Daily., Disp: 30 tablet, Rfl: 12  •  metFORMIN (GLUCOPHAGE) 500 MG tablet, Take 500 mg by mouth 2 (Two) Times a Day With Meals., Disp: , Rfl:   •  metoprolol tartrate (LOPRESSOR) 25 MG tablet, Take 1 tablet by mouth Every 12 (Twelve) Hours., Disp: 60 tablet, Rfl: 12     Assessment:        Patient Active Problem List   Diagnosis   • Acute MI, inferior wall (CMS/HCC)   • CAD (coronary artery disease)               Plan:   1.  Hospital follow-up: He arrived to the hospital with acute MI.  Cardiac cath with stent placement to proximal RCA.  Echo as above.  At this time I we will have him do a functional stress test.  He denies any chest pain.    It was explained to the patient that stress testing carries 85% specificity/sensitivity, and does not rule out future cardiac event.  Risks of the procedure were explained to the patient including shortness of breath, induction of myocardial infarction, and dizziness.  Patient is agreeable to proceeding with stress testing.     2.  CAD: As above.  Denies chest pain.  Continue aspirin, atorvastatin, Plavix and metoprolol tartrate.    Risk reduction for the coronary artery disease, controlling the blood pressure, blood sugar management, cholesterol management, exercise, stress management, and proper compliance with medications and follow-up has been discussed    3. Tobacco abuse: 1 1/2 pack a day. He is discussing with his PCP.     Jai Gayle has been explained that tobacco abuse is extremely harmful to the body including to the cardiovascular, it significantly increases the risk of atherosclerosis, myocardial infarction, strokes and peripheral vascular disease. Strongly advised to stop tobacco  abuse  Secondhand smoking also has been explained.     4. BLE swelling: He will get jobst stocking, elevated legs.     Educated on low sodium diet and exercise 30 minutes a day for 2-3 times a week.     5. Hypertension: I will increase his losartan. He will do a blood pressure diary.  Continue amlodipine, hydralazine, metoprolol tartrate, and losartan.     Educated patient on exercising for at least 30 minutes a day for 2 to 3 days a week. Importance of controlling hypertension and blood pressure checkup on the regular basis has been explained. Hypertension as a silent killer has been discussed. Risk reduction of the weight and regular exercises to control the hypertension has been explained.     6. Obese: BMI 37.4    Significant risk of obesity to CAD, HTN has been explained  Advantages of wt reduction has been explained.    7.  Hyperlipidemia: Lipid panel on 8/10/2020 revealed , HDL 36, , and triglycerides 353.  He will need a CMP and lipid profile at his next visit.    Risk of the hyperlipidemia, importance of the treatment has been explained. Pros and cons of the statins has been explained. Regular blood workup as well as side effects including the liver failure, myelopathy death has been explained.              No diagnosis found.    There are no diagnoses linked to this encounter.    COUNSELING: doreen Willson was given to patient for the following topics: diagnostic results, risk factor reductions, impressions, risks and benefits of treatment options and importance of treatment compliance .       SMOKING COUNSELING: as above     Advanced Care Planning:  has NO advanced directive - not interested in additional information    He will do a functional stress test.       It was explained to the patient that stress testing carries 85% specificity/sensitivity, and does not rule out future cardiac event.  Risks of the procedure were explained to the patient including shortness of  breath, induction of myocardial infarction, and dizziness.  Patient is agreeable to proceeding with stress testing.     Sincerely,   PAUL Hassan  [facility]  08/21/20      12:02     EMR Dragon/Transcription disclaimer:   Much of this encounter note is an electronic transcription/translation of spoken language to printed text. The electronic translation of spoken language may permit erroneous, or at times, nonsensical words or phrases to be inadvertently transcribed; Although I have reviewed the note for such errors, some may still exist.

## 2020-08-21 NOTE — OUTREACH NOTE
AMI Week 2 Survey      Responses   [facility] patient discharged from?  [place]   Does the patient have one of the following disease processes/diagnoses(primary or secondary)?  Acute MI (STEMI,NSTEMI)   Week 2 attempt successful?  Yes   Call start time  1051   Call end time  1053   Discharge diagnosis  Acute MI, inferior wall, s/p [facility]   Is the patient taking all medications as directed (includes completed medication regime)?  Yes   Comments regarding appointments  appt with Cardiology today (8/21)   Has the patient kept scheduled appointments due by today?  Yes   Psychosocial issues?  No   What is the patient's perception of their health status since discharge?  Improving   Nursing interventions  Nurse provided patient education   Is the patient/caregiver able to teach back signs and symptoms of when to call for help immediately:  Sudden chest discomfort, Shortness of breath at any time, Nausea or vomiting, Irregular or rapid heart rate, Dizziness or lightheadedness, Sudden discomfort in arms, back, neck or jaw, Sudden sweating or clammy skin   Nursing interventions  Nurse provided patient education   Is the patient/caregiver able to teach back ways to prevent a second heart attack:  Take medications   Is the patient/caregiver able to teach back the hierarchy of who to call/visit for symptoms/problems? PCP, Specialist, Home health nurse, Urgent Care, ED, 911  Yes   Week 2 call completed?  Yes   Wrap up additional comments  Patient says he is doing well, he has an appt with the cardiologist today, no questions or concerns at this time.          Martha Pineda RN

## 2020-08-26 ENCOUNTER — TELEPHONE (OUTPATIENT)
Dept: CARDIAC REHAB | Facility: HOSPITAL | Age: 40
End: 2020-08-26

## 2020-08-26 NOTE — TELEPHONE ENCOUNTER
I just called patient to schedule cardiac rehab.  We had spoken with the patient on 8/13/20 when he was waiting for his insurance card in the mail.  The plan was for him to call us back when he was ready to schedule.    He has declined cardiac rehab at this time.

## 2020-08-28 ENCOUNTER — READMISSION MANAGEMENT (OUTPATIENT)
Dept: CALL CENTER | Facility: HOSPITAL | Age: 40
End: 2020-08-28

## 2020-08-28 NOTE — OUTREACH NOTE
AMI Week 3 Survey      Responses   [facility] patient discharged from?  [place]   Does the patient have one of the following disease processes/diagnoses(primary or secondary)?  Acute MI (STEMI,NSTEMI)   Week 3 attempt successful?  Yes   Call start time  1620   Call end time  1625   Medication alerts for this patient  Reviewed medications   Meds reviewed with patient/caregiver?  Yes   Is the patient taking all medications as directed (includes completed medication regime)?  Yes   Comments regarding appointments  has kept his appointments as schedule   Has the patient kept scheduled appointments due by today?  Yes   What is the patient's perception of their health status since discharge?  Improving   Is the pateint /caregiver able to teach back the importance of cardiac rehab?  Yes [but he is going to do at home , physican has given him instructions for home]   Week 3 call completed?  Yes   Wrap up additional comments  having episodes of vomiting 2-3 times daily          Carlotta Waller RN

## 2020-09-03 ENCOUNTER — HOSPITAL ENCOUNTER (OUTPATIENT)
Dept: CARDIOLOGY | Facility: HOSPITAL | Age: 40
Discharge: HOME | End: 2020-09-03
Admitting: NURSE PRACTITIONER

## 2020-09-03 VITALS — DIASTOLIC BLOOD PRESSURE: 90 MMHG | HEART RATE: 73 BPM | SYSTOLIC BLOOD PRESSURE: 132 MMHG

## 2020-09-03 LAB
BH CV STRESS BP STAGE 1: NORMAL
BH CV STRESS BP STAGE 2: NORMAL
BH CV STRESS DURATION MIN STAGE 1: 3
BH CV STRESS DURATION MIN STAGE 2: 3
BH CV STRESS DURATION MIN STAGE 3: 0
BH CV STRESS DURATION SEC STAGE 1: 0
BH CV STRESS DURATION SEC STAGE 2: 0
BH CV STRESS DURATION SEC STAGE 3: 16
BH CV STRESS GRADE STAGE 1: 10
BH CV STRESS GRADE STAGE 2: 12
BH CV STRESS GRADE STAGE 3: 14
BH CV STRESS HR STAGE 1: 93
BH CV STRESS HR STAGE 2: 106
BH CV STRESS HR STAGE 3: 107
BH CV STRESS METS STAGE 1: 4.6
BH CV STRESS METS STAGE 2: 7.1
BH CV STRESS METS STAGE 3: 7.4
BH CV STRESS PROTOCOL 1: NORMAL
BH CV STRESS RECOVERY BP: NORMAL MMHG
BH CV STRESS RECOVERY HR: 90 BPM
BH CV STRESS SPEED STAGE 1: 1.7
BH CV STRESS SPEED STAGE 2: 2.5
BH CV STRESS SPEED STAGE 3: 3.4
BH CV STRESS STAGE 1: 1
BH CV STRESS STAGE 2: 2
BH CV STRESS STAGE 3: 3
MAXIMAL PREDICTED HEART RATE: 179 BPM
PERCENT MAX PREDICTED HR: 59.78 %
STRESS BASELINE BP: NORMAL MMHG
STRESS BASELINE HR: 74 BPM
STRESS PERCENT HR: 70 %
STRESS POST ESTIMATED WORKLOAD: 7.4 METS
STRESS POST EXERCISE DUR MIN: 6 MIN
STRESS POST EXERCISE DUR SEC: 16 SEC
STRESS POST PEAK BP: NORMAL MMHG
STRESS POST PEAK HR: 107 BPM
STRESS TARGET HR: 152 BPM

## 2020-09-03 PROCEDURE — 93017 CV STRESS TEST TRACING ONLY: CPT

## 2020-09-03 PROCEDURE — 93018 CV STRESS TEST I&R ONLY: CPT | Performed by: INTERNAL MEDICINE

## 2020-09-03 PROCEDURE — 93016 CV STRESS TEST SUPVJ ONLY: CPT | Performed by: INTERNAL MEDICINE

## 2020-10-12 RX ORDER — FAMOTIDINE 20 MG/1
TABLET, FILM COATED ORAL
Qty: 60 TABLET | Refills: 1 | Status: SHIPPED | OUTPATIENT
Start: 2020-10-12 | End: 2020-11-30

## 2020-11-12 ENCOUNTER — OFFICE VISIT (OUTPATIENT)
Dept: CARDIOLOGY | Facility: CLINIC | Age: 40
End: 2020-11-12

## 2020-11-12 VITALS
DIASTOLIC BLOOD PRESSURE: 73 MMHG | BODY MASS INDEX: 32.07 KG/M2 | HEART RATE: 66 BPM | HEIGHT: 73 IN | SYSTOLIC BLOOD PRESSURE: 124 MMHG | WEIGHT: 242 LBS

## 2020-11-12 DIAGNOSIS — I21.19 ACUTE MI, INFERIOR WALL (HCC): Primary | ICD-10-CM

## 2020-11-12 DIAGNOSIS — I25.10 CORONARY ARTERY DISEASE INVOLVING NATIVE CORONARY ARTERY OF NATIVE HEART WITHOUT ANGINA PECTORIS: ICD-10-CM

## 2020-11-12 PROCEDURE — 99213 OFFICE O/P EST LOW 20 MIN: CPT | Performed by: INTERNAL MEDICINE

## 2020-11-12 RX ORDER — ASPIRIN 81 MG/1
91 TABLET, DELAYED RELEASE ORAL DAILY
Qty: 30 TABLET | Refills: 5 | Status: SHIPPED | OUTPATIENT
Start: 2020-11-12

## 2020-11-12 RX ORDER — TAMSULOSIN HYDROCHLORIDE 0.4 MG/1
CAPSULE ORAL
COMMUNITY
Start: 2020-08-25 | End: 2021-05-27

## 2020-11-12 NOTE — PROGRESS NOTES
"2 MO FOLLOW UP WITH TEST RESULTS   Subjective:        Jai Gayle is a 40 y.o. male who here for follow up    CC  Coronary artery disease with recent MI  HPI  40-year-old male with recent myocardial infarction as well as coronary artery disease here for the follow-up has been doing well with no complaints of chest pains tightness heaviness or the pressure sensation     Problems Addressed this Visit        Cardiovascular and Mediastinum    Acute MI, inferior wall (CMS/HCC) - Primary    Relevant Orders    Lipid Panel    Comprehensive Metabolic Panel    CAD (coronary artery disease)    Relevant Orders    Lipid Panel    Comprehensive Metabolic Panel      Diagnoses       Codes Comments    Acute MI, inferior wall (CMS/HCC)    -  Primary ICD-10-CM: I21.19  ICD-9-CM: 410.40     Coronary artery disease involving native coronary artery of native heart without angina pectoris     ICD-10-CM: I25.10  ICD-9-CM: 414.01         .    The following portions of the patient's history were reviewed and updated as appropriate: allergies, current medications, past family history, past medical history, past social history, past surgical history and problem list.    History reviewed. No pertinent past medical history.  reports that he has been smoking cigarettes. He has been smoking about 2.00 packs per day. He has never used smokeless tobacco. He reports that he does not drink alcohol or use drugs.   Family History   Problem Relation Age of Onset   • Hypertension Mother    • Hyperlipidemia Mother    • Heart disease Mother    • Heart attack Mother    • Heart failure Neg Hx        Review of Systems  Constitutional: No wt loss, fever, fatigue  Gastrointestinal: No nausea, abdominal pain  Behavioral/Psych: No insomnia or anxiety   Cardiovascular no chest pains or tightness in the chest  Objective:       Physical Exam  /73   Pulse 66   Ht 185.4 cm (73\")   Wt 110 kg (242 lb)   BMI 31.93 kg/m²   General appearance: No acute changes "   Neck: Trachea midline; NECK, supple, no thyromegaly or lymphadenopathy   Lungs: Normal size and shape, normal breath sounds, equal distribution of air, no rales and rhonchi   CV: S1-S2 regular, no murmurs, no rub, no gallop   Abdomen: Soft, non-tender; no masses , no abnormal abdominal sounds   Extremities: No deformity , normal color , no peripheral edema   Skin: Normal temperature, turgor and texture; no rash, ulcers          Procedures      Echocardiogram:        Current Outpatient Medications:   •  amLODIPine (NORVASC) 10 MG tablet, Take 1 tablet by mouth Daily., Disp: 30 tablet, Rfl: 12  •  asenapine maleate (SAPHRIS) 5 MG sublingual tablet sublingual tablet, Place 10 mg under the tongue Every Night., Disp: , Rfl:   •  aspirin  MG EC tablet, Take 1 tablet by mouth Daily., Disp: 30 tablet, Rfl: 12  •  atorvastatin (LIPITOR) 80 MG tablet, Take 1 tablet by mouth Every Night., Disp: 30 tablet, Rfl: 12  •  buPROPion SR (Wellbutrin SR) 200 MG 12 hr tablet, Take 200 mg by mouth 2 (Two) Times a Day., Disp: , Rfl:   •  clopidogrel (PLAVIX) 75 MG tablet, Take 1 tablet by mouth Daily., Disp: 30 tablet, Rfl: 12  •  DULoxetine (CYMBALTA) 60 MG capsule, Take 120 mg by mouth Daily., Disp: , Rfl:   •  famotidine (PEPCID) 20 MG tablet, TAKE 1 TABLET BY MOUTH TWICE DAILY, Disp: 60 tablet, Rfl: 1  •  FLUoxetine (PROzac) 20 MG capsule, Take 100 mg by mouth Daily., Disp: , Rfl:   •  hydrALAZINE (APRESOLINE) 25 MG tablet, Take 1 tablet by mouth Every 8 (Eight) Hours., Disp: 90 tablet, Rfl: 12  •  lamoTRIgine (LaMICtal) 200 MG tablet, Take 400 mg by mouth Daily., Disp: , Rfl:   •  losartan (COZAAR) 50 MG tablet, Take 1 tablet by mouth Daily., Disp: 30 tablet, Rfl: 11  •  metFORMIN (GLUCOPHAGE) 500 MG tablet, Take 500 mg by mouth 2 (Two) Times a Day With Meals., Disp: , Rfl:   •  metoprolol tartrate (LOPRESSOR) 25 MG tablet, Take 1 tablet by mouth Every 12 (Twelve) Hours., Disp: 60 tablet, Rfl: 12  •  tamsulosin (FLOMAX) 0.4  MG capsule 24 hr capsule, TK ONE C PO  QHS, Disp: , Rfl:    Assessment:        Patient Active Problem List   Diagnosis   • Acute MI, inferior wall (CMS/HCC)   • CAD (coronary artery disease)     Conclusion       · Successful right and left coronary angiogram and LV gram  · Successful angioplasty and stent to 100% occluded proximal RCA to 0% with 3.5/23 Xience stent dilated to 3.6  · Normal left main  · Left anterior descending shows early atherosclerotic plaque with the first large diagonal branch at the origin was 80 to 90% stenosis  · Circumflex artery was nondominant with early atherosclerotic plaque  · Right coronary artery was dominant with a proximal 100% reduced to 0% with 3.5/23 Xience stent dilated to 3.6  · Mild anterior wall hypokinetic estimated ejection fraction 55%               Plan:            ICD-10-CM ICD-9-CM   1. Acute MI, inferior wall (CMS/HCC)  I21.19 410.40   2. Coronary artery disease involving native coronary artery of native heart without angina pectoris  I25.10 414.01     1. Acute MI, inferior wall (CMS/HCC)  No angina pectoris  - Lipid Panel  - Comprehensive Metabolic Panel    2. Coronary artery disease involving native coronary artery of native heart without angina pectoris  No angina pectoris  - Lipid Panel  - Comprehensive Metabolic Panel       Post MI stable    Check flp. May mando to lower lipitor      COUNSELING:    Jai Willson was given to patient for the following topics: diagnostic results, risk factor reductions, impressions, risks and benefits of treatment options and importance of treatment compliance .       SMOKING COUNSELING:    [unfilled]    Dictated using Dragon dictation

## 2020-11-13 ENCOUNTER — HOSPITAL ENCOUNTER (OUTPATIENT)
Dept: CARDIOLOGY | Facility: HOSPITAL | Age: 40
Discharge: HOME | End: 2020-11-13
Admitting: INTERNAL MEDICINE

## 2020-11-13 LAB
ALBUMIN SERPL-MCNC: 3.4 G/DL (ref 3.5–5.2)
ALBUMIN/GLOB SERPL: 1.5 G/DL
ALP SERPL-CCNC: 73 U/L (ref 39–117)
ALT SERPL W P-5'-P-CCNC: 16 U/L (ref 1–41)
ANION GAP SERPL CALCULATED.3IONS-SCNC: 8.9 MMOL/L (ref 5–15)
AST SERPL-CCNC: 29 U/L (ref 1–40)
BILIRUB SERPL-MCNC: 0.2 MG/DL (ref 0–1.2)
BUN SERPL-MCNC: 51 MG/DL (ref 6–20)
BUN/CREAT SERPL: 15.9 (ref 7–25)
CALCIUM SPEC-SCNC: 8.9 MG/DL (ref 8.6–10.5)
CHLORIDE SERPL-SCNC: 108 MMOL/L (ref 98–107)
CHOLEST SERPL-MCNC: 120 MG/DL (ref 0–200)
CO2 SERPL-SCNC: 17.1 MMOL/L (ref 22–29)
CREAT SERPL-MCNC: 3.21 MG/DL (ref 0.76–1.27)
GFR SERPL CREATININE-BSD FRML MDRD: 21 ML/MIN/1.73
GLOBULIN UR ELPH-MCNC: 2.2 GM/DL
GLUCOSE SERPL-MCNC: 79 MG/DL (ref 65–99)
HDLC SERPL-MCNC: 36 MG/DL (ref 40–60)
LDLC SERPL CALC-MCNC: 65 MG/DL (ref 0–100)
LDLC/HDLC SERPL: 1.78 {RATIO}
POTASSIUM SERPL-SCNC: 5.4 MMOL/L (ref 3.5–5.2)
PROT SERPL-MCNC: 5.6 G/DL (ref 6–8.5)
SODIUM SERPL-SCNC: 134 MMOL/L (ref 136–145)
TRIGL SERPL-MCNC: 99 MG/DL (ref 0–150)
VLDLC SERPL-MCNC: 19 MG/DL (ref 5–40)

## 2020-11-13 PROCEDURE — 80053 COMPREHEN METABOLIC PANEL: CPT | Performed by: INTERNAL MEDICINE

## 2020-11-13 PROCEDURE — 80061 LIPID PANEL: CPT | Performed by: INTERNAL MEDICINE

## 2020-11-13 PROCEDURE — 36415 COLL VENOUS BLD VENIPUNCTURE: CPT | Performed by: INTERNAL MEDICINE

## 2020-11-30 RX ORDER — FAMOTIDINE 20 MG/1
TABLET, FILM COATED ORAL
Qty: 60 TABLET | Refills: 1 | Status: SHIPPED | OUTPATIENT
Start: 2020-11-30 | End: 2021-02-26

## 2020-12-01 ENCOUNTER — TRANSCRIBE ORDERS (OUTPATIENT)
Dept: ADMINISTRATIVE | Facility: HOSPITAL | Age: 40
End: 2020-12-01

## 2020-12-01 ENCOUNTER — LAB (OUTPATIENT)
Dept: LAB | Facility: HOSPITAL | Age: 40
End: 2020-12-01

## 2020-12-01 DIAGNOSIS — N18.30 MALIGNANT HYPERTENSIVE HEART AND CHRONIC KIDNEY DISEASE STAGE III (HCC): ICD-10-CM

## 2020-12-01 DIAGNOSIS — I13.10 MALIGNANT HYPERTENSIVE HEART AND CHRONIC KIDNEY DISEASE STAGE III (HCC): Primary | ICD-10-CM

## 2020-12-01 DIAGNOSIS — I13.10 MALIGNANT HYPERTENSIVE HEART AND CHRONIC KIDNEY DISEASE STAGE III (HCC): ICD-10-CM

## 2020-12-01 DIAGNOSIS — N18.30 MALIGNANT HYPERTENSIVE HEART AND CHRONIC KIDNEY DISEASE STAGE III (HCC): Primary | ICD-10-CM

## 2020-12-01 LAB
ANION GAP SERPL CALCULATED.3IONS-SCNC: 7.6 MMOL/L (ref 5–15)
BUN SERPL-MCNC: 34 MG/DL (ref 6–20)
BUN/CREAT SERPL: 13.7 (ref 7–25)
CALCIUM SPEC-SCNC: 9 MG/DL (ref 8.6–10.5)
CHLORIDE SERPL-SCNC: 110 MMOL/L (ref 98–107)
CO2 SERPL-SCNC: 21.4 MMOL/L (ref 22–29)
CREAT SERPL-MCNC: 2.48 MG/DL (ref 0.76–1.27)
CREAT UR-MCNC: 62.8 MG/DL
GFR SERPL CREATININE-BSD FRML MDRD: 29 ML/MIN/1.73
GLUCOSE SERPL-MCNC: 99 MG/DL (ref 65–99)
POTASSIUM SERPL-SCNC: 4.7 MMOL/L (ref 3.5–5.2)
PROT UR-MCNC: 426 MG/DL
PROT/CREAT UR: 6783.4 MG/G CREA (ref 0–200)
SODIUM SERPL-SCNC: 139 MMOL/L (ref 136–145)

## 2020-12-01 PROCEDURE — 80048 BASIC METABOLIC PNL TOTAL CA: CPT

## 2020-12-01 PROCEDURE — 84156 ASSAY OF PROTEIN URINE: CPT

## 2020-12-01 PROCEDURE — 82570 ASSAY OF URINE CREATININE: CPT

## 2020-12-01 PROCEDURE — 36415 COLL VENOUS BLD VENIPUNCTURE: CPT

## 2020-12-03 ENCOUNTER — OFFICE VISIT (OUTPATIENT)
Dept: CARDIOLOGY | Facility: CLINIC | Age: 40
End: 2020-12-03

## 2020-12-03 VITALS — HEIGHT: 73 IN | BODY MASS INDEX: 30.09 KG/M2 | WEIGHT: 227 LBS

## 2020-12-03 DIAGNOSIS — E78.5 HYPERLIPIDEMIA, UNSPECIFIED HYPERLIPIDEMIA TYPE: ICD-10-CM

## 2020-12-03 DIAGNOSIS — I10 ESSENTIAL HYPERTENSION: ICD-10-CM

## 2020-12-03 DIAGNOSIS — Z79.02 LONG TERM (CURRENT) USE OF ANTITHROMBOTICS/ANTIPLATELETS: ICD-10-CM

## 2020-12-03 DIAGNOSIS — I25.10 CORONARY ARTERY DISEASE INVOLVING NATIVE CORONARY ARTERY OF NATIVE HEART WITHOUT ANGINA PECTORIS: ICD-10-CM

## 2020-12-03 DIAGNOSIS — I21.19 ACUTE MI, INFERIOR WALL (HCC): Primary | ICD-10-CM

## 2020-12-03 PROCEDURE — 99442 PR PHYS/QHP TELEPHONE EVALUATION 11-20 MIN: CPT | Performed by: INTERNAL MEDICINE

## 2020-12-03 NOTE — PROGRESS NOTES
You have chosen to receive care through a telephone visit. Do you consent to use a telephone visit for your medical care today? Yes    Stress and Echo Results.    Subjective:        Jai Gayle is a 40 y.o. male who here for follow up    CC  Tele for coronary artery disease  HPI  40-year-old male with known history of the coronary artery disease with a previous myocardial infarction as well as benign essential arterial hypertension    Patient denies any chest pains tightness heaviness or the pressure sensation     Problems Addressed this Visit        Cardiovascular and Mediastinum    Acute MI, inferior wall (CMS/HCC) - Primary    CAD (coronary artery disease)    Essential hypertension    Hyperlipidemia       Other    Long term (current) use of antithrombotics/antiplatelets      Diagnoses       Codes Comments    Acute MI, inferior wall (CMS/HCC)    -  Primary ICD-10-CM: I21.19  ICD-9-CM: 410.40     Coronary artery disease involving native coronary artery of native heart without angina pectoris     ICD-10-CM: I25.10  ICD-9-CM: 414.01     Essential hypertension     ICD-10-CM: I10  ICD-9-CM: 401.9     Hyperlipidemia, unspecified hyperlipidemia type     ICD-10-CM: E78.5  ICD-9-CM: 272.4     Long term (current) use of antithrombotics/antiplatelets     ICD-10-CM: Z79.02  ICD-9-CM: V58.63         .    The following portions of the patient's history were reviewed and updated as appropriate: allergies, current medications, past family history, past medical history, past social history, past surgical history and problem list.    History reviewed. No pertinent past medical history.  reports that he has been smoking cigarettes. He has been smoking about 2.00 packs per day. He has never used smokeless tobacco. He reports that he does not drink alcohol or use drugs.   Family History   Problem Relation Age of Onset   • Hypertension Mother    • Hyperlipidemia Mother    • Heart disease Mother    • Heart attack Mother    • Heart  failure Neg Hx        Review of Systems  Constitutional: No wt loss, fever, fatigue  Gastrointestinal: No nausea, abdominal pain  Behavioral/Psych: No insomnia or anxiety   Cardiovascular no chest pains or tightness in the chest  Objective:       Physical Exam  tele    Procedures      Echocardiogram:        Current Outpatient Medications:   •  amLODIPine (NORVASC) 10 MG tablet, Take 1 tablet by mouth Daily., Disp: 30 tablet, Rfl: 12  •  asenapine maleate (SAPHRIS) 5 MG sublingual tablet sublingual tablet, Place 10 mg under the tongue Every Night., Disp: , Rfl:   •  aspirin 81 MG EC tablet, Take 1 tablet by mouth Daily., Disp: 30 tablet, Rfl: 5  •  atorvastatin (LIPITOR) 80 MG tablet, Take 1 tablet by mouth Every Night., Disp: 30 tablet, Rfl: 12  •  buPROPion SR (Wellbutrin SR) 200 MG 12 hr tablet, Take 200 mg by mouth 2 (Two) Times a Day., Disp: , Rfl:   •  clopidogrel (PLAVIX) 75 MG tablet, Take 1 tablet by mouth Daily., Disp: 30 tablet, Rfl: 12  •  DULoxetine (CYMBALTA) 60 MG capsule, Take 120 mg by mouth Daily., Disp: , Rfl:   •  famotidine (PEPCID) 20 MG tablet, TAKE 1 TABLET BY MOUTH TWICE DAILY, Disp: 60 tablet, Rfl: 1  •  FLUoxetine (PROzac) 20 MG capsule, Take 100 mg by mouth Daily., Disp: , Rfl:   •  hydrALAZINE (APRESOLINE) 25 MG tablet, Take 1 tablet by mouth Every 8 (Eight) Hours., Disp: 90 tablet, Rfl: 12  •  lamoTRIgine (LaMICtal) 200 MG tablet, Take 400 mg by mouth Daily., Disp: , Rfl:   •  losartan (COZAAR) 50 MG tablet, Take 1 tablet by mouth Daily., Disp: 30 tablet, Rfl: 11  •  metFORMIN (GLUCOPHAGE) 500 MG tablet, Take 500 mg by mouth 2 (Two) Times a Day With Meals., Disp: , Rfl:   •  metoprolol tartrate (LOPRESSOR) 25 MG tablet, Take 1 tablet by mouth Every 12 (Twelve) Hours., Disp: 60 tablet, Rfl: 12  •  tamsulosin (FLOMAX) 0.4 MG capsule 24 hr capsule, TK ONE C PO  QHS, Disp: , Rfl:    Assessment:        Patient Active Problem List   Diagnosis   • Acute MI, inferior wall (CMS/HCC)   • CAD  (coronary artery disease)   Interpretation Summary    · The patient reported dyspnea and shortness of breath during the stress test.  · Equivocal ECG evidence of myocardial ischemia  · .Indeterminate clinical evidence of myocardial ischemia.  · Findings consistent with an equivocal ECG stress test. Asymptomatic for chest pain       Interpretation Summary    · Aortic valve dimensionless index is 0.8.  · Peak velocity of the flow distal to the aortic valve is 131.0 cm/s.  · Aortic valve maximum pressure gradient is 6.9 mmHg.  · Aortic valve mean pressure gradient is 3.0 mmHg.  · Calculated right ventricular systolic pressure from tricuspid regurgitation is 32 mmHg.  · Left atrial cavity size is borderline dilated.  · Calculated EF = 46.7%  · There is no evidence of pericardial effusion.     Total Cholesterol   0 - 200 mg/dL 120    Triglycerides   0 - 150 mg/dL 99    HDL Cholesterol   40 - 60 mg/dL 36Low     LDL Cholesterol    0 - 100 mg/dL 65    VLDL Cholesterol   5 - 40 mg/dL 19    LDL/HDL Ratio  1.78      Component   Ref Range & Units 2wk ago 3mo ago   Glucose   65 - 99 mg/dL 79  81    BUN   6 - 20 mg/dL 51High   32High     Creatinine   0.76 - 1.27 mg/dL 3.21High   2.07High     Sodium   136 - 145 mmol/L 134Low   142    Potassium   3.5 - 5.2 mmol/L 5.4High   5.4High     Chloride   98 - 107 mmol/L 108High   114High     CO2   22.0 - 29.0 mmol/L 17.1Low   17.9Low     Calcium   8.6 - 10.5 mg/dL 8.9  9.0    Total Protein   6.0 - 8.5 g/dL 5.6Low      Albumin   3.50 - 5.20 g/dL 3.40Low      ALT (SGPT)   1 - 41 U/L 16                 Plan:            ICD-10-CM ICD-9-CM   1. Acute MI, inferior wall (CMS/HCC)  I21.19 410.40   2. Coronary artery disease involving native coronary artery of native heart without angina pectoris  I25.10 414.01   3. Essential hypertension  I10 401.9   4. Hyperlipidemia, unspecified hyperlipidemia type  E78.5 272.4   5. Long term (current) use of antithrombotics/antiplatelets  Z79.02 V58.63     1.  Acute MI, inferior wall (CMS/HCC)  Recent MI with angioplasty    2. Coronary artery disease involving native coronary artery of native heart without angina pectoris  No angina pectoris    3. Essential hypertension  Blood pressure under control    4. Hyperlipidemia, unspecified hyperlipidemia type  Continue current treatment    5. Long term (current) use of antithrombotics/antiplatelets  Counseling has been done       Specificity and sensitivity of the stress test/ cardiac workup has been explained. Pt has been explained if  Symptoms continue please go to ER, and further w/p will be required.    Also explained this does not rule out coronary artery disease or the future events, continue to emphasize on risk reductions for coronary artery disease    Pt also advised to contact PCP for other causes of symptoms    6 months    This patient has consented to a telehealth visit via telephone. The visit was scheduled as a phone visit to comply with patient safety concerns in accordance with CDC recommendations.  All vitals recorded within this visit are reported by the patient.  I spent 15 minutes in total including but not limited to the 15 minutes spent in direct conversation with this patient.     COUNSELING:    Jai Willson was given to patient for the following topics: diagnostic results, risk factor reductions, impressions, risks and benefits of treatment options and importance of treatment compliance .       SMOKING COUNSELING:    [unfilled]    Dictated using Dragon dictation

## 2020-12-04 PROBLEM — I10 ESSENTIAL HYPERTENSION: Status: ACTIVE | Noted: 2020-12-04

## 2020-12-04 PROBLEM — E78.5 HYPERLIPIDEMIA: Status: ACTIVE | Noted: 2020-12-04

## 2020-12-04 PROBLEM — Z79.02 LONG TERM (CURRENT) USE OF ANTITHROMBOTICS/ANTIPLATELETS: Status: ACTIVE | Noted: 2020-12-04

## 2021-02-26 RX ORDER — FAMOTIDINE 20 MG/1
TABLET, FILM COATED ORAL
Qty: 60 TABLET | Refills: 1 | Status: SHIPPED | OUTPATIENT
Start: 2021-02-26 | End: 2021-04-20 | Stop reason: SDUPTHER

## 2021-03-31 ENCOUNTER — BULK ORDERING (OUTPATIENT)
Dept: CASE MANAGEMENT | Facility: OTHER | Age: 41
End: 2021-03-31

## 2021-03-31 DIAGNOSIS — Z23 IMMUNIZATION DUE: ICD-10-CM

## 2021-04-01 ENCOUNTER — LAB (OUTPATIENT)
Dept: LAB | Facility: HOSPITAL | Age: 41
End: 2021-04-01

## 2021-04-01 ENCOUNTER — TRANSCRIBE ORDERS (OUTPATIENT)
Dept: ADMINISTRATIVE | Facility: HOSPITAL | Age: 41
End: 2021-04-01

## 2021-04-01 DIAGNOSIS — N18.30 MALIGNANT HYPERTENSIVE HEART AND CHRONIC KIDNEY DISEASE STAGE III (HCC): Primary | ICD-10-CM

## 2021-04-01 DIAGNOSIS — I13.10 MALIGNANT HYPERTENSIVE HEART AND CHRONIC KIDNEY DISEASE STAGE III (HCC): Primary | ICD-10-CM

## 2021-04-01 DIAGNOSIS — I13.10 MALIGNANT HYPERTENSIVE HEART AND CHRONIC KIDNEY DISEASE STAGE III (HCC): ICD-10-CM

## 2021-04-01 DIAGNOSIS — N18.30 MALIGNANT HYPERTENSIVE HEART AND CHRONIC KIDNEY DISEASE STAGE III (HCC): ICD-10-CM

## 2021-04-01 LAB
ANION GAP SERPL CALCULATED.3IONS-SCNC: 9.7 MMOL/L (ref 5–15)
BUN SERPL-MCNC: 61 MG/DL (ref 6–20)
BUN/CREAT SERPL: 19.7 (ref 7–25)
CALCIUM SPEC-SCNC: 7.9 MG/DL (ref 8.6–10.5)
CHLORIDE SERPL-SCNC: 107 MMOL/L (ref 98–107)
CO2 SERPL-SCNC: 22.3 MMOL/L (ref 22–29)
CREAT SERPL-MCNC: 3.1 MG/DL (ref 0.76–1.27)
CREAT UR-MCNC: 49.9 MG/DL
GFR SERPL CREATININE-BSD FRML MDRD: 22 ML/MIN/1.73
GLUCOSE SERPL-MCNC: 108 MG/DL (ref 65–99)
POTASSIUM SERPL-SCNC: 4.4 MMOL/L (ref 3.5–5.2)
PROT UR-MCNC: 311 MG/DL
PROT/CREAT UR: 6232.5 MG/G CREA (ref 0–200)
SODIUM SERPL-SCNC: 139 MMOL/L (ref 136–145)

## 2021-04-01 PROCEDURE — 84156 ASSAY OF PROTEIN URINE: CPT

## 2021-04-01 PROCEDURE — 82570 ASSAY OF URINE CREATININE: CPT

## 2021-04-01 PROCEDURE — 36415 COLL VENOUS BLD VENIPUNCTURE: CPT

## 2021-04-01 PROCEDURE — 80048 BASIC METABOLIC PNL TOTAL CA: CPT

## 2021-04-21 RX ORDER — AMLODIPINE BESYLATE 10 MG/1
10 TABLET ORAL
Qty: 90 TABLET | Refills: 0 | Status: SHIPPED | OUTPATIENT
Start: 2021-04-21 | End: 2021-07-14

## 2021-04-21 RX ORDER — FAMOTIDINE 20 MG/1
20 TABLET, FILM COATED ORAL 2 TIMES DAILY
Qty: 180 TABLET | Refills: 0 | Status: SHIPPED | OUTPATIENT
Start: 2021-04-21 | End: 2021-07-13

## 2021-05-04 RX ORDER — CLOPIDOGREL BISULFATE 75 MG/1
75 TABLET ORAL DAILY
Qty: 30 TABLET | Refills: 3 | Status: SHIPPED | OUTPATIENT
Start: 2021-05-04 | End: 2021-08-11

## 2021-05-04 RX ORDER — ATORVASTATIN CALCIUM 80 MG/1
80 TABLET, FILM COATED ORAL NIGHTLY
Qty: 30 TABLET | Refills: 3 | Status: SHIPPED | OUTPATIENT
Start: 2021-05-04 | End: 2021-07-30

## 2021-05-19 ENCOUNTER — TELEPHONE (OUTPATIENT)
Dept: CARDIOLOGY | Facility: CLINIC | Age: 41
End: 2021-05-19

## 2021-05-19 NOTE — TELEPHONE ENCOUNTER
CALLED TO GET PERMISSION FOR PT TO STOP ALL BLOOD THINNING MEDICATION PRIOR TO UPCOMING BIOPSY.   PT IS DUE IN OFFICE 6/4/21 FOR APPOINTMENT.  PLEASE CALL [phone] FAX NUMBER [phone]    Alert and oriented to person, place and time, memory intact, behavior appropriate to situation, PERRL.

## 2021-05-20 NOTE — TELEPHONE ENCOUNTER
S/w Jessy at DR. Schaffer office she states it is Kidney Biopsy not scheduled yet. Ok to discuss at 6/14 office visit.   Jessy will call me back if needed.

## 2021-05-27 ENCOUNTER — OFFICE VISIT (OUTPATIENT)
Dept: SURGERY | Facility: CLINIC | Age: 41
End: 2021-05-27

## 2021-05-27 VITALS — HEIGHT: 73 IN | WEIGHT: 227.8 LBS | BODY MASS INDEX: 30.19 KG/M2

## 2021-05-27 DIAGNOSIS — L02.216 ABSCESS, UMBILICAL: Primary | ICD-10-CM

## 2021-05-27 PROCEDURE — 87186 SC STD MICRODIL/AGAR DIL: CPT | Performed by: SURGERY

## 2021-05-27 PROCEDURE — 87205 SMEAR GRAM STAIN: CPT | Performed by: SURGERY

## 2021-05-27 PROCEDURE — 87070 CULTURE OTHR SPECIMN AEROBIC: CPT | Performed by: SURGERY

## 2021-05-27 PROCEDURE — 88304 TISSUE EXAM BY PATHOLOGIST: CPT | Performed by: SURGERY

## 2021-05-27 PROCEDURE — 87147 CULTURE TYPE IMMUNOLOGIC: CPT | Performed by: SURGERY

## 2021-05-27 PROCEDURE — 10061 I&D ABSCESS COMP/MULTIPLE: CPT | Performed by: SURGERY

## 2021-05-27 PROCEDURE — 88312 SPECIAL STAINS GROUP 1: CPT | Performed by: SURGERY

## 2021-05-27 RX ORDER — SULFAMETHOXAZOLE AND TRIMETHOPRIM 800; 160 MG/1; MG/1
1 TABLET ORAL 2 TIMES DAILY
Qty: 10 TABLET | Refills: 0 | Status: SHIPPED | OUTPATIENT
Start: 2021-05-27 | End: 2021-06-01

## 2021-05-27 NOTE — PROGRESS NOTES
Office Consultation    No ref. provider found    Chief Complaint   Patient presents with   • Abscess     Umbilical       HPI:       Jai Gayle is a 40 y.o. male who presents for evaluation of a subcutaneous nodule located over the abdomen.  This has been present for 3 days.  There has been discharge, redness, swelling and pain.  Patient does not have a history of epidermal inclusion cysts but reports history of several skin infections in the past.  Denies any fevers or chills.  Denies trauma to the area or exposure to insect or spider bites    Past Medical History:   Diagnosis Date   • Anxiety    • Diabetes (CMS/HCC)    • Heart disease    • Hyperlipidemia    • Hypertension        Past Surgical History:   Procedure Laterality Date   • CARDIAC CATHETERIZATION N/A 8/8/2020    Procedure: Left Heart Cath;  Surgeon: Jason Carvalho MD;  [facility];  Service: Cardiovascular;  Laterality: N/A;   • CARDIAC CATHETERIZATION N/A 8/8/2020    Procedure: Coronary angiography;  Surgeon: Jason Carvalho MD;  [facility];  Service: Cardiovascular;  Laterality: N/A;   • CARDIAC CATHETERIZATION N/A 8/8/2020    Procedure: Left ventriculography;  Surgeon: Jason Carvalho MD;  [facility] LOC[facility];  Service: Cardiovascular;  Laterality: N/A;   • CARDIAC CATHETERIZATION N/A 8/8/2020    Procedure: Stent MEE coronary;  Surgeon: Jason Carvalho MD;  Location: [facility] LOC[facility];  Service: Cardiovascular;  Laterality: N/A;       Current Outpatient Medications on File Prior to Visit   Medication Sig Dispense Refill   • amLODIPine (NORVASC) 10 MG tablet Take 1 tablet by mouth Daily. 90 tablet 0   • asenapine maleate (SAPHRIS) 5 MG sublingual tablet sublingual tablet Place 10 mg under the tongue Every Night.     • aspirin 81 MG EC tablet Take 1 tablet by mouth Daily. 30 tablet 5   • atorvastatin (LIPITOR) 80 MG tablet Take 1  tablet by mouth Every Night. 30 tablet 3   • buPROPion SR (Wellbutrin SR) 200 MG 12 hr tablet Take 200 mg by mouth 2 (Two) Times a Day.     • cephalexin (Keflex) 500 MG capsule Take 1 capsule by mouth 2 (Two) Times a Day for 10 days. 20 capsule 0   • clopidogrel (PLAVIX) 75 MG tablet Take 1 tablet by mouth Daily. 30 tablet 3   • DULoxetine (CYMBALTA) 60 MG capsule Take 120 mg by mouth Daily.     • famotidine (PEPCID) 20 MG tablet Take 1 tablet by mouth 2 (Two) Times a Day. 180 tablet 0   • FLUoxetine (PROzac) 20 MG capsule Take 100 mg by mouth Daily.     • hydrALAZINE (APRESOLINE) 25 MG tablet Take 1 tablet by mouth Every 8 (Eight) Hours. 90 tablet 12   • lamoTRIgine (LaMICtal) 200 MG tablet Take 400 mg by mouth Daily.     • losartan (COZAAR) 50 MG tablet Take 1 tablet by mouth Daily. 30 tablet 11   • metFORMIN (GLUCOPHAGE) 500 MG tablet Take 500 mg by mouth 2 (Two) Times a Day With Meals.     • metoprolol tartrate (LOPRESSOR) 25 MG tablet Take 1 tablet by mouth Every 12 (Twelve) Hours. 60 tablet 3   • mupirocin (BACTROBAN) 2 % ointment Apply  topically to the appropriate area as directed 3 (Three) Times a Day for 10 days. 1 g 0   • [DISCONTINUED] tamsulosin (FLOMAX) 0.4 MG capsule 24 hr capsule TK ONE C PO  QHS       No current facility-administered medications on file prior to visit.       No Known Allergies    Social History     Socioeconomic History   • Marital status: Single     Spouse name: Not on file   • Number of children: Not on file   • Years of education: Not on file   • Highest education level: Not on file   Tobacco Use   • Smoking status: Current Every Day Smoker     Packs/day: 2.00     Types: Cigarettes   • Smokeless tobacco: Never Used   Vaping Use   • Vaping Use: Never used   Substance and Sexual Activity   • Alcohol use: No   • Drug use: No   • Sexual activity: Defer       Family History   Problem Relation Age of Onset   • Hypertension Mother    • Hyperlipidemia Mother    • Heart disease Mother   "  • Heart attack Mother    • Diabetes Mother    • Heart failure Neg Hx        REVIEW OF SYSTEMS    CONSTITUTIONAL: Denies fevers, chills, unintentional weight loss or weight gain  RESPIRATORY: Denies chronic cough or sob.   CARDIAC: Denies chest pain, palpitations, edema  GI: Denies dyspepsia, reflux, heartburn, nausea, vomiting, diarrhea or constipation : Denies dysuria or hematuria.    MUSCULOSKELETAL: Denies muscle weakness and pain   NEURO: Denies chronic headaches.   ENDOCRINE: Denies significant heat or cold intolerance or history of thyroid problems.    DERM: no rashes,lesions or discharge.     Physical Examination  Ht 185.4 cm (72.99\")   Wt 103 kg (227 lb 12.8 oz)   BMI 30.06 kg/m²   Body mass index is 30.06 kg/m².  GENERAL:alert, well appearing, and in no distress and oriented to person, place, and time  HEENT: normochephalic, atraumatic  NECK: Supple t  CHEST: Breathing comfortable    ABDOMEN: Soft, nontender nondistended, with the left upper umbilical area there is an area of soft tissue swelling with small opening with purulent drainage.  There is no fluctuation.  There is tenderness over the area  NEURO: alert and oriented, normal speech, cranial nerves 2-12 grossly intact, no focal deficits       Assessment:     The patient is a very pleasant 40 y.o. male with abdominal wall abscess.  Discussed with patient about further step.  Recommend that he undergoes incision and drainage of abdominal wall abscess.  Risk and benefits including bleeding, wound complications were discussed in detail with the patient that verbalized understanding and agree with the plan       Plan:          1.  Incision and drainage of abdominal wall abscess  2.  Encourage smoking cessation     Discussed risks of surgery with patient and in particular increased risk of wound infection, poor wound healing, hernias (with abdominal surgery) and post-operative pulmonary complications associated with smoking. "       Procedure    PREOPERATIVE DIAGNOSES:    (1) abdominal wall abscess abscess    POSTOPERATIVE DIAGNOSES:    (1) Same    PROCEDURE:    (1) Incisional drainage of abdominal wall abscess    SURGEON/STAFF:  AMADOR Regan   SPECIMEN:  Culture swap  INTRAOPERATIVE COMPLICATIONS:  None.   Andesthesia: Local      OPERATION:  At the procedure room and after informed consent was signed the patient abdominal wall was prepped and draped in sterile fashion.     Next the skin overlying the abscess was injected with 2% lidocaine with epinephrine.  An elliptical incision of 2 x 2 cm was made with scalpel and dissection continued down to the subcutaneous tissue. Small amount of purulent fluid was drained. This was swapped and sent for culture. Loculation were broken bluntly with hemostat and the abscess completely drained.  Necrotic tissue was sent for pathological analysis.  The cavity was irrigated with saline and packed with quarter inch iodoform gauze. The wound was covered with 4x4 gauze and tape.       The patient tolerated the procedure well    -Patient was instructed to return to my office in 1 week  -Remove the packing tomorrow and clean the wound with soap and water at least twice a day and cover it with gauze  -Start Bactrim double strength for 5 days, stop taking Keflex  -Patient understood    Vamsi Regan MD  General, Minimally Invasive and Endoscopic Surgery  [facility]    [address], [address]  [place], [place], [address]  P: [phone]  F: [phone]

## 2021-05-29 LAB
BACTERIA SPEC AEROBE CULT: ABNORMAL
GRAM STN SPEC: ABNORMAL
GRAM STN SPEC: ABNORMAL

## 2021-06-01 LAB
CYTO UR: NORMAL
LAB AP CASE REPORT: NORMAL
LAB AP DIAGNOSIS COMMENT: NORMAL
PATH REPORT.FINAL DX SPEC: NORMAL
PATH REPORT.GROSS SPEC: NORMAL

## 2021-06-04 ENCOUNTER — OFFICE VISIT (OUTPATIENT)
Dept: SURGERY | Facility: CLINIC | Age: 41
End: 2021-06-04

## 2021-06-04 DIAGNOSIS — Z51.89 VISIT FOR WOUND CHECK: ICD-10-CM

## 2021-06-04 DIAGNOSIS — Z09 POSTOP CHECK: Primary | ICD-10-CM

## 2021-06-04 PROCEDURE — 99024 POSTOP FOLLOW-UP VISIT: CPT | Performed by: SURGERY

## 2021-06-08 RX ORDER — SULFAMETHOXAZOLE AND TRIMETHOPRIM 800; 160 MG/1; MG/1
1 TABLET ORAL 2 TIMES DAILY
Qty: 10 TABLET | Refills: 0 | OUTPATIENT
Start: 2021-06-08 | End: 2021-06-13

## 2021-06-08 NOTE — TELEPHONE ENCOUNTER
Patient is requesting Bactrim refill. I tried to call him and see if he is having new symptoms (had umbilical abscess drained 5/17) but he has not called me back.

## 2021-06-15 ENCOUNTER — OFFICE VISIT (OUTPATIENT)
Dept: CARDIOLOGY | Facility: CLINIC | Age: 41
End: 2021-06-15

## 2021-06-15 VITALS
HEIGHT: 73 IN | BODY MASS INDEX: 30.09 KG/M2 | DIASTOLIC BLOOD PRESSURE: 84 MMHG | HEART RATE: 61 BPM | SYSTOLIC BLOOD PRESSURE: 151 MMHG | WEIGHT: 227 LBS

## 2021-06-15 DIAGNOSIS — I10 ESSENTIAL HYPERTENSION: ICD-10-CM

## 2021-06-15 DIAGNOSIS — Z72.0 TOBACCO USE: ICD-10-CM

## 2021-06-15 DIAGNOSIS — Z79.02 LONG TERM (CURRENT) USE OF ANTITHROMBOTICS/ANTIPLATELETS: ICD-10-CM

## 2021-06-15 DIAGNOSIS — Z01.818 PRE-OP EXAM: Primary | ICD-10-CM

## 2021-06-15 DIAGNOSIS — I25.10 CORONARY ARTERY DISEASE INVOLVING NATIVE CORONARY ARTERY OF NATIVE HEART WITHOUT ANGINA PECTORIS: ICD-10-CM

## 2021-06-15 DIAGNOSIS — E78.5 HYPERLIPIDEMIA, UNSPECIFIED HYPERLIPIDEMIA TYPE: ICD-10-CM

## 2021-06-15 PROCEDURE — 99214 OFFICE O/P EST MOD 30 MIN: CPT | Performed by: NURSE PRACTITIONER

## 2021-06-15 RX ORDER — BREXPIPRAZOLE 3 MG/1
1 TABLET ORAL DAILY
COMMUNITY
Start: 2021-05-29 | End: 2021-10-31 | Stop reason: SDUPTHER

## 2021-06-15 NOTE — PROGRESS NOTES
Subjective:        Jai Gayle is a 40 y.o. male who here for follow up    No chief complaint on file.    CAD for cardiac clearance    HPI     Jai Gayle is a 40-year-old male, who is current with me.  He has a history to include CAD and obesity.  He has a history of CAD and obesity.  He is here today for 6 months follow-up for CAD.  Most recent cardiac cath on 8/8/2020 revealed LAD shows early atherosclerotic plaque with the first large diagonal branch at the origin was 80 to 90% stenosis, successful angioplasty and stent 200% occluded proximal RCA to 0%.  Normal left main.  Circumflex artery was nondominant with early atherosclerotic plaque.  RCA was dominant with approximately 100% reduced to 0%.  Mild anterior wall hypokinetic estimated ejection fraction 5%.  Echo on 8/8/2020 revealed EF 46.7%, aortic valve dimensionless index of 0.8, peak velocity of the flow distal to the aortic valve is 131.0 cm S.  Aortic valve maximum pressure gradient is 6.9 mmHg.  Aortic valve mean pressure gradient is 3 mmHg.  Calculated RV systolic pressure from TV regurgitation is 32 mmHg.  LAC size is borderline dilated.  No evidence of pericardial effusion.       The following portions of the patient's history were reviewed and updated as appropriate: allergies, current medications, past family history, past medical history, past social history, past surgical history and problem list.    Past Medical History:   Diagnosis Date   • Anxiety    • Diabetes (CMS/[facility])    • Heart disease    • Hyperlipidemia    • Hypertension          reports that he has been smoking cigarettes. He has been smoking about 2.00 packs per day. He has never used smokeless tobacco. He reports that he does not drink alcohol and does not use drugs.     Family History   Problem Relation Age of Onset   • Hypertension Mother    • Hyperlipidemia Mother    • Heart disease Mother    • Heart attack Mother    • Diabetes Mother    • Heart failure Neg Hx         ROS     Review of Systems  Constitutional: no wt loss, fever, fatigue  Gastrointestinal: No nausea, abdominal pain  Behavioral/Psych: No insomnia or anxiety  Cardiovascular: Denies chest pain, shortness of breath.       Objective:           Physical Exam  Vitals and nursing note reviewed.   Constitutional:       Appearance: He is well-developed.   HENT:      Head: Normocephalic.      Right Ear: External ear normal.      Left Ear: External ear normal.   Neck:      Vascular: No JVD.   Cardiovascular:      Rate and Rhythm: Normal rate and regular rhythm.      Pulses: Intact distal pulses.      Heart sounds: Normal heart sounds. No murmur heard.   No friction rub. No gallop.    Pulmonary:      Effort: Pulmonary effort is normal. No respiratory distress.      Breath sounds: Normal breath sounds. No stridor. No rales.   Abdominal:      General: Bowel sounds are normal. There is no distension.      Palpations: Abdomen is soft.      Tenderness: There is no abdominal tenderness. There is no guarding.   Musculoskeletal:         General: No tenderness. Normal range of motion.      Cervical back: Normal range of motion.   Skin:     General: Skin is warm.   Neurological:      Mental Status: He is alert and oriented to person, place, and time.      Deep Tendon Reflexes: Reflexes are normal and symmetric.   Psychiatric:         Judgment: Judgment normal.         Procedures     Interpretation Summary     · Aortic valve dimensionless index is 0.8.  · Peak velocity of the flow distal to the aortic valve is 131.0 cm/s.  · Aortic valve maximum pressure gradient is 6.9 mmHg.  · Aortic valve mean pressure gradient is 3.0 mmHg.  · Calculated right ventricular systolic pressure from tricuspid regurgitation is 32 mmHg.  · Left atrial cavity size is borderline dilated.  · Calculated EF = 46.7%  · There is no evidence of pericardial effusion     HEMODYNAMIC / ANGIOGRAPHIC DATA:    1. Left ventricular end diastolic pressure was 10  mmHg.  2. Left ventriculography revealed an EF around 55 %.    3. The left main is normal left main.  4. The left anterior descending artery is *.Left anterior descending shows early atherosclerotic plaque with the first large diagonal branch at the origin was 80 to 90% stenosis  5. The left circumflex is .Circumflex artery was nondominant with early atherosclerotic plaque  6. The right coronary artery is .Right coronary artery was dominant with a proximal 100% reduced to 0% with 3.5/23 Xience stent dilated to 3.6  7. Successful angioplasty and stent to 100% occluded proximal RCA to 0% with 3.5/23 Xience stent dilated to 3.6     SKYLER FLOW    PRE...0....       POST....3..     TYPE OF LESION......C.......     RECOMMENDATIONS:  Post-procedure care will focus on prevention of any ischemic events and congestive complications.  Aggressive risk factor modification will be carried out.  Importance of taking dual antiplatelets for one year  has been explained, risk of stent thrombosis leading to the acute MI, which carries high morbidity and mortality has been explained     Discontinuation or interruptions of these medications should be under the strict guidance of appropriate health professional           Current Outpatient Medications:   •  amLODIPine (NORVASC) 10 MG tablet, Take 1 tablet by mouth Daily., Disp: 90 tablet, Rfl: 0  •  asenapine maleate (SAPHRIS) 5 MG sublingual tablet sublingual tablet, Place 10 mg under the tongue Every Night., Disp: , Rfl:   •  aspirin 81 MG EC tablet, Take 1 tablet by mouth Daily., Disp: 30 tablet, Rfl: 5  •  atorvastatin (LIPITOR) 80 MG tablet, Take 1 tablet by mouth Every Night., Disp: 30 tablet, Rfl: 3  •  buPROPion SR (Wellbutrin SR) 200 MG 12 hr tablet, Take 200 mg by mouth 2 (Two) Times a Day., Disp: , Rfl:   •  clopidogrel (PLAVIX) 75 MG tablet, Take 1 tablet by mouth Daily., Disp: 30 tablet, Rfl: 3  •  DULoxetine (CYMBALTA) 60 MG capsule, Take 120 mg by mouth Daily., Disp: , Rfl:    •  famotidine (PEPCID) 20 MG tablet, Take 1 tablet by mouth 2 (Two) Times a Day., Disp: 180 tablet, Rfl: 0  •  FLUoxetine (PROzac) 20 MG capsule, Take 100 mg by mouth Daily., Disp: , Rfl:   •  hydrALAZINE (APRESOLINE) 25 MG tablet, Take 1 tablet by mouth Every 8 (Eight) Hours., Disp: 90 tablet, Rfl: 12  •  lamoTRIgine (LaMICtal) 200 MG tablet, Take 400 mg by mouth Daily., Disp: , Rfl:   •  losartan (COZAAR) 50 MG tablet, Take 1 tablet by mouth Daily., Disp: 30 tablet, Rfl: 11  •  metFORMIN (GLUCOPHAGE) 500 MG tablet, Take 500 mg by mouth 2 (Two) Times a Day With Meals., Disp: , Rfl:   •  metoprolol tartrate (LOPRESSOR) 25 MG tablet, Take 1 tablet by mouth Every 12 (Twelve) Hours., Disp: 60 tablet, Rfl: 3     Assessment:        Patient Active Problem List   Diagnosis   • Acute MI, inferior wall (CMS/HCC)   • CAD (coronary artery disease)   • Essential hypertension   • Hyperlipidemia   • Long term (current) use of antithrombotics/antiplatelets               Plan:   1.  CAD with history of stent: He will need cardiac clearance. He states he has been doing well.  Encouraged exercise.  He denies any chest pain.  Continue metoprolol tartrate, atorvastatin, hydralazine, losartan, clopidogrel, amlodipine, and aspirin.       It was explained to the patient that stress testing carries 85% specificity/sensitivity, and does not rule out future cardiac event.  Risks of the procedure were explained to the patient including shortness of breath, induction of myocardial infarction, and dizziness.  Patient is agreeable to proceeding with stress testing.     Risk reduction for the coronary artery disease, controlling the blood pressure, blood sugar management, cholesterol management, exercise, stress management, and proper compliance with medications and follow-up has been discussed       3.  Hyperlipidemia: Previous lipid panel 11/13/2020 revealed , HDL 36, LDL 65, and triglycerides 99.  Improved from prior lipid panel.  He  states his primary care physician manages his cholesterol.    Risk of the hyperlipidemia, importance of the treatment has been explained. Pros and cons of the statins has been explained. Regular blood workup as well as side effects including the liver failure, myelopathy death has been explained.    3.  Hypertension: Today in the office his blood pressure is slightly elevated. He will monitor. He states his sys is 130's at home. Controlled on current medications Continue current regimen.      Educated patient on exercising for at least 30 minutes a day for 2 to 3 days a week. Importance of controlling hypertension and blood pressure checkup on the regular basis has been explained. Hypertension as a silent killer has been discussed. Risk reduction of the weight and regular exercises to control the hypertension has been explained.    4.  Tobacco abuse. He smokes a pack and a half a day.    has been explained that tobacco abuse is extremely harmful to the body including to the cardiovascular, it significantly increases the risk of atherosclerosis, myocardial infarction, strokes and peripheral vascular disease. Strongly advised to stop tobacco abuse. Secondhand smoking also has been explained.       5.  Obesity: BMI 29.96    Counseled on need to work toward healthy BMI, diet and exercise modifications and strategies that may work for him.  Health consequences of high BMI discussed. Low fat, lower carb diet reviewed.  I recommended 30 minutes of aerobic exercise 5 times a week.   I recommended healthy diet with lower unhealthy fats, higher healthy fats and lower in carbs and higher in protein with adequate hydration          No diagnosis found.    There are no diagnoses linked to this encounter.    COUNSELING: doreen Willson was given to patient for the following topics: diagnostic results, risk factor reductions, impressions, risks and benefits of treatment options and importance of treatment  compliance .       SMOKING COUNSELING: as above    He will have a treadmill stress test and an echo. .    Sincerely,   PAUL Hassan  [facility]  06/15/21      12:35 EDT     EMR Dragon/Transcription disclaimer:   Much of this encounter note is an electronic transcription/translation of spoken language to printed text. The electronic translation of spoken language may permit erroneous, or at times, nonsensical words or phrases to be inadvertently transcribed; Although I have reviewed the note for such errors, some may still exist.

## 2021-06-18 ENCOUNTER — OFFICE VISIT (OUTPATIENT)
Dept: SURGERY | Facility: CLINIC | Age: 41
End: 2021-06-18

## 2021-06-18 ENCOUNTER — LAB (OUTPATIENT)
Dept: LAB | Facility: HOSPITAL | Age: 41
End: 2021-06-18

## 2021-06-18 DIAGNOSIS — A49.02 MRSA INFECTION: ICD-10-CM

## 2021-06-18 DIAGNOSIS — A49.02 MRSA INFECTION: Primary | ICD-10-CM

## 2021-06-18 PROCEDURE — 99212 OFFICE O/P EST SF 10 MIN: CPT | Performed by: SURGERY

## 2021-06-18 PROCEDURE — 87081 CULTURE SCREEN ONLY: CPT

## 2021-06-18 NOTE — PROGRESS NOTES
Cc: Abdominal wall abscess follow-up    S: Patient is here today for follow-up after undergoing incision and drainage of abdominal wall abscess. Patient is doing great and denies any complaints. No drainage    O:  Alert oriented x3, no acute distress  Abdomen soft, nontender nondistended  Incision completely healed, no evidence of infection    Assessment and plan    40-year-old male status post incision and drainage of abdominal wall abscess that was infected secondary to MRSA. Discussed with the patient about the need to send MRSA swab from the nurse and see if he is colonized with MRSA. If positive then he will need to have decolonization. Otherwise there is no restrictions     Patient to follow-up in my office as needed. We will call him with the result from MRSA nasal swab

## 2021-06-19 LAB — MRSA SPEC QL CULT: NORMAL

## 2021-06-21 ENCOUNTER — TELEPHONE (OUTPATIENT)
Dept: SURGERY | Facility: CLINIC | Age: 41
End: 2021-06-21

## 2021-06-21 NOTE — TELEPHONE ENCOUNTER
I spoke with the patient and informed him of his negative MRSA culture and no need for further treatment per Dr. Regan. Patient verbalized understanding.

## 2021-06-21 NOTE — TELEPHONE ENCOUNTER
----- Message from Vamsi Regan MD sent at 6/21/2021  1:11 PM EDT -----  Please call the patient regarding his result. Mrsa negative, no need for further treatment

## 2021-07-13 RX ORDER — FAMOTIDINE 20 MG/1
TABLET, FILM COATED ORAL
Qty: 60 TABLET | Refills: 0 | Status: SHIPPED | OUTPATIENT
Start: 2021-07-13 | End: 2021-12-13

## 2021-07-14 RX ORDER — AMLODIPINE BESYLATE 10 MG/1
TABLET ORAL
Qty: 90 TABLET | Refills: 1 | Status: SHIPPED | OUTPATIENT
Start: 2021-07-14 | End: 2021-07-27 | Stop reason: SDUPTHER

## 2021-07-27 RX ORDER — AMLODIPINE BESYLATE 10 MG/1
10 TABLET ORAL DAILY
Qty: 90 TABLET | Refills: 1 | Status: SHIPPED | OUTPATIENT
Start: 2021-07-27 | End: 2022-01-18

## 2021-07-30 RX ORDER — ATORVASTATIN CALCIUM 80 MG/1
TABLET, FILM COATED ORAL
Qty: 90 TABLET | Refills: 1 | Status: SHIPPED | OUTPATIENT
Start: 2021-07-30 | End: 2021-10-31 | Stop reason: SDUPTHER

## 2021-08-11 RX ORDER — CLOPIDOGREL BISULFATE 75 MG/1
TABLET ORAL
Qty: 90 TABLET | Refills: 1 | Status: SHIPPED | OUTPATIENT
Start: 2021-08-11 | End: 2022-02-08

## 2021-08-12 ENCOUNTER — HOSPITAL ENCOUNTER (OUTPATIENT)
Dept: CARDIOLOGY | Facility: HOSPITAL | Age: 41
Discharge: HOME | End: 2021-08-12

## 2021-08-12 VITALS — HEART RATE: 68 BPM | SYSTOLIC BLOOD PRESSURE: 158 MMHG | DIASTOLIC BLOOD PRESSURE: 90 MMHG

## 2021-08-12 PROCEDURE — 93018 CV STRESS TEST I&R ONLY: CPT | Performed by: INTERNAL MEDICINE

## 2021-08-12 PROCEDURE — 93306 TTE W/DOPPLER COMPLETE: CPT | Performed by: INTERNAL MEDICINE

## 2021-08-12 PROCEDURE — 93017 CV STRESS TEST TRACING ONLY: CPT

## 2021-08-12 PROCEDURE — 93306 TTE W/DOPPLER COMPLETE: CPT

## 2021-08-13 LAB
AORTIC ARCH: 3 CM
AORTIC DIMENSIONLESS INDEX: 0.9 (DI)
BH CV ECHO MEAS - ACS: 2.4 CM
BH CV ECHO MEAS - AO ARCH DIAM (PROXIMAL TRANS.): 3 CM
BH CV ECHO MEAS - AO MAX PG (FULL): 0.84 MMHG
BH CV ECHO MEAS - AO MAX PG: 7.3 MMHG
BH CV ECHO MEAS - AO MEAN PG (FULL): 0.12 MMHG
BH CV ECHO MEAS - AO MEAN PG: 3.3 MMHG
BH CV ECHO MEAS - AO ROOT AREA (BSA CORRECTED): 1.6
BH CV ECHO MEAS - AO ROOT AREA: 10.4 CM^2
BH CV ECHO MEAS - AO ROOT DIAM: 3.6 CM
BH CV ECHO MEAS - AO V2 MAX: 135.1 CM/SEC
BH CV ECHO MEAS - AO V2 MEAN: 84.1 CM/SEC
BH CV ECHO MEAS - AO V2 VTI: 26 CM
BH CV ECHO MEAS - AVA(I,A): 4.2 CM^2
BH CV ECHO MEAS - AVA(I,D): 4.2 CM^2
BH CV ECHO MEAS - AVA(V,A): 4.7 CM^2
BH CV ECHO MEAS - AVA(V,D): 4.7 CM^2
BH CV ECHO MEAS - BSA(HAYCOCK): 2.3 M^2
BH CV ECHO MEAS - BSA: 2.2 M^2
BH CV ECHO MEAS - BZI_BMI: 30.8 KILOGRAMS/M^2
BH CV ECHO MEAS - BZI_METRIC_HEIGHT: 182.9 CM
BH CV ECHO MEAS - BZI_METRIC_WEIGHT: 103 KG
BH CV ECHO MEAS - EDV(CUBED): 102.3 ML
BH CV ECHO MEAS - EDV(MOD-SP2): 150 ML
BH CV ECHO MEAS - EDV(MOD-SP4): 152 ML
BH CV ECHO MEAS - EDV(TEICH): 101.2 ML
BH CV ECHO MEAS - EF(CUBED): 67 %
BH CV ECHO MEAS - EF(MOD-BP): 59.2 %
BH CV ECHO MEAS - EF(MOD-SP2): 58.7 %
BH CV ECHO MEAS - EF(MOD-SP4): 57.9 %
BH CV ECHO MEAS - EF(TEICH): 58.5 %
BH CV ECHO MEAS - ESV(CUBED): 33.8 ML
BH CV ECHO MEAS - ESV(MOD-SP2): 62 ML
BH CV ECHO MEAS - ESV(MOD-SP4): 64 ML
BH CV ECHO MEAS - ESV(TEICH): 42 ML
BH CV ECHO MEAS - FS: 30.9 %
BH CV ECHO MEAS - IVS/LVPW: 0.9
BH CV ECHO MEAS - IVSD: 1.2 CM
BH CV ECHO MEAS - LAT PEAK E' VEL: 12.6 CM/SEC
BH CV ECHO MEAS - LV DIASTOLIC VOL/BSA (35-75): 67.6 ML/M^2
BH CV ECHO MEAS - LV MASS(C)D: 224.6 GRAMS
BH CV ECHO MEAS - LV MASS(C)DI: 99.9 GRAMS/M^2
BH CV ECHO MEAS - LV MAX PG: 6.5 MMHG
BH CV ECHO MEAS - LV MEAN PG: 3.2 MMHG
BH CV ECHO MEAS - LV SYSTOLIC VOL/BSA (12-30): 28.5 ML/M^2
BH CV ECHO MEAS - LV V1 MAX: 127.1 CM/SEC
BH CV ECHO MEAS - LV V1 MEAN: 81 CM/SEC
BH CV ECHO MEAS - LV V1 VTI: 22 CM
BH CV ECHO MEAS - LVIDD: 4.7 CM
BH CV ECHO MEAS - LVIDS: 3.2 CM
BH CV ECHO MEAS - LVLD AP2: 9.3 CM
BH CV ECHO MEAS - LVLD AP4: 8.9 CM
BH CV ECHO MEAS - LVLS AP2: 7.4 CM
BH CV ECHO MEAS - LVLS AP4: 7.5 CM
BH CV ECHO MEAS - LVOT AREA (M): 4.9 CM^2
BH CV ECHO MEAS - LVOT AREA: 5 CM^2
BH CV ECHO MEAS - LVOT DIAM: 2.5 CM
BH CV ECHO MEAS - LVPWD: 1.3 CM
BH CV ECHO MEAS - MED PEAK E' VEL: 8.5 CM/SEC
BH CV ECHO MEAS - MV A DUR: 0.12 SEC
BH CV ECHO MEAS - MV A MAX VEL: 72.3 CM/SEC
BH CV ECHO MEAS - MV DEC SLOPE: 445.9 CM/SEC^2
BH CV ECHO MEAS - MV DEC TIME: 243 SEC
BH CV ECHO MEAS - MV E MAX VEL: 72.3 CM/SEC
BH CV ECHO MEAS - MV E/A: 1
BH CV ECHO MEAS - MV MAX PG: 3.1 MMHG
BH CV ECHO MEAS - MV MEAN PG: 1.5 MMHG
BH CV ECHO MEAS - MV P1/2T MAX VEL: 107.4 CM/SEC
BH CV ECHO MEAS - MV P1/2T: 70.5 MSEC
BH CV ECHO MEAS - MV V2 MAX: 88.1 CM/SEC
BH CV ECHO MEAS - MV V2 MEAN: 55.4 CM/SEC
BH CV ECHO MEAS - MV V2 VTI: 27.1 CM
BH CV ECHO MEAS - MVA P1/2T LCG: 2 CM^2
BH CV ECHO MEAS - MVA(P1/2T): 3.1 CM^2
BH CV ECHO MEAS - MVA(VTI): 4 CM^2
BH CV ECHO MEAS - PA ACC TIME: 0.12 SEC
BH CV ECHO MEAS - PA MAX PG (FULL): 1.6 MMHG
BH CV ECHO MEAS - PA MAX PG: 5.9 MMHG
BH CV ECHO MEAS - PA PR(ACCEL): 26.7 MMHG
BH CV ECHO MEAS - PA V2 MAX: 121.9 CM/SEC
BH CV ECHO MEAS - PULM A REVS DUR: 0.13 SEC
BH CV ECHO MEAS - PULM A REVS VEL: 31.5 CM/SEC
BH CV ECHO MEAS - PULM DIAS VEL: 40.3 CM/SEC
BH CV ECHO MEAS - PULM S/D: 1.2
BH CV ECHO MEAS - PULM SYS VEL: 46.5 CM/SEC
BH CV ECHO MEAS - RAP SYSTOLE: 3 MMHG
BH CV ECHO MEAS - RV MAX PG: 4.3 MMHG
BH CV ECHO MEAS - RV MEAN PG: 2.2 MMHG
BH CV ECHO MEAS - RV V1 MAX: 103.8 CM/SEC
BH CV ECHO MEAS - RV V1 MEAN: 70.3 CM/SEC
BH CV ECHO MEAS - RV V1 VTI: 19.7 CM
BH CV ECHO MEAS - RVSP: 31 MMHG
BH CV ECHO MEAS - SI(AO): 120.5 ML/M^2
BH CV ECHO MEAS - SI(CUBED): 30.5 ML/M^2
BH CV ECHO MEAS - SI(LVOT): 48.7 ML/M^2
BH CV ECHO MEAS - SI(MOD-SP2): 39.1 ML/M^2
BH CV ECHO MEAS - SI(MOD-SP4): 39.1 ML/M^2
BH CV ECHO MEAS - SI(TEICH): 26.3 ML/M^2
BH CV ECHO MEAS - SV(AO): 270.8 ML
BH CV ECHO MEAS - SV(CUBED): 68.5 ML
BH CV ECHO MEAS - SV(LVOT): 109.4 ML
BH CV ECHO MEAS - SV(MOD-SP2): 88 ML
BH CV ECHO MEAS - SV(MOD-SP4): 88 ML
BH CV ECHO MEAS - SV(TEICH): 59.2 ML
BH CV ECHO MEAS - TAPSE (>1.6): 2.2 CM
BH CV ECHO MEAS - TR MAX PG: 28 MMHG
BH CV ECHO MEAS - TR MAX VEL: 264 CM/SEC
BH CV ECHO MEASUREMENTS AVERAGE E/E' RATIO: 6.85
BH CV STRESS BP STAGE 1: NORMAL
BH CV STRESS BP STAGE 2: NORMAL
BH CV STRESS BP STAGE 3: NORMAL
BH CV STRESS DURATION MIN STAGE 1: 3
BH CV STRESS DURATION MIN STAGE 2: 3
BH CV STRESS DURATION MIN STAGE 3: 2
BH CV STRESS DURATION SEC STAGE 1: 0
BH CV STRESS DURATION SEC STAGE 2: 0
BH CV STRESS DURATION SEC STAGE 3: 39
BH CV STRESS GRADE STAGE 1: 10
BH CV STRESS GRADE STAGE 2: 12
BH CV STRESS GRADE STAGE 3: 14
BH CV STRESS HR STAGE 1: 90
BH CV STRESS HR STAGE 2: 104
BH CV STRESS HR STAGE 3: 117
BH CV STRESS METS STAGE 1: 4.6
BH CV STRESS METS STAGE 2: 7.1
BH CV STRESS METS STAGE 3: 9.6
BH CV STRESS PROTOCOL 1: NORMAL
BH CV STRESS RECOVERY BP: NORMAL MMHG
BH CV STRESS RECOVERY HR: 88 BPM
BH CV STRESS SPEED STAGE 1: 1.7
BH CV STRESS SPEED STAGE 2: 2.5
BH CV STRESS SPEED STAGE 3: 3.4
BH CV STRESS STAGE 1: 1
BH CV STRESS STAGE 2: 2
BH CV STRESS STAGE 3: 3
BH CV XLRA - RV BASE: 3.7 CM
BH CV XLRA - RV LENGTH: 6.9 CM
BH CV XLRA - RV MID: 3.5 CM
BH CV XLRA - TDI S': 12.5 CM/SEC
MAXIMAL PREDICTED HEART RATE: 178 BPM
MAXIMAL PREDICTED HEART RATE: 178 BPM
PERCENT MAX PREDICTED HR: 65.73 %
SINUS: 3.2 CM
STJ: 2.8 CM
STRESS BASELINE BP: NORMAL MMHG
STRESS BASELINE HR: 67 BPM
STRESS PERCENT HR: 77 %
STRESS POST ESTIMATED WORKLOAD: 9.8 METS
STRESS POST EXERCISE DUR MIN: 8 MIN
STRESS POST EXERCISE DUR SEC: 39 SEC
STRESS POST PEAK BP: NORMAL MMHG
STRESS POST PEAK HR: 117 BPM
STRESS TARGET HR: 151 BPM
STRESS TARGET HR: 151 BPM

## 2021-08-25 RX ORDER — FAMOTIDINE 20 MG/1
TABLET, FILM COATED ORAL
Qty: 60 TABLET | Refills: 0 | OUTPATIENT
Start: 2021-08-25

## 2021-08-30 ENCOUNTER — TELEPHONE (OUTPATIENT)
Dept: CARDIOLOGY | Facility: CLINIC | Age: 41
End: 2021-08-30

## 2021-08-30 NOTE — TELEPHONE ENCOUNTER
----- Message from PAUL Hassan sent at 8/26/2021  2:50 PM EDT -----  Please let him know his echo and stress were unremarkable.     Thanks, Esvin

## 2021-09-01 RX ORDER — HYDRALAZINE HYDROCHLORIDE 25 MG/1
25 TABLET, FILM COATED ORAL EVERY 8 HOURS SCHEDULED
Qty: 90 TABLET | Refills: 5 | Status: SHIPPED | OUTPATIENT
Start: 2021-09-01 | End: 2021-10-31 | Stop reason: SDUPTHER

## 2021-09-29 ENCOUNTER — TELEPHONE (OUTPATIENT)
Dept: CARDIOLOGY | Facility: CLINIC | Age: 41
End: 2021-09-29

## 2021-10-07 ENCOUNTER — TELEPHONE (OUTPATIENT)
Dept: CARDIOLOGY | Facility: CLINIC | Age: 41
End: 2021-10-07

## 2021-10-07 NOTE — TELEPHONE ENCOUNTER
----- Message from Fely Landis sent at 10/6/2021  1:06 PM EDT -----  Regarding: STOP PLAVIX??  IS HAVING A KIDNEY BIOPSY ON 2 WKS, NEEDS TO STOP PLAVIX AND ASPRIN    CALL WALESKA WITH [facility]  [phone]    [facility] for her to call back   confrimed instructions

## 2021-10-26 RX ORDER — FAMOTIDINE 20 MG/1
TABLET, FILM COATED ORAL
Qty: 60 TABLET | Refills: 0 | OUTPATIENT
Start: 2021-10-26

## 2021-12-13 ENCOUNTER — PRE-ADMISSION TESTING (OUTPATIENT)
Dept: PREADMISSION TESTING | Facility: HOSPITAL | Age: 41
End: 2021-12-13

## 2021-12-13 VITALS
HEART RATE: 67 BPM | WEIGHT: 230 LBS | HEIGHT: 72 IN | RESPIRATION RATE: 16 BRPM | TEMPERATURE: 98.2 F | BODY MASS INDEX: 31.15 KG/M2 | SYSTOLIC BLOOD PRESSURE: 160 MMHG | OXYGEN SATURATION: 100 % | DIASTOLIC BLOOD PRESSURE: 80 MMHG

## 2021-12-13 LAB
ANION GAP SERPL CALCULATED.3IONS-SCNC: 11.8 MMOL/L (ref 5–15)
BUN SERPL-MCNC: 68 MG/DL (ref 6–20)
BUN/CREAT SERPL: 10.6 (ref 7–25)
CALCIUM SPEC-SCNC: 8.6 MG/DL (ref 8.6–10.5)
CHLORIDE SERPL-SCNC: 107 MMOL/L (ref 98–107)
CO2 SERPL-SCNC: 20.2 MMOL/L (ref 22–29)
CREAT SERPL-MCNC: 6.43 MG/DL (ref 0.76–1.27)
DEPRECATED RDW RBC AUTO: 41.4 FL (ref 37–54)
ERYTHROCYTE [DISTWIDTH] IN BLOOD BY AUTOMATED COUNT: 12.2 % (ref 12.3–15.4)
GFR SERPL CREATININE-BSD FRML MDRD: 10 ML/MIN/1.73
GFR SERPL CREATININE-BSD FRML MDRD: ABNORMAL ML/MIN/{1.73_M2}
GLUCOSE SERPL-MCNC: 96 MG/DL (ref 65–99)
HCT VFR BLD AUTO: 29.9 % (ref 37.5–51)
HGB BLD-MCNC: 9.8 G/DL (ref 13–17.7)
MCH RBC QN AUTO: 30.5 PG (ref 26.6–33)
MCHC RBC AUTO-ENTMCNC: 32.8 G/DL (ref 31.5–35.7)
MCV RBC AUTO: 93.1 FL (ref 79–97)
PLATELET # BLD AUTO: 168 10*3/MM3 (ref 140–450)
PMV BLD AUTO: 9.9 FL (ref 6–12)
POTASSIUM SERPL-SCNC: 4.5 MMOL/L (ref 3.5–5.2)
QT INTERVAL: 460 MS
RBC # BLD AUTO: 3.21 10*6/MM3 (ref 4.14–5.8)
SARS-COV-2 ORF1AB RESP QL NAA+PROBE: NOT DETECTED
SODIUM SERPL-SCNC: 139 MMOL/L (ref 136–145)
WBC NRBC COR # BLD: 8.69 10*3/MM3 (ref 3.4–10.8)

## 2021-12-13 PROCEDURE — 36415 COLL VENOUS BLD VENIPUNCTURE: CPT

## 2021-12-13 PROCEDURE — 85027 COMPLETE CBC AUTOMATED: CPT

## 2021-12-13 PROCEDURE — 93005 ELECTROCARDIOGRAM TRACING: CPT

## 2021-12-13 PROCEDURE — 80048 BASIC METABOLIC PNL TOTAL CA: CPT

## 2021-12-13 PROCEDURE — 93010 ELECTROCARDIOGRAM REPORT: CPT | Performed by: INTERNAL MEDICINE

## 2021-12-13 PROCEDURE — C9803 HOPD COVID-19 SPEC COLLECT: HCPCS | Performed by: NURSE PRACTITIONER

## 2021-12-13 PROCEDURE — U0004 COV-19 TEST NON-CDC HGH THRU: HCPCS | Performed by: NURSE PRACTITIONER

## 2021-12-13 RX ORDER — CHLORHEXIDINE GLUCONATE 500 MG/1
CLOTH TOPICAL
COMMUNITY
End: 2021-12-15 | Stop reason: HOSPADM

## 2021-12-13 NOTE — DISCHARGE INSTRUCTIONS
Take the following medications the morning of surgery:    METOPROLOL  LAMICTAL  HYDRALAZINE  FLUOXETINE  AMLODIPINE    ARRIVE AT 0700.      If you are on prescription narcotic pain medication to control your pain you may also take that medication the morning of surgery.    General Instructions:  • Do not eat solid food after midnight the night before surgery.  • You may drink clear liquids day of surgery but must stop at least one hour before your hospital arrival time.  • It is beneficial for you to have a clear drink that contains carbohydrates the day of surgery.  We suggest a 12 to 20 ounce bottle of Gatorade or Powerade for non-diabetic patients or a 12 to 20 ounce bottle of G2 or Powerade Zero for diabetic patients. (Pediatric patients, are not advised to drink a 12 to 20 ounce carbohydrate drink)    Clear liquids are liquids you can see through.  Nothing red in color.     Plain water                               Sports drinks  Sodas                                   Gelatin (Jell-O)  Fruit juices without pulp such as white grape juice and apple juice  Popsicles that contain no fruit or yogurt  Tea or coffee (no cream or milk added)  Gatorade / Powerade  G2 / Powerade Zero    • Infants may have breast milk up to four hours before surgery.  • Infants drinking formula may drink formula up to six hours before surgery.   • Patients who avoid smoking, chewing tobacco and alcohol for 4 weeks prior to surgery have a reduced risk of post-operative complications.  Quit smoking as many days before surgery as you can.  • Do not smoke, use chewing tobacco or drink alcohol the day of surgery.   • If applicable bring your C-PAP/ BI-PAP machine.  • Bring any papers given to you in the doctor’s office.  • Wear clean comfortable clothes.  • Do not wear contact lenses, false eyelashes or make-up.  Bring a case for your glasses.   • Bring crutches or walker if applicable.  • Remove all piercings.  Leave jewelry and any other  valuables at home.  • Hair extensions with metal clips must be removed prior to surgery.  • The Pre-Admission Testing nurse will instruct you to bring medications if unable to obtain an accurate list in Pre-Admission Testing.        If you were given a blood bank ID arm band remember to bring it with you the day of surgery.    Preventing a Surgical Site Infection:  • For 2 to 3 days before surgery, avoid shaving with a razor because the razor can irritate skin and make it easier to develop an infection.    • Any areas of open skin can increase the risk of a post-operative wound infection by allowing bacteria to enter and travel throughout the body.  Notify your surgeon if you have any skin wounds / rashes even if it is not near the expected surgical site.  The area will need assessed to determine if surgery should be delayed until it is healed.  • The night prior to surgery shower using a fresh bar of anti-bacterial soap (such as Dial) and clean washcloth.  Sleep in a clean bed with clean clothing.  Do not allow pets to sleep with you.  • Shower on the morning of surgery using a fresh bar of anti-bacterial soap (such as Dial) and clean washcloth.  Dry with a clean towel and dress in clean clothing.  • Ask your surgeon if you will be receiving antibiotics prior to surgery.  • Make sure you, your family, and all healthcare providers clean their hands with soap and water or an alcohol based hand  before caring for you or your wound.    Day of surgery:  Your arrival time is approximately two hours before your scheduled surgery time.  Upon arrival, a Pre-op nurse and Anesthesiologist will review your health history, obtain vital signs, and answer questions you may have.  The only belongings needed at this time will be a list of your home medications and if applicable your C-PAP/BI-PAP machine.  A Pre-op nurse will start an IV and you may receive medication in preparation for surgery, including something to help  you relax.     Please be aware that surgery does come with discomfort.  We want to make every effort to control your discomfort so please discuss any uncontrolled symptoms with your nurse.   Your doctor will most likely have prescribed pain medications.      If you are going home after surgery you will receive individualized written care instructions before being discharged.  A responsible adult must drive you to and from the hospital on the day of your surgery and stay with you for 24 hours.  Discharge prescriptions can be filled by the hospital pharmacy during regular pharmacy hours.  If you are having surgery late in the day/evening your prescription may be e-prescribed to your pharmacy.  Please verify your pharmacy hours or chose a 24 hour pharmacy to avoid not having access to your prescription because your pharmacy has closed for the day.    If you are staying overnight following surgery, you will be transported to your hospital room following the recovery period.  [facility] has all private rooms.    If you have any questions please call Pre-Admission Testing at [phone].  Deductibles and co-payments are collected on the day of service. Please be prepared to pay the required co-pay, deductible or deposit on the day of service as defined by your plan.    Patient Education for Self-Quarantine Process    • Following your COVID testing, we strongly recommend that you wear a mask when you are with other people and practice social distancing.   • Limit your activities to only required outings.  • Wash your hands with soap and water frequently for at least 20 seconds.   • Avoid touching your eyes, nose and mouth with unwashed hands.  • Do not share anything - utensils, drinking glasses, food from the same bowl.   • Sanitize household surfaces daily. Include all high touch areas (door handles, light switches, phones, countertops, etc.)    Call your surgeon immediately if you experience any of the  following symptoms:  • Sore Throat  • Shortness of Breath or difficulty breathing  • Cough  • Chills  • Body soreness or muscle pain  • Headache  • Fever  • New loss of taste or smell  • Do not arrive for your surgery ill.  Your procedure will need to be rescheduled to another time.  You will need to call your physician before the day of surgery to avoid any unnecessary exposure to hospital staff as well as other patients.      CHLORHEXIDINE CLOTH INSTRUCTIONS  The morning of surgery follow these instructions using the Chlorhexidine cloths you've been given.  These steps reduce bacteria on the body.  Do not use the cloths near your eyes, ears mouth, genitalia or on open wounds.  Throw the cloths away after use but do not try to flush them down a toilet.      • Open and remove one cloth at a time from the package.    • Leave the cloth unfolded and begin the bathing.  • Massage the skin with the cloths using gentle pressure to remove bacteria.  Do not scrub harshly.   • Follow the steps below with one 2% CHG cloth per area (6 total cloths).  • One cloth for neck, shoulders and chest.  • One cloth for both arms, hands, fingers and underarms (do underarms last).  • One cloth for the abdomen followed by groin.  • One cloth for right leg and foot including between the toes.  • One cloth for left leg and foot including between the toes.  • The last cloth is to be used for the back of the neck, back and buttocks.    Allow the CHG to air dry 3 minutes on the skin which will give it time to work and decrease the chance of irritation.  The skin may feel sticky until it is dry.  Do not rinse with water or any other liquid or you will lose the beneficial effects of the CHG.  If mild skin irritation occurs, do rinse the skin to remove the CHG.  Report this to the nurse at time of admission.  Do not apply lotions, creams, ointments, deodorants or perfumes after using the clothes. Dress in clean clothes before coming to the  [facility].

## 2021-12-15 ENCOUNTER — HOSPITAL ENCOUNTER (OUTPATIENT)
Facility: HOSPITAL | Age: 41
Setting detail: HOSPITAL OUTPATIENT SURGERY
Discharge: HOME | End: 2021-12-15
Attending: SURGERY | Admitting: SURGERY

## 2021-12-15 ENCOUNTER — ANESTHESIA EVENT (OUTPATIENT)
Dept: PERIOP | Facility: HOSPITAL | Age: 41
End: 2021-12-15

## 2021-12-15 ENCOUNTER — ANESTHESIA (OUTPATIENT)
Dept: PERIOP | Facility: HOSPITAL | Age: 41
End: 2021-12-15

## 2021-12-15 VITALS
OXYGEN SATURATION: 97 % | BODY MASS INDEX: 30.4 KG/M2 | RESPIRATION RATE: 16 BRPM | DIASTOLIC BLOOD PRESSURE: 96 MMHG | SYSTOLIC BLOOD PRESSURE: 145 MMHG | HEIGHT: 72 IN | WEIGHT: 224.4 LBS | TEMPERATURE: 98 F | HEART RATE: 63 BPM

## 2021-12-15 DIAGNOSIS — N18.4 CKD (CHRONIC KIDNEY DISEASE) STAGE 4, GFR 15-29 ML/MIN (HCC): Primary | Chronic | ICD-10-CM

## 2021-12-15 LAB
ANION GAP SERPL CALCULATED.3IONS-SCNC: 12.7 MMOL/L (ref 5–15)
BUN SERPL-MCNC: 62 MG/DL (ref 6–20)
BUN/CREAT SERPL: 11.2 (ref 7–25)
CALCIUM SPEC-SCNC: 8.2 MG/DL (ref 8.6–10.5)
CHLORIDE SERPL-SCNC: 105 MMOL/L (ref 98–107)
CO2 SERPL-SCNC: 17.3 MMOL/L (ref 22–29)
CREAT SERPL-MCNC: 5.56 MG/DL (ref 0.76–1.27)
GFR SERPL CREATININE-BSD FRML MDRD: 11 ML/MIN/1.73
GFR SERPL CREATININE-BSD FRML MDRD: ABNORMAL ML/MIN/{1.73_M2}
GLUCOSE BLDC GLUCOMTR-MCNC: 101 MG/DL (ref 70–130)
GLUCOSE SERPL-MCNC: 83 MG/DL (ref 65–99)
POTASSIUM SERPL-SCNC: 4.6 MMOL/L (ref 3.5–5.2)
SODIUM SERPL-SCNC: 135 MMOL/L (ref 136–145)

## 2021-12-15 PROCEDURE — 25010000002 FENTANYL CITRATE (PF) 50 MCG/ML SOLUTION: Performed by: ANESTHESIOLOGY

## 2021-12-15 PROCEDURE — 82962 GLUCOSE BLOOD TEST: CPT

## 2021-12-15 PROCEDURE — 80048 BASIC METABOLIC PNL TOTAL CA: CPT | Performed by: ANESTHESIOLOGY

## 2021-12-15 PROCEDURE — 0 CEFAZOLIN PER 500 MG: Performed by: SURGERY

## 2021-12-15 PROCEDURE — 0 MEPIVACAINE HCL (PF) 1.5 % SOLUTION: Performed by: ANESTHESIOLOGY

## 2021-12-15 PROCEDURE — 25010000002 PROTAMINE SULFATE PER 10 MG: Performed by: NURSE ANESTHETIST, CERTIFIED REGISTERED

## 2021-12-15 PROCEDURE — 76942 ECHO GUIDE FOR BIOPSY: CPT | Performed by: SURGERY

## 2021-12-15 PROCEDURE — 0 CEFAZOLIN IN DEXTROSE 2-4 GM/100ML-% SOLUTION: Performed by: SURGERY

## 2021-12-15 PROCEDURE — 25010000002 HEPARIN (PORCINE) PER 1000 UNITS: Performed by: NURSE ANESTHETIST, CERTIFIED REGISTERED

## 2021-12-15 PROCEDURE — 25010000002 MIDAZOLAM PER 1 MG: Performed by: ANESTHESIOLOGY

## 2021-12-15 PROCEDURE — C1889 IMPLANT/INSERT DEVICE, NOC: HCPCS | Performed by: SURGERY

## 2021-12-15 PROCEDURE — 25010000002 PROPOFOL 10 MG/ML EMULSION: Performed by: NURSE ANESTHETIST, CERTIFIED REGISTERED

## 2021-12-15 PROCEDURE — 25010000002 HEPARIN (PORCINE) PER 1000 UNITS: Performed by: SURGERY

## 2021-12-15 DEVICE — LIGACLIP MCA MULTIPLE CLIP APPLIERS, 20 SMALL CLIPS
Type: IMPLANTABLE DEVICE | Site: ARM | Status: FUNCTIONAL
Brand: LIGACLIP

## 2021-12-15 RX ORDER — NALOXONE HCL 0.4 MG/ML
0.2 VIAL (ML) INJECTION AS NEEDED
Status: DISCONTINUED | OUTPATIENT
Start: 2021-12-15 | End: 2021-12-15 | Stop reason: HOSPADM

## 2021-12-15 RX ORDER — FENTANYL CITRATE 50 UG/ML
50 INJECTION, SOLUTION INTRAMUSCULAR; INTRAVENOUS
Status: DISCONTINUED | OUTPATIENT
Start: 2021-12-15 | End: 2021-12-15 | Stop reason: HOSPADM

## 2021-12-15 RX ORDER — HYDRALAZINE HYDROCHLORIDE 20 MG/ML
5 INJECTION INTRAMUSCULAR; INTRAVENOUS
Status: DISCONTINUED | OUTPATIENT
Start: 2021-12-15 | End: 2021-12-15 | Stop reason: HOSPADM

## 2021-12-15 RX ORDER — PROTAMINE SULFATE 10 MG/ML
INJECTION, SOLUTION INTRAVENOUS AS NEEDED
Status: DISCONTINUED | OUTPATIENT
Start: 2021-12-15 | End: 2021-12-15 | Stop reason: SURG

## 2021-12-15 RX ORDER — BUPIVACAINE HYDROCHLORIDE 2.5 MG/ML
INJECTION, SOLUTION EPIDURAL; INFILTRATION; INTRACAUDAL
Status: COMPLETED | OUTPATIENT
Start: 2021-12-15 | End: 2021-12-15

## 2021-12-15 RX ORDER — BUPROPION HYDROCHLORIDE 200 MG/1
200 TABLET, EXTENDED RELEASE ORAL 2 TIMES DAILY
COMMUNITY

## 2021-12-15 RX ORDER — MIDAZOLAM HYDROCHLORIDE 1 MG/ML
1 INJECTION INTRAMUSCULAR; INTRAVENOUS
Status: DISCONTINUED | OUTPATIENT
Start: 2021-12-15 | End: 2021-12-15 | Stop reason: HOSPADM

## 2021-12-15 RX ORDER — DIPHENHYDRAMINE HYDROCHLORIDE 50 MG/ML
12.5 INJECTION INTRAMUSCULAR; INTRAVENOUS
Status: DISCONTINUED | OUTPATIENT
Start: 2021-12-15 | End: 2021-12-15 | Stop reason: HOSPADM

## 2021-12-15 RX ORDER — BREXPIPRAZOLE 2 MG/1
3 TABLET ORAL
COMMUNITY

## 2021-12-15 RX ORDER — FAMOTIDINE 10 MG/ML
20 INJECTION, SOLUTION INTRAVENOUS ONCE
Status: COMPLETED | OUTPATIENT
Start: 2021-12-15 | End: 2021-12-15

## 2021-12-15 RX ORDER — FENTANYL CITRATE 50 UG/ML
INJECTION, SOLUTION INTRAMUSCULAR; INTRAVENOUS
Status: COMPLETED | OUTPATIENT
Start: 2021-12-15 | End: 2021-12-15

## 2021-12-15 RX ORDER — LIDOCAINE HYDROCHLORIDE 20 MG/ML
INJECTION, SOLUTION INFILTRATION; PERINEURAL AS NEEDED
Status: DISCONTINUED | OUTPATIENT
Start: 2021-12-15 | End: 2021-12-15 | Stop reason: SURG

## 2021-12-15 RX ORDER — LABETALOL HYDROCHLORIDE 5 MG/ML
5 INJECTION, SOLUTION INTRAVENOUS
Status: DISCONTINUED | OUTPATIENT
Start: 2021-12-15 | End: 2021-12-15 | Stop reason: HOSPADM

## 2021-12-15 RX ORDER — HEPARIN SODIUM 1000 [USP'U]/ML
INJECTION, SOLUTION INTRAVENOUS; SUBCUTANEOUS AS NEEDED
Status: DISCONTINUED | OUTPATIENT
Start: 2021-12-15 | End: 2021-12-15 | Stop reason: SURG

## 2021-12-15 RX ORDER — SODIUM CHLORIDE 0.9 % (FLUSH) 0.9 %
3 SYRINGE (ML) INJECTION EVERY 12 HOURS SCHEDULED
Status: DISCONTINUED | OUTPATIENT
Start: 2021-12-15 | End: 2021-12-15 | Stop reason: HOSPADM

## 2021-12-15 RX ORDER — DULOXETIN HYDROCHLORIDE 60 MG/1
60 CAPSULE, DELAYED RELEASE ORAL 2 TIMES DAILY
COMMUNITY

## 2021-12-15 RX ORDER — MIDAZOLAM HYDROCHLORIDE 1 MG/ML
INJECTION INTRAMUSCULAR; INTRAVENOUS
Status: COMPLETED | OUTPATIENT
Start: 2021-12-15 | End: 2021-12-15

## 2021-12-15 RX ORDER — FLUMAZENIL 0.1 MG/ML
0.2 INJECTION INTRAVENOUS AS NEEDED
Status: DISCONTINUED | OUTPATIENT
Start: 2021-12-15 | End: 2021-12-15 | Stop reason: HOSPADM

## 2021-12-15 RX ORDER — DIPHENHYDRAMINE HCL 25 MG
25 CAPSULE ORAL
Status: DISCONTINUED | OUTPATIENT
Start: 2021-12-15 | End: 2021-12-15 | Stop reason: HOSPADM

## 2021-12-15 RX ORDER — EPHEDRINE SULFATE 50 MG/ML
5 INJECTION, SOLUTION INTRAVENOUS ONCE AS NEEDED
Status: DISCONTINUED | OUTPATIENT
Start: 2021-12-15 | End: 2021-12-15 | Stop reason: HOSPADM

## 2021-12-15 RX ORDER — PROPOFOL 10 MG/ML
VIAL (ML) INTRAVENOUS CONTINUOUS PRN
Status: DISCONTINUED | OUTPATIENT
Start: 2021-12-15 | End: 2021-12-15 | Stop reason: SURG

## 2021-12-15 RX ORDER — CEFAZOLIN SODIUM 2 G/100ML
2 INJECTION, SOLUTION INTRAVENOUS ONCE
Status: COMPLETED | OUTPATIENT
Start: 2021-12-15 | End: 2021-12-15

## 2021-12-15 RX ORDER — LIDOCAINE HYDROCHLORIDE 10 MG/ML
0.5 INJECTION, SOLUTION EPIDURAL; INFILTRATION; INTRACAUDAL; PERINEURAL ONCE AS NEEDED
Status: DISCONTINUED | OUTPATIENT
Start: 2021-12-15 | End: 2021-12-15 | Stop reason: HOSPADM

## 2021-12-15 RX ORDER — SODIUM CHLORIDE 9 MG/ML
9 INJECTION, SOLUTION INTRAVENOUS CONTINUOUS PRN
Status: DISCONTINUED | OUTPATIENT
Start: 2021-12-15 | End: 2021-12-15 | Stop reason: HOSPADM

## 2021-12-15 RX ORDER — SODIUM CHLORIDE 0.9 % (FLUSH) 0.9 %
3-10 SYRINGE (ML) INJECTION AS NEEDED
Status: DISCONTINUED | OUTPATIENT
Start: 2021-12-15 | End: 2021-12-15 | Stop reason: HOSPADM

## 2021-12-15 RX ORDER — ONDANSETRON 2 MG/ML
4 INJECTION INTRAMUSCULAR; INTRAVENOUS ONCE AS NEEDED
Status: DISCONTINUED | OUTPATIENT
Start: 2021-12-15 | End: 2021-12-15 | Stop reason: HOSPADM

## 2021-12-15 RX ADMIN — MIDAZOLAM 1 MG: 1 INJECTION INTRAMUSCULAR; INTRAVENOUS at 09:16

## 2021-12-15 RX ADMIN — PROTAMINE SULFATE 30 MG: 10 INJECTION, SOLUTION INTRAVENOUS at 10:54

## 2021-12-15 RX ADMIN — HEPARIN SODIUM 5000 UNITS: 1000 INJECTION INTRAVENOUS; SUBCUTANEOUS at 10:28

## 2021-12-15 RX ADMIN — FAMOTIDINE 20 MG: 10 INJECTION INTRAVENOUS at 08:49

## 2021-12-15 RX ADMIN — SODIUM CHLORIDE 9 ML/HR: 9 INJECTION, SOLUTION INTRAVENOUS at 09:35

## 2021-12-15 RX ADMIN — FENTANYL CITRATE 50 MCG: 0.05 INJECTION, SOLUTION INTRAMUSCULAR; INTRAVENOUS at 09:16

## 2021-12-15 RX ADMIN — MEPIVACAINE HYDROCHLORIDE 15 ML: 15 INJECTION, SOLUTION EPIDURAL; INFILTRATION at 09:10

## 2021-12-15 RX ADMIN — BUPIVACAINE HYDROCHLORIDE 20 ML: 2.5 INJECTION, SOLUTION EPIDURAL; INFILTRATION; INTRACAUDAL; PERINEURAL at 09:10

## 2021-12-15 RX ADMIN — CEFAZOLIN SODIUM 2 G: 2 INJECTION, SOLUTION INTRAVENOUS at 09:35

## 2021-12-15 RX ADMIN — PROPOFOL 100 MCG/KG/MIN: 10 INJECTION, EMULSION INTRAVENOUS at 09:48

## 2021-12-15 RX ADMIN — LIDOCAINE HYDROCHLORIDE 100 MG: 20 INJECTION, SOLUTION INFILTRATION; PERINEURAL at 09:48

## 2021-12-15 NOTE — ANESTHESIA PROCEDURE NOTES
Peripheral Block    Pre-sedation assessment completed: 12/15/2021 9:10 AM    Patient reassessed immediately prior to procedure    Patient location during procedure: holding area  Start time: 12/15/2021 9:10 AM  Stop time: 12/15/2021 9:22 AM  Reason for block: at surgeon's request and post-op pain management  Performed by  Anesthesiologist: Benson Foster MD  Preanesthetic Checklist  Completed: patient identified, IV checked, site marked, risks and benefits discussed, surgical consent, monitors and equipment checked, pre-op evaluation and timeout performed  Prep:  Sterile barriers:cap, gloves, gown, mask and sterile barriers  Prep: ChloraPrep  Patient monitoring: blood pressure monitoring, continuous pulse oximetry and EKG  Procedure    Sedation: yes  Performed under: local infiltration  Guidance:ultrasound guided    ULTRASOUND INTERPRETATION.  Using ultrasound guidance a 21 G gauge needle was placed in close proximity to the brachial plexus nerve, at which point, under ultrasound guidance anesthetic was injected in the area of the nerve and spread of the anesthesia was seen on ultrasound in close proximity thereto.  There were no abnormalities seen on ultrasound; a digital image was taken; and the patient tolerated the procedure with no complications. Images:still images obtained    Laterality:left  Block Type:supraclavicular  Injection Technique:single-shot  Needle Type:echogenic  Needle Gauge:21 G      Medications Used: Mepivacaine HCl (PF) (CARBOCAINE) 1.5 % injection, 15 mL  bupivacaine PF (MARCAINE) 0.25 % injection, 20 mL      Medications  Comment:Ultrasound Interpretation: Using ultrasound guidance, the needle was placed in close proximity to the target nerve and anesthetic was injected in the area of the target nerve and/or bundles, and spread of the anesthetic was seen on ultrasound in close proximity thereto.  There were no abnormalities seen on ultrasound; a digital / physical image was taken;  and the patient tolerated the procedure with no complications.   Block placed for postoperative pain control per surgeon request.    Post Assessment  Injection Assessment: negative aspiration for heme, no paresthesia on injection and incremental injection  Patient Tolerance:comfortable throughout block  Complications:no

## 2021-12-15 NOTE — ANESTHESIA POSTPROCEDURE EVALUATION
"Patient: Jai Gayle Jr.    Procedure Summary     Date: 12/15/21 Room / Location: [facility] OR 03 / [facility] MAIN OR    Anesthesia Start: 0940 Anesthesia Stop: 1123    Procedure: LEFT ARM BRACHIOCEPHALIC ARTERIOVENOUS FISTULA PLACEMENT (Left ) Diagnosis:     Surgeons: Devon Waller MD Provider: Alexus Rodriguez MD    Anesthesia Type: regional ASA Status: 4          Anesthesia Type: regional    Vitals  Vitals Value Taken Time   /96 12/15/21 1156   Temp     Pulse 63 12/15/21 1201   Resp 16 12/15/21 1155   SpO2 98 % 12/15/21 1202   Vitals shown include unvalidated device data.        Post Anesthesia Care and Evaluation    Patient location during evaluation: bedside  Patient participation: complete - patient participated  Level of consciousness: awake  Pain management: adequate  Airway patency: patent  Anesthetic complications: No anesthetic complications    Cardiovascular status: acceptable  Respiratory status: acceptable  Hydration status: acceptable    Comments: /96   Pulse 63   Temp 36.7 °C (98 °F) (Oral)   Resp 16   Ht 182.9 cm (72\")   Wt 102 kg (224 lb 6.4 oz)   SpO2 97%   BMI 30.43 kg/m²       "

## 2021-12-15 NOTE — OP NOTE
Operative Note  Location: [facility]    Pre-op Diagnosis: Chronic Kidney Disease Stage IV    Post-op Diagnosis: Same    Procedure(s):  LEFT ARM BRACHIOCEPHALIC ARTERIOVENOUS FISTULA PLACEMENT    Surgeon(s):  Devon Waller MD    Assistant: Adelita Medina    Anesthesia: Monitored Anesthesia Care with Regional    Estimated Blood Loss: minimal     Staff:   Circulator: Alexus Keen RN; Rose Nino RN  Scrub Person: Chas Dennis  Assistant: Adelita Medina  Other: Kiara Engle CSA    Complications: None    Specimen: None    Findings:   The patient has known unusual venous anatomy.  Basically his median antecubital comes off in the mid arm and then the forearm cephalic is all lateral.  I ultrasounded this and thought it was likely use usable but it is so very small only in the 2 to 2.5 mm range.  I think this fistula is at high risk for low flow and I do not think it would likely be improved with angioplasties.  Would encourage alternative options for dialysis like peritoneal dialysis.    Indications:  The patient is an 41 y.o. male referred for evaluation for AV fistula placement.  The patient has Chronic Kidney Disease Stage IV .  After evaluation in the office and ultrasound vein mapping of the upper extremities the patient was determined to be a candidate for brachiocephalic arteriovenous fistula.  The risks, benefits, and alternatives were discussed with the patient who agreed to proceed.  This includes but is not limited to nerve injury, vascular compromise, infection, and failure to mature.    Procedure:  The patient was taken to Operating Room and identified as Jai Gayle Jr. and the procedure verified as left brachial cephalic AVF. A Time Out was held and the above information confirmed.    In the operating room with the patient sedated the arm was mapped under ultrasound again to help determine the best location for incision.  A decision was made to make the incision below the  antecubital fossa.  Skin and subcutaneous tissues were anesthetized.  The skin and subcutaneous was divided sharply.  The cephalic vein was identified and dissected out circumferentially for a significant segment.  It was marked.  The bicipital aponeurosis was exposed.  Local anesthesia was administered below this and then the aponeurosis was divided sharply.  The brachial artery was exposed circumferentially.  Enough of the cephalic vein was exposed so that it could be ligated distally with a silk ligature and then transposed over to the brachial artery.  The vein is spatulated in order to fit the arterial anastomosis.  It was marked to reduce the risk of twisting.  The brachial artery was opened up with 11 blade scalpel and small Paz scissors.  A running anastomosis is done with a 6-0 Prolene suture.  It was flushed and de-aired prior to completion.  There was a good thrill to completion of the case.  There was a good Doppler signal at the wrist as well.  The wound was copious irrigated with an antibiotic irrigation and closed in 2 layers with Vicryl sutures.  Dermabond glue was used for the skin.  Patient tolerated it well and no intraoperative complications were immediately apparent.        Active Hospital Problems    Diagnosis  POA   • **CKD (chronic kidney disease) stage 4, GFR 15-29 ml/min ([facility]) [N18.4]  Unknown      Resolved Hospital Problems   No resolved problems to display.      Devon Waller MD     Date: 12/15/2021  Time: 11:05 EST

## 2022-01-17 NOTE — TELEPHONE ENCOUNTER
Rx Refill Note  Requested Prescriptions     Pending Prescriptions Disp Refills   • amLODIPine (NORVASC) 10 MG tablet [Pharmacy Med Name: AMLODIPINE BESYLATE 10 MG TAB] 90 tablet 1     Sig: TAKE 1 TABLET BY MOUTH EVERY DAY      Last office visit with prescribing clinician: 6/15/2021   Next office visit with prescribing clinician: Visit date not found            Blank Alcocer, [facility]  01/17/22, 08:48 EST

## 2022-01-18 RX ORDER — AMLODIPINE BESYLATE 10 MG/1
TABLET ORAL
Qty: 90 TABLET | Refills: 1 | Status: SHIPPED | OUTPATIENT
Start: 2022-01-18 | End: 2022-01-19 | Stop reason: SDUPTHER

## 2022-01-19 RX ORDER — AMLODIPINE BESYLATE 10 MG/1
10 TABLET ORAL DAILY
Qty: 90 TABLET | Refills: 1 | Status: SHIPPED | OUTPATIENT
Start: 2022-01-19 | End: 2022-05-02

## 2022-01-19 NOTE — TELEPHONE ENCOUNTER
Patient called in stating that he has changed pharmacy from [facility] to [facility].

## 2022-01-26 ENCOUNTER — OFFICE VISIT (OUTPATIENT)
Dept: SURGERY | Facility: CLINIC | Age: 42
End: 2022-01-26

## 2022-01-26 VITALS — HEIGHT: 72 IN | BODY MASS INDEX: 30.34 KG/M2 | WEIGHT: 224 LBS

## 2022-01-26 DIAGNOSIS — N18.5 CKD (CHRONIC KIDNEY DISEASE) STAGE 5, GFR LESS THAN 15 ML/MIN: Primary | ICD-10-CM

## 2022-01-26 PROCEDURE — 99214 OFFICE O/P EST MOD 30 MIN: CPT | Performed by: SURGERY

## 2022-01-26 RX ORDER — TORSEMIDE 20 MG/1
40 TABLET ORAL DAILY
COMMUNITY
Start: 2022-01-24 | End: 2022-01-26

## 2022-01-26 RX ORDER — FAMOTIDINE 20 MG/1
20 TABLET, FILM COATED ORAL 2 TIMES DAILY
COMMUNITY
Start: 2021-12-16 | End: 2022-06-30 | Stop reason: HOSPADM

## 2022-01-26 RX ORDER — SEVELAMER CARBONATE 800 MG/1
TABLET, FILM COATED ORAL
COMMUNITY
Start: 2021-11-19 | End: 2022-01-26

## 2022-01-26 RX ORDER — CEFAZOLIN SODIUM 2 G/100ML
2 INJECTION, SOLUTION INTRAVENOUS ONCE
Status: CANCELLED | OUTPATIENT
Start: 2022-02-07 | End: 2022-01-26

## 2022-01-26 RX ORDER — NICOTINE 21 MG/24HR
1 PATCH, TRANSDERMAL 24 HOURS TRANSDERMAL DAILY
COMMUNITY
Start: 2022-01-25 | End: 2022-01-26

## 2022-01-26 RX ORDER — DIAZEPAM 10 MG/1
TABLET ORAL
COMMUNITY
Start: 2021-11-18 | End: 2022-01-26

## 2022-01-26 RX ORDER — HYDROCODONE BITARTRATE AND ACETAMINOPHEN 5; 325 MG/1; MG/1
TABLET ORAL
COMMUNITY
Start: 2021-11-18 | End: 2022-01-26

## 2022-01-26 RX ORDER — HYDRALAZINE HYDROCHLORIDE 25 MG/1
25 TABLET, FILM COATED ORAL 3 TIMES DAILY
COMMUNITY
Start: 2021-12-24 | End: 2022-05-05

## 2022-01-26 NOTE — PROGRESS NOTES
Chief Complaint   Patient presents with   • PD cath consult       Subjective      Jai Gayle Jr. is a 41 y.o. male who is referred by Dr. Bossman Gonsalez to be evaluated for peritoneal dialysis catheter placement. Patient has CKD and is not on dialysis. Patient does have a AV access that will be fully matured in a couple of weeks.  patient has not had a recent intraabdominal infection. The patient reports having regular bowel movements.  Recently admitted to [facility] because of worsening fatigue and azotemia.  He was treated with diuretics.  Continues to experience fatigue nausea and intermittent episode of vomiting  Patient did not have a Colonoscopy.     Home Evaluation: To be done tomorrow    Past Medical History:   Diagnosis Date   • Anxiety    • BPH (benign prostatic hyperplasia)    • CKD (chronic kidney disease)    • COPD (chronic obstructive pulmonary disease) ([facility])    • Diabetes ([facility])    • Heart disease    • Hyperlipidemia    • Hypertension    • Keratosis pilaris    • MI (myocardial infarction) ([facility]) 08/2020   • PONV (postoperative nausea and vomiting)     HAS HAD SURGERY MORE RECENT WITHOUT N/V.       Past Surgical History:   Procedure Laterality Date   • ARTERIOVENOUS FISTULA/SHUNT SURGERY Left 12/15/2021    Procedure: LEFT ARM BRACHIOCEPHALIC ARTERIOVENOUS FISTULA PLACEMENT;  Surgeon: Devon Waller MD;  Location: [facility] OR;  Service: Vascular;  Laterality: Left;   • CARDIAC CATHETERIZATION N/A 8/8/2020    Procedure: Left Heart Cath;  Surgeon: Jason Carvalho MD;  Location: [facility] CATH INVASIVE LOCATION;  Service: Cardiovascular;  Laterality: N/A;   • CARDIAC CATHETERIZATION N/A 8/8/2020    Procedure: Coronary angiography;  Surgeon: Jason Carvalho MD;  Location: [facility] CATH INVASIVE LOCATION;  Service: Cardiovascular;  Laterality: N/A;   • CARDIAC CATHETERIZATION N/A 8/8/2020    Procedure: Left ventriculography;  Surgeon: Jason Carvalho MD;  Location: [facility]  INVASIVE LOCATION;  Service: Cardiovascular;  Laterality: N/A;   • CARDIAC CATHETERIZATION N/A 8/8/2020    Procedure: Stent MEE coronary;  Surgeon: Jason Carvalho MD;  Location: [facility] INVASIVE LOCATION;  Service: Cardiovascular;  Laterality: N/A;   • RENAL BIOPSY Left          Current Outpatient Medications:   •  amLODIPine (NORVASC) 10 MG tablet, Take 1 tablet by mouth Daily., Disp: 90 tablet, Rfl: 1  •  asenapine maleate (SAPHRIS) 10 MG sublingual tablet sublingual tablet, Place 10 mg under the tongue Every Night., Disp: , Rfl:   •  aspirin 81 MG EC tablet, Take 1 tablet by mouth Daily., Disp: 30 tablet, Rfl: 5  •  atorvastatin (LIPITOR) 80 MG tablet, Take 80 mg by mouth Every Night., Disp: , Rfl:   •  Brexpiprazole (Rexulti) 2 MG tablet, Take 2 mg by mouth Every Morning., Disp: , Rfl:   •  buPROPion SR (WELLBUTRIN SR) 200 MG 12 hr tablet, Take 200 mg by mouth 2 (Two) Times a Day., Disp: , Rfl:   •  clopidogrel (PLAVIX) 75 MG tablet, TAKE 1 TABLET BY MOUTH EVERY DAY (Patient taking differently: Take 75 mg by mouth Daily.), Disp: 90 tablet, Rfl: 1  •  DULoxetine (CYMBALTA) 60 MG capsule, Take 60 mg by mouth 2 (Two) Times a Day., Disp: , Rfl:   •  famotidine (PEPCID) 20 MG tablet, Take 20 mg by mouth 2 (Two) Times a Day., Disp: , Rfl:   •  FLUoxetine (PROzac) 20 MG tablet, Take 5 tablets by mouth Daily., Disp: , Rfl:   •  hydrALAZINE (APRESOLINE) 25 MG tablet, Take 25 mg by mouth 3 (Three) Times a Day., Disp: , Rfl:   •  lamoTRIgine (LaMICtal) 200 MG tablet, Take 200 mg by mouth 2 (Two) Times a Day., Disp: , Rfl:   •  metoprolol tartrate (LOPRESSOR) 25 MG tablet, TAKE 1 TABLET BY MOUTH EVERY 12 HOURS (Patient taking differently: Take 50 mg by mouth 2 (Two) Times a Day.), Disp: 180 tablet, Rfl: 1  •  multivitamin (multivitamin) tablet tablet, Take 1 tablet by mouth Daily. HOLD PER MD INSTR, Disp: , Rfl:   •  SEVELAMER HCL PO, Take 1,600 mg by mouth 3 (Three) Times a Day., Disp: , Rfl:   •  SODIUM  "BICARBONATE PO, Take 650 mg by mouth 2 (Two) Times a Day., Disp: , Rfl:   •  hydrALAZINE (APRESOLINE) 100 MG tablet, Take 100 mg by mouth 3 (Three) Times a Day., Disp: , Rfl:     No Known Allergies    Family History   Problem Relation Age of Onset   • Hypertension Mother    • Hyperlipidemia Mother    • Heart disease Mother    • Heart attack Mother    • Diabetes Mother    • Colon cancer Mother    • Heart failure Neg Hx    • Malig Hyperthermia Neg Hx        Social History     Socioeconomic History   • Marital status: Single   Tobacco Use   • Smoking status: Current Every Day Smoker     Packs/day: 1.50     Years: 34.00     Pack years: 51.00     Types: Cigarettes   • Smokeless tobacco: Never Used   Vaping Use   • Vaping Use: Never used   Substance and Sexual Activity   • Alcohol use: No   • Drug use: No   • Sexual activity: Defer         REVIEW OF SYSTEMS    Review of Systems   Constitutional: Positive for activity change and fatigue.   HENT: Negative for congestion and dental problem.    Eyes: Negative for discharge and itching.   Respiratory: Negative for apnea, cough and shortness of breath.    Cardiovascular: Positive for leg swelling. Negative for chest pain.   Gastrointestinal: Negative for abdominal distention and anal bleeding.   Endocrine: Negative for cold intolerance and heat intolerance.   Genitourinary: Positive for difficulty urinating. Negative for flank pain.   Musculoskeletal: Negative for arthralgias and back pain.   Skin: Negative for color change and pallor.   Allergic/Immunologic: Negative for environmental allergies and food allergies.   Neurological: Negative for dizziness and numbness.   Hematological: Negative for adenopathy. Does not bruise/bleed easily.   Psychiatric/Behavioral: The patient is nervous/anxious.        Physical Examination  Ht 182.9 cm (72\")   Wt 102 kg (224 lb)   BMI 30.38 kg/m²   Body mass index is 30.38 kg/m².  Physical Exam  Constitutional:       Appearance: Normal " appearance.   HENT:      Head: Normocephalic and atraumatic.      Nose: Nose normal.      Mouth/Throat:      Pharynx: Oropharynx is clear.   Eyes:      General: No scleral icterus.     Conjunctiva/sclera: Conjunctivae normal.   Cardiovascular:      Rate and Rhythm: Normal rate and regular rhythm.   Pulmonary:      Effort: Pulmonary effort is normal.      Breath sounds: Normal breath sounds.   Abdominal:      General: Bowel sounds are normal. There is no distension.      Palpations: Abdomen is soft. There is no mass.      Tenderness: There is no abdominal tenderness. There is no guarding or rebound.      Hernia: No hernia is present.   Musculoskeletal:         General: Normal range of motion.      Cervical back: Normal range of motion and neck supple.   Skin:     General: Skin is warm and dry.      Capillary Refill: Capillary refill takes less than 2 seconds.   Neurological:      General: No focal deficit present.      Mental Status: He is alert.   Psychiatric:         Mood and Affect: Mood normal.         Behavior: Behavior normal.       Labs 1/24/2022  Hemoglobin 9, platelets 187, hematocrit 27  Hemoglobin A1c 4.4  Creatinine 7.8, BUN 85  Chloride 109, CO2 18  Albumin 2.5    Assessment:   Jai Gayle Jr. is a 41 y.o. male with chronic kidney disease not on dialysis that we will need to have peritoneal dialysis for urgent start.  Unfortunately patient is on Plavix and will need to wait at least 5 days before proceeding.      The procedure was explained in detail to the patient including risks and benefits.  The benefits including the possibility of having dialysis at home without the side effects of the hemodialysis.  The risks including but not limited to catheter dislodgment, obstruction, malfunction, bleeding, infection and possible injury to surrounding organs during peritoneal access. He is interested in proceeding with laparoscopic placement of peritoneal dialysis catheter. The patient understands that  the catheter will not be used for dialysis for a period of approximately 2 weeks after its placement and that during this time they should not shower until the exit site is completely healed. The patient underwent at least one training session with the peritoneal dialysis nurse and their house was evaluated and is ready for the peritoneal dialysis. Patient verbalized understanding and agreed with the plan. All questions were answered at this time.      Plan:     - Laparoscopic peritoneal dialysis catheter placement, possible open.  - Preparation for surgery orders have been placed  - Surgery scheduling.     Orders Placed This Encounter   Procedures   • COVID PRE-OP / PRE-PROCEDURE SCREENING ORDER (NO ISOLATION) - Swab, Nasopharynx     Standing Status:   Future     Standing Expiration Date:   1/26/2023     Order Specific Question:   Please select your location:     Answer:   [facility]     Order Specific Question:   COVID Screening Order:     Answer:   Inpatient/Outpatient/ED/Urgent-APTIMA PANTHER, 24 HR TAT [[id number]]     Order Specific Question:   Previously tested for COVID-19?     Answer:   Yes     Order Specific Question:   Employed in healthcare setting?     Answer:   No     Order Specific Question:   Symptomatic for COVID-19 as defined by CDC?     Answer:   No     Order Specific Question:   Hospitalized for COVID-19?     Answer:   No     Order Specific Question:   Admitted to ICU for COVID-19?     Answer:   No     Order Specific Question:   Resident in a congregate (group) care setting?     Answer:   No     Order Specific Question:   Release to patient     Answer:   Immediate   • Basic metabolic panel     Standing Status:   Future     Standing Expiration Date:   1/27/2023     Order Specific Question:   Release to patient     Answer:   Immediate   • Follow anesthesia standing orders.   • Obtain informed consent     Order Specific Question:   Informed Consent Given For     Answer:   Laparoscopic peritoneal  dialysis catheter placement   • Provide NPO Instructions to Patient     Standing Status:   Future   • Chlorhexidine Skin Prep     Chlorhexidine Skin Prep and Instructions For All Patients Having A Procedure Requiring an Outward Incision if Not Allergic. If Allergic, Give Antibacterial Skin Wipes and Instructions. Do Not Use For Facial Cases or on Any Mucus Membranes.     Standing Status:   Future   • CBC and Differential     Standing Status:   Future     Standing Expiration Date:   1/27/2023     Order Specific Question:   Manual Differential     Answer:   No         Vamsi Regan MD  General, Minimally Invasive and Endoscopic Surgery  [facility]    [address] [place] [address], [address]  [place], [place], [address]  P: [phone]  F: [phone]

## 2022-01-31 ENCOUNTER — APPOINTMENT (OUTPATIENT)
Dept: GENERAL RADIOLOGY | Facility: HOSPITAL | Age: 42
End: 2022-01-31

## 2022-01-31 ENCOUNTER — HOSPITAL ENCOUNTER (INPATIENT)
Facility: HOSPITAL | Age: 42
LOS: 4 days | Discharge: LEFT BEFORE DISCHARGE | End: 2022-02-04
Attending: EMERGENCY MEDICINE | Admitting: HOSPITALIST

## 2022-01-31 DIAGNOSIS — Z99.2 ESRD NEEDING DIALYSIS: Primary | ICD-10-CM

## 2022-01-31 DIAGNOSIS — N19 UREMIA: ICD-10-CM

## 2022-01-31 DIAGNOSIS — R09.89 PULMONARY VASCULAR CONGESTION: ICD-10-CM

## 2022-01-31 DIAGNOSIS — R77.8 ELEVATED TROPONIN: ICD-10-CM

## 2022-01-31 DIAGNOSIS — N18.6 ESRD NEEDING DIALYSIS: Primary | ICD-10-CM

## 2022-01-31 DIAGNOSIS — D64.9 CHRONIC ANEMIA: ICD-10-CM

## 2022-01-31 LAB
ALBUMIN SERPL-MCNC: 2.8 G/DL (ref 3.5–5.2)
ALBUMIN/GLOB SERPL: 1 G/DL
ALP SERPL-CCNC: 71 U/L (ref 39–117)
ALT SERPL W P-5'-P-CCNC: 12 U/L (ref 1–41)
ANION GAP SERPL CALCULATED.3IONS-SCNC: 12.6 MMOL/L (ref 5–15)
AST SERPL-CCNC: 17 U/L (ref 1–40)
BASOPHILS # BLD AUTO: 0.07 10*3/MM3 (ref 0–0.2)
BASOPHILS NFR BLD AUTO: 0.8 % (ref 0–1.5)
BILIRUB SERPL-MCNC: 0.2 MG/DL (ref 0–1.2)
BUN SERPL-MCNC: 74 MG/DL (ref 6–20)
BUN/CREAT SERPL: 9.5 (ref 7–25)
CALCIUM SPEC-SCNC: 8.7 MG/DL (ref 8.6–10.5)
CHLORIDE SERPL-SCNC: 105 MMOL/L (ref 98–107)
CO2 SERPL-SCNC: 17.4 MMOL/L (ref 22–29)
CREAT SERPL-MCNC: 7.76 MG/DL (ref 0.76–1.27)
DEPRECATED RDW RBC AUTO: 39 FL (ref 37–54)
EOSINOPHIL # BLD AUTO: 0.38 10*3/MM3 (ref 0–0.4)
EOSINOPHIL NFR BLD AUTO: 4.5 % (ref 0.3–6.2)
ERYTHROCYTE [DISTWIDTH] IN BLOOD BY AUTOMATED COUNT: 12 % (ref 12.3–15.4)
GFR SERPL CREATININE-BSD FRML MDRD: 8 ML/MIN/1.73
GFR SERPL CREATININE-BSD FRML MDRD: ABNORMAL ML/MIN/{1.73_M2}
GLOBULIN UR ELPH-MCNC: 2.7 GM/DL
GLUCOSE SERPL-MCNC: 84 MG/DL (ref 65–99)
HCT VFR BLD AUTO: 28.9 % (ref 37.5–51)
HGB BLD-MCNC: 9.7 G/DL (ref 13–17.7)
IMM GRANULOCYTES # BLD AUTO: 0.04 10*3/MM3 (ref 0–0.05)
IMM GRANULOCYTES NFR BLD AUTO: 0.5 % (ref 0–0.5)
LYMPHOCYTES # BLD AUTO: 0.75 10*3/MM3 (ref 0.7–3.1)
LYMPHOCYTES NFR BLD AUTO: 8.8 % (ref 19.6–45.3)
MCH RBC QN AUTO: 30.2 PG (ref 26.6–33)
MCHC RBC AUTO-ENTMCNC: 33.6 G/DL (ref 31.5–35.7)
MCV RBC AUTO: 90 FL (ref 79–97)
MONOCYTES # BLD AUTO: 0.96 10*3/MM3 (ref 0.1–0.9)
MONOCYTES NFR BLD AUTO: 11.3 % (ref 5–12)
NEUTROPHILS NFR BLD AUTO: 6.33 10*3/MM3 (ref 1.7–7)
NEUTROPHILS NFR BLD AUTO: 74.1 % (ref 42.7–76)
NRBC BLD AUTO-RTO: 0 /100 WBC (ref 0–0.2)
PLATELET # BLD AUTO: 194 10*3/MM3 (ref 140–450)
PMV BLD AUTO: 10.3 FL (ref 6–12)
POTASSIUM SERPL-SCNC: 4.6 MMOL/L (ref 3.5–5.2)
PROT SERPL-MCNC: 5.5 G/DL (ref 6–8.5)
QT INTERVAL: 520 MS
RBC # BLD AUTO: 3.21 10*6/MM3 (ref 4.14–5.8)
SARS-COV-2 RNA PNL SPEC NAA+PROBE: NOT DETECTED
SODIUM SERPL-SCNC: 135 MMOL/L (ref 136–145)
TROPONIN T SERPL-MCNC: 0.03 NG/ML (ref 0–0.03)
WBC NRBC COR # BLD: 8.53 10*3/MM3 (ref 3.4–10.8)

## 2022-01-31 PROCEDURE — 85025 COMPLETE CBC W/AUTO DIFF WBC: CPT | Performed by: EMERGENCY MEDICINE

## 2022-01-31 PROCEDURE — 93010 ELECTROCARDIOGRAM REPORT: CPT | Performed by: INTERNAL MEDICINE

## 2022-01-31 PROCEDURE — 84484 ASSAY OF TROPONIN QUANT: CPT | Performed by: EMERGENCY MEDICINE

## 2022-01-31 PROCEDURE — 71045 X-RAY EXAM CHEST 1 VIEW: CPT

## 2022-01-31 PROCEDURE — 93005 ELECTROCARDIOGRAM TRACING: CPT | Performed by: EMERGENCY MEDICINE

## 2022-01-31 PROCEDURE — 99284 EMERGENCY DEPT VISIT MOD MDM: CPT

## 2022-01-31 PROCEDURE — 87635 SARS-COV-2 COVID-19 AMP PRB: CPT | Performed by: EMERGENCY MEDICINE

## 2022-01-31 PROCEDURE — 25010000002 MAGNESIUM SULFATE 2 GM/50ML SOLUTION: Performed by: EMERGENCY MEDICINE

## 2022-01-31 PROCEDURE — 80053 COMPREHEN METABOLIC PANEL: CPT | Performed by: EMERGENCY MEDICINE

## 2022-01-31 RX ORDER — MAGNESIUM SULFATE HEPTAHYDRATE 40 MG/ML
2 INJECTION, SOLUTION INTRAVENOUS ONCE
Status: COMPLETED | OUTPATIENT
Start: 2022-01-31 | End: 2022-01-31

## 2022-01-31 RX ADMIN — MAGNESIUM SULFATE HEPTAHYDRATE 2 G: 2 INJECTION, SOLUTION INTRAVENOUS at 18:55

## 2022-02-01 ENCOUNTER — APPOINTMENT (OUTPATIENT)
Dept: CARDIOLOGY | Facility: HOSPITAL | Age: 42
End: 2022-02-01

## 2022-02-01 LAB
ALBUMIN SERPL-MCNC: 2.9 G/DL (ref 3.5–5.2)
ANION GAP SERPL CALCULATED.3IONS-SCNC: 14 MMOL/L (ref 5–15)
BASOPHILS # BLD AUTO: 0.05 10*3/MM3 (ref 0–0.2)
BASOPHILS NFR BLD AUTO: 0.7 % (ref 0–1.5)
BH CV VAS DIALYSIS ARTERIAL ANASTOMOSIS DIAMETER: 0.16 CM
BH CV VAS DIALYSIS ARTERIAL ANASTOMOSIS EDV: 487 CM/SEC
BH CV VAS DIALYSIS ARTERIAL ANASTOMOSIS PSV: 817 CM/SEC
BH CV VAS DIALYSIS CONDUIT DIST DEPTH: 0.48 CM
BH CV VAS DIALYSIS CONDUIT DIST DIAMETER: 0.63 CM
BH CV VAS DIALYSIS CONDUIT DIST EDV: 78 CM/SEC
BH CV VAS DIALYSIS CONDUIT DIST FLOW VOL: 788 ML/MIN
BH CV VAS DIALYSIS CONDUIT DIST PSV: 128 CM/SEC
BH CV VAS DIALYSIS CONDUIT MID DEPTH: 0.46 CM
BH CV VAS DIALYSIS CONDUIT MID DIAMETER: 0.76 CM
BH CV VAS DIALYSIS CONDUIT MID EDV: 104 CM/SEC
BH CV VAS DIALYSIS CONDUIT MID PSV: 177 CM/SEC
BH CV VAS DIALYSIS CONDUIT MID/DIST DEPTH: 0.43 CM
BH CV VAS DIALYSIS CONDUIT MID/DIST DIAMETER: 0.75 CM
BH CV VAS DIALYSIS CONDUIT MID/DIST EDV: 64 CM/SEC
BH CV VAS DIALYSIS CONDUIT MID/DIST PSV: 111 CM/SEC
BH CV VAS DIALYSIS CONDUIT PROX DEPTH: 0.42 CM
BH CV VAS DIALYSIS CONDUIT PROX DIAMETER: 0.3 CM
BH CV VAS DIALYSIS CONDUIT PROX EDV: 438 CM/SEC
BH CV VAS DIALYSIS CONDUIT PROX PSV: 718 CM/SEC
BH CV VAS DIALYSIS CONDUIT PROX/MID DEPTH: 0.59 CM
BH CV VAS DIALYSIS CONDUIT PROX/MID DIAMETER: 0.32 CM
BH CV VAS DIALYSIS CONDUIT PROX/MID EDV: 395 CM/SEC
BH CV VAS DIALYSIS CONDUIT PROX/MID FLOW VOL: 1608 ML/MIN
BH CV VAS DIALYSIS CONDUIT PROX/MID PSV: 695 CM/SEC
BH CV VAS DIALYSIS LEFT BRANCH 1 DIAMETER: 0.43 CM
BH CV VAS DIALYSIS LEFT BRANCH 2 DIAMETER: 0.39 CM
BH CV VAS DIALYSIS LEFT BRANCH 3 DIAMETER: 0.32 CM
BH CV VAS DIALYSIS PRE-INFLOW BRACHIAL DIAMETER: 0.54 CM
BH CV VAS DIALYSIS PRE-INFLOW BRACHIAL EDV: 88 CM/SEC
BH CV VAS DIALYSIS PRE-INFLOW BRACHIAL FLOW VOL: 1674 ML/MIN
BH CV VAS DIALYSIS PRE-INFLOW BRACHIAL PSV: 153 CM/SEC
BH CV VAS DIALYSIS PRE-INFLOW RADIAL DIAMETER: 0.26 CM
BH CV VAS DIALYSIS PRE-INFLOW RADIAL EDV: 7 CM/SEC
BH CV VAS DIALYSIS PRE-INFLOW RADIAL PSV: 106 CM/SEC
BH CV VAS DIALYSIS VENOUS OUTFLOW CEPHALIC ARCH DIAMETE: 0.54 CM
BH CV VAS DIALYSIS VENOUS OUTFLOW CEPHALIC ARCH EDV: 90 CM/SEC
BH CV VAS DIALYSIS VENOUS OUTFLOW CEPHALIC ARCH PSV: 119 CM/SEC
BH CV VAS DIALYSIS VENOUS OUTFLOW SUBCLAVIAN DIAMETER: 0.3 CM
BUN SERPL-MCNC: 75 MG/DL (ref 6–20)
BUN/CREAT SERPL: 9.8 (ref 7–25)
CALCIUM SPEC-SCNC: 8.9 MG/DL (ref 8.6–10.5)
CHLORIDE SERPL-SCNC: 105 MMOL/L (ref 98–107)
CLOSE TME COLL+ADP + EPINEP PNL BLD: 82 % (ref 86–100)
CLOSURE TME COLL+ADP BLD: 178 SECONDS (ref 52–122)
CLOSURE TME COLL+EPINEP BLD: 172 SECONDS (ref 68–148)
CO2 SERPL-SCNC: 19 MMOL/L (ref 22–29)
CREAT SERPL-MCNC: 7.64 MG/DL (ref 0.76–1.27)
DEPRECATED RDW RBC AUTO: 41.6 FL (ref 37–54)
EOSINOPHIL # BLD AUTO: 0.4 10*3/MM3 (ref 0–0.4)
EOSINOPHIL NFR BLD AUTO: 5.7 % (ref 0.3–6.2)
ERYTHROCYTE [DISTWIDTH] IN BLOOD BY AUTOMATED COUNT: 12.3 % (ref 12.3–15.4)
FERRITIN SERPL-MCNC: 235 NG/ML (ref 30–400)
GFR SERPL CREATININE-BSD FRML MDRD: 8 ML/MIN/1.73
GFR SERPL CREATININE-BSD FRML MDRD: ABNORMAL ML/MIN/{1.73_M2}
GLUCOSE BLDC GLUCOMTR-MCNC: 107 MG/DL (ref 70–130)
GLUCOSE BLDC GLUCOMTR-MCNC: 161 MG/DL (ref 70–130)
GLUCOSE SERPL-MCNC: 102 MG/DL (ref 65–99)
HCT VFR BLD AUTO: 30.6 % (ref 37.5–51)
HGB BLD-MCNC: 10 G/DL (ref 13–17.7)
IMM GRANULOCYTES # BLD AUTO: 0.02 10*3/MM3 (ref 0–0.05)
IMM GRANULOCYTES NFR BLD AUTO: 0.3 % (ref 0–0.5)
IRON 24H UR-MRATE: 34 MCG/DL (ref 59–158)
IRON SATN MFR SERPL: 11 % (ref 20–50)
LYMPHOCYTES # BLD AUTO: 0.64 10*3/MM3 (ref 0.7–3.1)
LYMPHOCYTES NFR BLD AUTO: 9.2 % (ref 19.6–45.3)
MCH RBC QN AUTO: 30.1 PG (ref 26.6–33)
MCHC RBC AUTO-ENTMCNC: 32.7 G/DL (ref 31.5–35.7)
MCV RBC AUTO: 92.2 FL (ref 79–97)
MONOCYTES # BLD AUTO: 0.62 10*3/MM3 (ref 0.1–0.9)
MONOCYTES NFR BLD AUTO: 8.9 % (ref 5–12)
NEUTROPHILS NFR BLD AUTO: 5.24 10*3/MM3 (ref 1.7–7)
NEUTROPHILS NFR BLD AUTO: 75.2 % (ref 42.7–76)
NRBC BLD AUTO-RTO: 0 /100 WBC (ref 0–0.2)
PHOSPHATE SERPL-MCNC: 6 MG/DL (ref 2.5–4.5)
PLATELET # BLD AUTO: 168 10*3/MM3 (ref 140–450)
PMV BLD AUTO: 10.5 FL (ref 6–12)
POTASSIUM SERPL-SCNC: 4.1 MMOL/L (ref 3.5–5.2)
RBC # BLD AUTO: 3.32 10*6/MM3 (ref 4.14–5.8)
SODIUM SERPL-SCNC: 138 MMOL/L (ref 136–145)
TIBC SERPL-MCNC: 310 MCG/DL (ref 298–536)
TRANSFERRIN SERPL-MCNC: 208 MG/DL (ref 200–360)
WBC NRBC COR # BLD: 6.97 10*3/MM3 (ref 3.4–10.8)

## 2022-02-01 PROCEDURE — 80069 RENAL FUNCTION PANEL: CPT | Performed by: HOSPITALIST

## 2022-02-01 PROCEDURE — 93990 DOPPLER FLOW TESTING: CPT

## 2022-02-01 PROCEDURE — 99253 IP/OBS CNSLTJ NEW/EST LOW 45: CPT | Performed by: SURGERY

## 2022-02-01 PROCEDURE — 85576 BLOOD PLATELET AGGREGATION: CPT | Performed by: HOSPITALIST

## 2022-02-01 PROCEDURE — 84466 ASSAY OF TRANSFERRIN: CPT | Performed by: HOSPITALIST

## 2022-02-01 PROCEDURE — 36415 COLL VENOUS BLD VENIPUNCTURE: CPT | Performed by: HOSPITALIST

## 2022-02-01 PROCEDURE — 83540 ASSAY OF IRON: CPT | Performed by: HOSPITALIST

## 2022-02-01 PROCEDURE — 82728 ASSAY OF FERRITIN: CPT | Performed by: HOSPITALIST

## 2022-02-01 PROCEDURE — 85576 BLOOD PLATELET AGGREGATION: CPT | Performed by: SURGERY

## 2022-02-01 PROCEDURE — 85025 COMPLETE CBC W/AUTO DIFF WBC: CPT | Performed by: HOSPITALIST

## 2022-02-01 PROCEDURE — 82962 GLUCOSE BLOOD TEST: CPT

## 2022-02-01 RX ORDER — CLOPIDOGREL BISULFATE 75 MG/1
75 TABLET ORAL DAILY
Status: DISCONTINUED | OUTPATIENT
Start: 2022-02-01 | End: 2022-02-04 | Stop reason: HOSPADM

## 2022-02-01 RX ORDER — FAMOTIDINE 20 MG/1
20 TABLET, FILM COATED ORAL 2 TIMES DAILY
Status: DISCONTINUED | OUTPATIENT
Start: 2022-02-01 | End: 2022-02-04 | Stop reason: HOSPADM

## 2022-02-01 RX ORDER — SODIUM BICARBONATE 650 MG/1
1300 TABLET ORAL 3 TIMES DAILY
Status: DISCONTINUED | OUTPATIENT
Start: 2022-02-01 | End: 2022-02-04 | Stop reason: HOSPADM

## 2022-02-01 RX ORDER — MAGNESIUM SULFATE HEPTAHYDRATE 40 MG/ML
2 INJECTION, SOLUTION INTRAVENOUS ONCE
Status: DISCONTINUED | OUTPATIENT
Start: 2022-02-01 | End: 2022-02-01

## 2022-02-01 RX ORDER — ARIPIPRAZOLE 2 MG/1
2 TABLET ORAL DAILY
Status: DISCONTINUED | OUTPATIENT
Start: 2022-02-01 | End: 2022-02-04 | Stop reason: HOSPADM

## 2022-02-01 RX ORDER — FLUOXETINE HYDROCHLORIDE 20 MG/1
20 CAPSULE ORAL DAILY
Status: DISCONTINUED | OUTPATIENT
Start: 2022-02-01 | End: 2022-02-04 | Stop reason: HOSPADM

## 2022-02-01 RX ORDER — BUPROPION HYDROCHLORIDE 100 MG/1
200 TABLET, EXTENDED RELEASE ORAL 2 TIMES DAILY
Status: DISCONTINUED | OUTPATIENT
Start: 2022-02-01 | End: 2022-02-04 | Stop reason: HOSPADM

## 2022-02-01 RX ORDER — AMLODIPINE BESYLATE 10 MG/1
10 TABLET ORAL
Status: DISCONTINUED | OUTPATIENT
Start: 2022-02-01 | End: 2022-02-04 | Stop reason: HOSPADM

## 2022-02-01 RX ORDER — SEVELAMER CARBONATE 800 MG/1
1600 TABLET, FILM COATED ORAL
Status: DISCONTINUED | OUTPATIENT
Start: 2022-02-01 | End: 2022-02-04 | Stop reason: HOSPADM

## 2022-02-01 RX ORDER — ASPIRIN 81 MG/1
81 TABLET ORAL DAILY
Status: DISCONTINUED | OUTPATIENT
Start: 2022-02-01 | End: 2022-02-04 | Stop reason: HOSPADM

## 2022-02-01 RX ORDER — DULOXETIN HYDROCHLORIDE 60 MG/1
60 CAPSULE, DELAYED RELEASE ORAL 2 TIMES DAILY
Status: DISCONTINUED | OUTPATIENT
Start: 2022-02-01 | End: 2022-02-04 | Stop reason: HOSPADM

## 2022-02-01 RX ORDER — LAMOTRIGINE 100 MG/1
200 TABLET ORAL 2 TIMES DAILY
Status: DISCONTINUED | OUTPATIENT
Start: 2022-02-01 | End: 2022-02-04 | Stop reason: HOSPADM

## 2022-02-01 RX ORDER — ATORVASTATIN CALCIUM 20 MG/1
40 TABLET, FILM COATED ORAL NIGHTLY
Status: DISCONTINUED | OUTPATIENT
Start: 2022-02-01 | End: 2022-02-02

## 2022-02-01 RX ORDER — FLUOXETINE HYDROCHLORIDE 20 MG/5ML
20 LIQUID ORAL DAILY
Status: DISCONTINUED | OUTPATIENT
Start: 2022-02-01 | End: 2022-02-01

## 2022-02-01 RX ORDER — ATORVASTATIN CALCIUM 80 MG/1
80 TABLET, FILM COATED ORAL NIGHTLY
Status: DISCONTINUED | OUTPATIENT
Start: 2022-02-01 | End: 2022-02-01

## 2022-02-01 RX ORDER — BUMETANIDE 1 MG/1
1 TABLET ORAL DAILY
Status: DISCONTINUED | OUTPATIENT
Start: 2022-02-01 | End: 2022-02-04 | Stop reason: HOSPADM

## 2022-02-01 RX ORDER — HYDRALAZINE HYDROCHLORIDE 50 MG/1
100 TABLET, FILM COATED ORAL EVERY 8 HOURS SCHEDULED
Status: DISCONTINUED | OUTPATIENT
Start: 2022-02-01 | End: 2022-02-04 | Stop reason: HOSPADM

## 2022-02-01 RX ADMIN — FAMOTIDINE 20 MG: 20 TABLET, FILM COATED ORAL at 13:54

## 2022-02-01 RX ADMIN — ARIPIPRAZOLE 2 MG: 2 TABLET ORAL at 13:54

## 2022-02-01 RX ADMIN — HYDRALAZINE HYDROCHLORIDE 100 MG: 50 TABLET, FILM COATED ORAL at 20:53

## 2022-02-01 RX ADMIN — SEVELAMER CARBONATE 1600 MG: 800 TABLET, FILM COATED ORAL at 13:54

## 2022-02-01 RX ADMIN — FAMOTIDINE 20 MG: 20 TABLET, FILM COATED ORAL at 20:46

## 2022-02-01 RX ADMIN — SEVELAMER CARBONATE 1600 MG: 800 TABLET, FILM COATED ORAL at 09:24

## 2022-02-01 RX ADMIN — SODIUM BICARBONATE 1300 MG: 650 TABLET ORAL at 20:48

## 2022-02-01 RX ADMIN — METOPROLOL TARTRATE 25 MG: 25 TABLET ORAL at 09:23

## 2022-02-01 RX ADMIN — LAMOTRIGINE 200 MG: 100 TABLET ORAL at 20:47

## 2022-02-01 RX ADMIN — HYDRALAZINE HYDROCHLORIDE 100 MG: 50 TABLET, FILM COATED ORAL at 09:23

## 2022-02-01 RX ADMIN — ASPIRIN 81 MG: 81 TABLET, COATED ORAL at 13:54

## 2022-02-01 RX ADMIN — BUPROPION HYDROCHLORIDE 200 MG: 100 TABLET, EXTENDED RELEASE ORAL at 20:46

## 2022-02-01 RX ADMIN — DULOXETINE HYDROCHLORIDE 60 MG: 60 CAPSULE, DELAYED RELEASE ORAL at 13:55

## 2022-02-01 RX ADMIN — AMLODIPINE BESYLATE 10 MG: 10 TABLET ORAL at 09:24

## 2022-02-01 RX ADMIN — METOPROLOL TARTRATE 25 MG: 25 TABLET ORAL at 20:46

## 2022-02-01 RX ADMIN — FLUOXETINE HYDROCHLORIDE 20 MG: 20 CAPSULE ORAL at 15:38

## 2022-02-01 RX ADMIN — SODIUM BICARBONATE 1300 MG: 650 TABLET ORAL at 09:23

## 2022-02-01 RX ADMIN — LAMOTRIGINE 200 MG: 100 TABLET ORAL at 13:54

## 2022-02-01 RX ADMIN — BUMETANIDE 1 MG: 2 TABLET ORAL at 13:53

## 2022-02-01 RX ADMIN — SODIUM BICARBONATE 1300 MG: 650 TABLET ORAL at 15:38

## 2022-02-01 RX ADMIN — ATORVASTATIN CALCIUM 40 MG: 20 TABLET, FILM COATED ORAL at 20:51

## 2022-02-01 RX ADMIN — DULOXETINE HYDROCHLORIDE 60 MG: 60 CAPSULE, DELAYED RELEASE ORAL at 20:47

## 2022-02-01 RX ADMIN — BUPROPION HYDROCHLORIDE 200 MG: 100 TABLET, EXTENDED RELEASE ORAL at 13:55

## 2022-02-01 RX ADMIN — SEVELAMER CARBONATE 1600 MG: 800 TABLET, FILM COATED ORAL at 18:18

## 2022-02-01 NOTE — ED NOTES
Patient wearing mask, nurse wearing mask, n95, protective eyewear, face shield, gown and gloves for care and assessment.  Hand hygiene performed prior to and post care.       Roxy Morgan RN  01/31/22 2002

## 2022-02-01 NOTE — CONSULTS
[facility] Consult Note      Patient Name: Jai Gayle Jr.  : 1980  MRN: [id number]  Primary Care Physician:  Efren Ramos MD  Referring Physician: Mono Roberts MD  Date of admission: 2022    Subjective     Reason for Consult: CKD stage V.  HPI:   Jai Gayle Jr. is a 41 y.o. male known to have history of chronic kidney disease stage V secondary to biopsy-proven diabetic nephropathy, history of hypertension, hypervolemia, coronary artery disease status post stent placement, and anxiety disorder who presented to the hospital with worsening weakness and morning sickness.  Patient noted to recent admission to the hospital hospital at [facility] for lower extremity edema and mild fatigue at the time his diuretic dose was adjusted patient was discharged home.  Currently patient is doing much better.  Continues to feel fatigued, but that his fatigue is improved.  Continues to have morning nausea.  Appetite is not the best.      Review of Systems:   14 point review of systems is otherwise negative except for mentioned above on HPI    Personal History     Past Medical History:   Diagnosis Date   • Anxiety    • BPH (benign prostatic hyperplasia)    • CKD (chronic kidney disease)    • COPD (chronic obstructive pulmonary disease) ([facility])    • Diabetes ([facility])    • Heart disease    • Hyperlipidemia    • Hypertension    • Keratosis pilaris    • MI (myocardial infarction) ([facility]) 2020   • PONV (postoperative nausea and vomiting)     HAS HAD SURGERY MORE RECENT WITHOUT N/V.       Past Surgical History:   Procedure Laterality Date   • ARTERIOVENOUS FISTULA/SHUNT SURGERY Left 12/15/2021    Procedure: LEFT ARM BRACHIOCEPHALIC ARTERIOVENOUS FISTULA PLACEMENT;  Surgeon: Devon Waller MD;  Location: [facility];  Service: Vascular;  Laterality: Left;   • CARDIAC CATHETERIZATION N/A 2020    Procedure: Left Heart Cath;  Surgeon: Jason Carvalho MD;  Location:   [facility] CATH [facility]VASIVE [facility];  Service: Cardiovascular;  Laterality: N/A;   • CARDIAC CATHETERIZATION N/A 8/8/2020    Procedure: Coronary angiography;  Surgeon: Jason Carvalho MD;  Location:  [facility] CATH INVASIVE [facility];  Service: Cardiovascular;  Laterality: N/A;   • CARDIAC CATHETERIZATION N/A 8/8/2020    Procedure: Left ventriculography;  Surgeon: Jason Carvalho MD;  Location:  [facility]VASIVE [facility];  Service: Cardiovascular;  Laterality: N/A;   • CARDIAC CATHETERIZATION N/A 8/8/2020    Procedure: Stent MEE coronary;  Surgeon: Jason Carvalho MD;  Location:  [facility] CATH INVASIVE [facility];  Service: Cardiovascular;  Laterality: N/A;   • RENAL BIOPSY Left        Family History: family history includes Colon cancer in his mother; Diabetes in his mother; Heart attack in his mother; Heart disease in his mother; Hyperlipidemia in his mother; Hypertension in his mother.    Social History:  reports that he has been smoking cigarettes. He has a 51.00 pack-year smoking history. He has never used smokeless tobacco. He reports that he does not drink alcohol and does not use drugs.    Home Medications:  Prior to Admission medications    Medication Sig Start Date End Date Taking? Authorizing Provider   amLODIPine (NORVASC) 10 MG tablet Take 1 tablet by mouth Daily. 1/19/22   Jason Carvalho MD   asenapine maleate (SAPHRIS) 10 MG sublingual tablet sublingual tablet Place 10 mg under the tongue Every Night.    Emergency, Nurse Epic, RN   aspirin 81 MG EC tablet Take 1 tablet by mouth Daily. 11/12/20   Jason Carvalho MD   atorvastatin (LIPITOR) 80 MG tablet Take 80 mg by mouth Every Night.    Emergency, Nurse Epic, RN   Brexpiprazole (Rexulti) 2 MG tablet Take 2 mg by mouth Every Morning.    Provider, MD Aurelia   buPROPion SR (WELLBUTRIN SR) 200 MG 12 hr tablet Take 200 mg by mouth 2 (Two) Times a Day.    ProviderAurelia MD   clopidogrel (PLAVIX) 75 MG tablet TAKE 1 TABLET BY  MOUTH EVERY DAY  Patient taking differently: Take 75 mg by mouth Daily. 8/11/21   Jason Carvalho MD   DULoxetine (CYMBALTA) 60 MG capsule Take 60 mg by mouth 2 (Two) Times a Day.    Aurelia Lee MD   famotidine (PEPCID) 20 MG tablet Take 20 mg by mouth 2 (Two) Times a Day. 12/16/21   Aurelia Lee MD   FLUoxetine (PROzac) 20 MG tablet Take 5 tablets by mouth Daily.    Emergency, Nurse TIFFANIE Lazar   hydrALAZINE (APRESOLINE) 100 MG tablet Take 100 mg by mouth 3 (Three) Times a Day. 10/21/21 1/19/22  Emergency, Nurse TIFFANIE Lazar   hydrALAZINE (APRESOLINE) 25 MG tablet Take 25 mg by mouth 3 (Three) Times a Day. 12/24/21   Aurelia Lee MD   lamoTRIgine (LaMICtal) 200 MG tablet Take 200 mg by mouth 2 (Two) Times a Day.    Aurelia Lee MD   metoprolol tartrate (LOPRESSOR) 25 MG tablet TAKE 1 TABLET BY MOUTH EVERY 12 HOURS  Patient taking differently: Take 50 mg by mouth 2 (Two) Times a Day. 8/11/21   Jason Carvalho MD   multivitamin (multivitamin) tablet tablet Take 1 tablet by mouth Daily. HOLD PER MD WESTON    Emergency, Nurse Epic, RN   SEVELAMER HCL PO Take 1,600 mg by mouth 3 (Three) Times a Day.    Aurelia Lee MD   SODIUM BICARBONATE PO Take 650 mg by mouth 2 (Two) Times a Day.    Aurelia Lee MD       Allergies:  No Known Allergies    Objective     Vitals:   Temp:  [96.5 °F (35.8 °C)] 96.5 °F (35.8 °C)  Heart Rate:  [64-67] 67  Resp:  [18] 18  BP: (151-167)/() 162/99    Intake/Output Summary (Last 24 hours) at 2/1/2022 0950  Last data filed at 1/31/2022 1933  Gross per 24 hour   Intake 31.67 ml   Output --   Net 31.67 ml       Physical Exam:   Constitutional: Awake, alert, no acute distress.  HEENT: Sclera anicteric, no conjunctival injection  Neck: Supple, no thyromegaly, no lymphadenopathy, trachea at midline, no JVD  Respiratory: Clear to auscultation bilaterally, nonlabored respiration  Cardiovascular: RRR, no murmurs, no rubs or gallops, no  carotid bruit  Gastrointestinal: Positive bowel sounds, abdomen is soft, nontender and nondistended  : No palpable bladder  Musculoskeletal: Trace lower extremity edema, no clubbing or cyanosis  Psychiatric: Appropriate affect, cooperative  Neurologic: Oriented x3, moving all extremities, normal speech and mental status.  No asterixis  Skin: Warm and dry   Access: Right upper extremity AV fistula with good thrill    Scheduled Meds:     amLODIPine, 10 mg, Oral, Q24H  hydrALAZINE, 100 mg, Oral, Q8H  metoprolol tartrate, 25 mg, Oral, Q12H  sevelamer, 1,600 mg, Oral, TID With Meals  sodium bicarbonate, 1,300 mg, Oral, TID      IV Meds:        Results Reviewed:   I have personally reviewed the results from the time of this admission to 2/1/2022 09:50 EST     Lab Results   Component Value Date    GLUCOSE 84 01/31/2022    CALCIUM 8.7 01/31/2022     (L) 01/31/2022    K 4.6 01/31/2022    CO2 17.4 (L) 01/31/2022     01/31/2022    BUN 74 (H) 01/31/2022    CREATININE 7.76 (H) 01/31/2022    EGFRIFAFRI  01/31/2022      Comment:      <15 Indicative of kidney failure.    EGFRIFNONA 8 (L) 01/31/2022    BCR 9.5 01/31/2022    ANIONGAP 12.6 01/31/2022      Lab Results   Component Value Date    PHOS 3.4 08/11/2020    ALBUMIN 2.80 (L) 01/31/2022           Assessment / Plan     ASSESSMENT:  1.  CKD stage V with progressive decline I believe now he is ESRD.  Etiology of CKD was related to biopsy-proven diabetic nephrosclerosis with severe interstitial fibrosis.  He does show signs of mild uremic symptoms including nausea and fatigue.  Electrolytes fortunately and volume status are acceptable.  The patient is interested in peritoneal dialysis.  And he has a fistula that was placed more than 7 weeks ago.  2.  Hypertension with CKD: Blood pressures elevated.  Will restart home blood pressure medication.  3.  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  4.  Anxiety disorder.  5.  Coronary artery disease status post  PTCA on Plavix and aspirin.  6.  Metabolic acidosis on oral bicarbonate.  7.  Mild hyponatremia likely secondary to hypervolemia.  8.  Hyperphosphatemia on binders.  9.  Anemia of CKD    PLAN:  Patient does not have an acute indication for dialysis but he will probably need dialysis very soon.  He prefers to undergo peritoneal dialysis.  But patient is on Plavix and this probably will delay his surgical intervention for 5 to 7 days.  He does have a right upper extremity AV fistula with good thrill that has been placed more than 7 weeks ago and will discuss with vascular surgery the possibility of early cannulation.  -We will consult vascular surgery to assess maturity of the right upper extremity AV fistula.  -We will check platelet function test.  -Consult general surgery for PD catheter placement.  -We will continue to observe the patient very closely for any signs of deterioration and will assess the need for dialysis daily.  -Restart home blood pressure medication  -Placed on renal diet  -Continue diuretic therapy  -Check iron panel, PTH, and phosphorus  -Increase sodium bicarbonate to 1300 q. 3 times daily    Discussed with the patient, Dr. Regan, vascular surgery team, and nursing staff      Thank you for involving us in the care of Jai Gayle .  Please feel free to call with any questions.    Nick Peña MD  02/01/22  09:50 [facility]    [facility]  [phone]      Much of this encounter note is an electronic transcription/translation of spoken language to printed text. The electronic translation of spoken language may permit erroneous, or at times, nonsensical words or phrases to be inadvertently transcribed; Although I have reviewed the note for such errors, some may still exist.

## 2022-02-01 NOTE — CONSULTS
Name: Jai Gayle Jr. ADMIT: 2022   : 1980  PCP: Efren Ramos MD    MRN: [id number] LOS: 1 days   AGE/SEX: 41 y.o. male  ROOM: [facility]     Inpatient Vascular Surgery Consult  Consult performed by: Kenia Bucio MD  Consult ordered by: Nick Peña MD Ashlee Ann Vinyard, MD     LOS: 1 day   Patient Care Team:  Efren Ramos MD as PCP - General  Nick Peña MD as Consulting Physician (Nephrology)  Jason Carvalho MD as Consulting Physician (Cardiology)    Subjective     History of Present Illness  41 y.o. male with a history of HTN, HLD, CKD stage V, and diabetes mellitus who presents with itching and fatigue.  He was recently seen in the ED last week for shortness of breath and treated with diuretics.  He is a patient of Dr. Buck Waller, who creation a left brachiocephalic AV fistula on 12/15/21.  It was noted at the time of surgery that the patient had small veins and had a risk of non maturation of his AV fistula.  He denies any left arm symptoms of steal, no numbness, no tingling, no weakness, or color or temperature change of the left hand.  He has never been on dialysis.  He takes plavix daily.     Review of Systems   Constitutional: Positive for fatigue.   HENT: Negative.    Eyes: Negative.    Respiratory: Negative.    Cardiovascular: Negative.    Gastrointestinal: Negative.    Endocrine: Negative.    Genitourinary: Negative.    Musculoskeletal: Negative.    Skin: Negative.    Allergic/Immunologic: Negative.    Neurological: Negative.    Hematological: Negative.    Psychiatric/Behavioral: Negative.        Past Medical History:   Diagnosis Date   • Anxiety    • BPH (benign prostatic hyperplasia)    • CKD (chronic kidney disease)    • COPD (chronic obstructive pulmonary disease) ([facility])    • Diabetes ([facility])    • Heart disease    • Hyperlipidemia    • Hypertension    • Keratosis pilaris    • MI (myocardial infarction) ([facility]) 2020   • PONV  (postoperative nausea and vomiting)     HAS HAD SURGERY MORE RECENT WITHOUT N/V.       Past Surgical History:   Procedure Laterality Date   • ARTERIOVENOUS FISTULA/SHUNT SURGERY Left 12/15/2021    Procedure: LEFT ARM BRACHIOCEPHALIC ARTERIOVENOUS FISTULA PLACEMENT;  Surgeon: Devon Waller MD;  Location: [facility] MAIN OR;  Service: Vascular;  Laterality: Left;   • CARDIAC CATHETERIZATION N/A 8/8/2020    Procedure: Left Heart Cath;  Surgeon: Jason Carvalho MD;  [facility];  Service: Cardiovascular;  Laterality: N/A;   • CARDIAC CATHETERIZATION N/A 8/8/2020    Procedure: Coronary angiography;  Surgeon: Jason Carvalho MD;  Location:  [facility] CATH INVASIVE LOC[facility];  Service: Cardiovascular;  Laterality: N/A;   • CARDIAC CATHETERIZATION N/A 8/8/2020    Procedure: Left ventriculography;  Surgeon: Jason Carvalho MD;  [facility];  Service: Cardiovascular;  Laterality: N/A;   • CARDIAC CATHETERIZATION N/A 8/8/2020    Procedure: Stent MEE coronary;  Surgeon: Jason Carvalho MD;  Location: [facility] CATH INVASIVE LOCATION;  Service: Cardiovascular;  Laterality: N/A;   • RENAL BIOPSY Left        Family History   Problem Relation Age of Onset   • Hypertension Mother    • Hyperlipidemia Mother    • Heart disease Mother    • Heart attack Mother    • Diabetes Mother    • Colon cancer Mother    • Heart failure Neg Hx    • Malig Hyperthermia Neg Hx        Social History     Tobacco Use   • Smoking status: Current Every Day Smoker     Packs/day: 1.50     Years: 34.00     Pack years: 51.00     Types: Cigarettes   • Smokeless tobacco: Never Used   Vaping Use   • Vaping Use: Never used   Substance Use Topics   • Alcohol use: No   • Drug use: No       Allergies: Patient has no known allergies.    (Not in a hospital admission)    amLODIPine, 10 mg, Oral, Q24H  hydrALAZINE, 100 mg, Oral, Q8H  metoprolol tartrate, 25 mg, Oral, Q12H  sevelamer, 1,600  mg, Oral, TID With Meals  sodium bicarbonate, 1,300 mg, Oral, TID               Objective   Temp:  [96.5 °F (35.8 °C)] 96.5 °F (35.8 °C)  Heart Rate:  [64-67] 67  Resp:  [18] 18  BP: (151-167)/() 162/99    No intake/output data recorded.    Physical Exam  Constitutional:       General: He is not in acute distress.     Appearance: Normal appearance.   HENT:      Head: Normocephalic and atraumatic.      Right Ear: External ear normal.      Left Ear: External ear normal.      Nose: Nose normal.      Mouth/Throat:      Mouth: Mucous membranes are moist.      Pharynx: Oropharynx is clear.   Eyes:      Conjunctiva/sclera: Conjunctivae normal.      Pupils: Pupils are equal, round, and reactive to light.   Cardiovascular:      Rate and Rhythm: Normal rate and regular rhythm.      Comments: Palpable radial pulses bilaterally  Left arm AV fistula with good thrill  Left hand motor 5/5, sensation intact  Pulmonary:      Effort: Pulmonary effort is normal. No respiratory distress.   Abdominal:      General: Abdomen is flat.      Palpations: Abdomen is soft.   Musculoskeletal:         General: No swelling or deformity.      Cervical back: Normal range of motion and neck supple.   Skin:     General: Skin is warm and dry.      Capillary Refill: Capillary refill takes less than 2 seconds.      Comments: Left arm incision well healed   Neurological:      General: No focal deficit present.      Mental Status: He is alert and oriented to person, place, and time.   Psychiatric:         Mood and Affect: Mood normal.         Behavior: Behavior normal.         Results from last 7 days   Lab Units 01/31/22  1831   WBC 10*3/mm3 8.53   HEMOGLOBIN g/dL 9.7*   PLATELETS 10*3/mm3 194     Results from last 7 days   Lab Units 01/31/22  1831   SODIUM mmol/L 135*   POTASSIUM mmol/L 4.6   CHLORIDE mmol/L 105   CO2 mmol/L 17.4*   BUN mg/dL 74*   CREATININE mg/dL 7.76*   GLUCOSE mg/dL 84   Estimated Creatinine Clearance: 14.9 mL/min (A) (by C-G  formula based on SCr of 7.76 mg/dL (H)).      Imaging Studies:  Duplex AV access pending      Active Hospital Problems    Diagnosis  POA   • ESRD needing dialysis (HCC) [N18.6, Z99.2]  Yes      Resolved Hospital Problems   No resolved problems to display.     Problem Points:  2:  Patient has an established problem, worsening  Total problem points:2    Data Points:  1:  I have reviewed or order clinical lab test  1:  I have reviewed or order radiology test (except heart catheterization or echo)  2:  I have reviewed and summation of old records and/or discussed the patients care with another health care provider  Total data points:4 or more    Risk Points:  Moderate:  Chronic illness with mild exacerbation    MDM Prob point Data point Risk   SF 1 1 Minimal   Low 2 2 Low   Mod 3 3 Moderate   High 4 4 High     Code MDM History Exam Time   01149 SF/Low Detailed Detailed 30   38682 Mod Comprehensive Comprehensive 50   90666 High Comprehensive Comprehensive 70     Detailed history:  4 elements HPI or status of 3 chronic problems; 2-9 system ROS  Comprehensive:  4 elements HPI or status of 3 chronic problems;  10 system ROS    Detailed Exam:  12 findings from any organ system  Comprehensive Exam:  2 findings from each of 9 systems.     Billin    Assessment/Plan       ESRD needing dialysis (HCC)        41 y.o. male with a history of HTN, HLD, CKD stage V, and diabetes mellitus who presents with itching and fatigue, and is having worsening renal function.  No indications for emergent dialysis today, but we were asked to assess his left arm AV fistula that was created on 12/15/21 by Dr. Buck Waller for maturation.    -he has healed well from his procedure  -AV fistula duplex pending.  Discussed with nephrology, Dr. Peña.  Adequate flow volumes, diameter from mid to proximal portion, and depth for cannulation.  OK to start cannulating the AVF for hemodialysis.    I discussed the patients findings and my  recommendations with patient and consulting provider.  Please call my office with any question: [phone]    Kenia Bucio MD  02/01/22  09:31 EST

## 2022-02-01 NOTE — H&P
History and physical    Primary care physician   Dr. ALVES    Chief complaint  Nausea fatigue tired and itching    History of present illness  41-year-old white male with history of chronic kidney disease stage V and getting close to end-stage renal disease hypertension hyperlipidemia coronary artery disease depression and gastroesophageal reflux disease presented to [facility] emergency room with fatigue tired and itching for last 2 weeks which is getting worse.  Patient denies any fever chills chest pain shortness of breath abdominal pain  vomiting diarrhea.  Patient evaluated in ER and found to be fluid overloaded with uremic symptoms need to start hemodialysis admit for management.  Patient agreeable for hemodialysis today but wants to get a trial dialysis catheter to start peritoneal dialysis as an outpatient.    PAST MEDICAL HISTORY  • Anxiety     • BPH (benign prostatic hyperplasia)     • CKD (chronic kidney disease)     • COPD (chronic obstructive pulmonary disease) ([facility])     • Diabetes ([facility])     • Heart disease     • Hypertension     • Keratosis pilaris     • MI (myocardial infarction) ([facility]) 08/2020   • PONV (postoperative nausea and vomiting)        PAST SURGICAL HISTORY              Procedure Laterality Date   • ARTERIOVENOUS FISTULA/SHUNT SURGERY Left 12/15/2021     Procedure: LEFT ARM BRACHIOCEPHALIC ARTERIOVENOUS FISTULA PLACEMENT;  Surgeon: Devon Waller MD;  Location: [facility] OR;  Service: Vascular;  Laterality: Left;   • CARDIAC CATHETERIZATION N/A 8/8/2020     Procedure: Left Heart Cath;  Surgeon: Jason Carvalho MD;  Location: [facility] INVASIVE LOCATION;  Service: Cardiovascular;  Laterality: N/A;   • CARDIAC CATHETERIZATION N/A 8/8/2020     Procedure: Coronary angiography;  Surgeon: Jason Carvalho MD;  Location: [facility] CATH INVASIVE LOCATION;  Service: Cardiovascular;  Laterality: N/A;   • CARDIAC CATHETERIZATION N/A 8/8/2020     Procedure: Left  "ventriculography;  Surgeon: Jason Carvalho MD;  Location:  [facility] CATH INVASIVE LOCATION;  Service: Cardiovascular;  Laterality: N/A;   • CARDIAC CATHETERIZATION N/A 8/8/2020     Procedure: Stent MEE coronary;  Surgeon: Jason Carvalho MD;  Location:  [facility] CATH INVASIVE LOCATION;  Service: Cardiovascular;  Laterality: N/A;   • RENAL BIOPSY Left           FAMILY HISTORY           Problem Relation Age of Onset   • Hypertension Mother     • Hyperlipidemia Mother     • Heart disease Mother     • Heart attack Mother     • Diabetes Mother     • Colon cancer Mother     • Heart failure Neg Hx     • Malig Hyperthermia Neg Hx        SOCIAL HISTORY                 Socioeconomic History   • Marital status: Single   Tobacco Use   • Smoking status: Current Every Day Smoker       Packs/day: 1.50       Years: 34.00       Pack years: 51.00       Types: Cigarettes   • Smokeless tobacco: Never Used   Vaping Use   • Vaping Use: Never used   Substance and Sexual Activity   • Alcohol use: No   • Drug use: No   • Sexual activity: Defer         ALLERGIES  Patient has no known allergies.  Home medications reviewed     REVIEW OF SYSTEMS  All systems reviewed and negative except for those discussed in HPI.      PHYSICAL EXAM  Blood pressure 148/93, pulse 64, temperature 96.5 °F (35.8 °C), resp. rate 16, height 182.9 cm (72\"), weight 95.3 kg (210 lb), SpO2 98 %.    General: No acute distress.  HENT: NCAT, PERRL, Nares patent.  Eyes: no scleral icterus.  Neck: trachea midline, no ROM limitations.  CV: regular rhythm, regular rate.  Respiratory: normal effort, CTAB.  Abdomen: soft, nondistended, nontender bowel sounds positive  Musculoskeletal: no deformity.  Neuro: alert, moves all extremities, follows commands.  Skin: warm, dry.  AV fistula left upper extremity, no signs of infection.     LAB RESULTS  Lab Results (last 24 hours)     Procedure Component Value Units Date/Time    POC Glucose Once [[id number]]  (Normal) Collected: " 02/01/22 1256    Specimen: Blood Updated: 02/01/22 1258     Glucose 107 mg/dL      Comment: Meter: [id number] : [id number] Iker FROST       Platelet Function Test [[id number]]  (Abnormal) Collected: 02/01/22 0937    Specimen: Blood Updated: 02/01/22 1107     Collagen/Epinephrine 172 Seconds      Collagen/ Seconds     Narrative:      PFA Interpretation    Norm ADP / Norm EPI: Normal Plt Function                                                                                                                  Norm ADP / Abn EPI: Drug Induced Platelet Dysfunction, most commonly seen with Aspirin                              Abn ADP / Abn EPI: Abnormal Platelet Function, recommend further evaluation    Ferritin [[id number]]  (Normal) Collected: 02/01/22 0937    Specimen: Blood Updated: 02/01/22 1053     Ferritin 235.00 ng/mL     Narrative:      Results may be falsely decreased if patient taking Biotin.      Renal Function Panel [[id number]]  (Abnormal) Collected: 02/01/22 0937    Specimen: Blood Updated: 02/01/22 1051     Glucose 102 mg/dL      BUN 75 mg/dL      Creatinine 7.64 mg/dL      Sodium 138 mmol/L      Potassium 4.1 mmol/L      Chloride 105 mmol/L      CO2 19.0 mmol/L      Calcium 8.9 mg/dL      Albumin 2.90 g/dL      Phosphorus 6.0 mg/dL      Anion Gap 14.0 mmol/L      BUN/Creatinine Ratio 9.8     eGFR Non African Amer 8 mL/min/1.73      Comment: <15 Indicative of kidney failure.        eGFR   Amer --     Comment: <15 Indicative of kidney failure.       Narrative:      GFR Normal >60  Chronic Kidney Disease <60  Kidney Failure <15      Iron Profile [[id number]]  (Abnormal) Collected: 02/01/22 0937    Specimen: Blood Updated: 02/01/22 1051     Iron 34 mcg/dL      Iron Saturation 11 %      Transferrin 208 mg/dL      TIBC 310 mcg/dL     CBC & Differential [[id number]]  (Abnormal) Collected: 02/01/22 0937    Specimen: Blood Updated: 02/01/22 1001    Narrative:      The following orders were  created for panel order CBC & Differential.  Procedure                               Abnormality         Status                     ---------                               -----------         ------                     CBC Auto Differential[[id number]]        Abnormal            Final result                 Please view results for these tests on the individual orders.    CBC Auto Differential [[id number]]  (Abnormal) Collected: 02/01/22 0937    Specimen: Blood Updated: 02/01/22 1001     WBC 6.97 10*3/mm3      RBC 3.32 10*6/mm3      Hemoglobin 10.0 g/dL      Hematocrit 30.6 %      MCV 92.2 fL      MCH 30.1 pg      MCHC 32.7 g/dL      RDW 12.3 %      RDW-SD 41.6 fl      MPV 10.5 fL      Platelets 168 10*3/mm3      Neutrophil % 75.2 %      Lymphocyte % 9.2 %      Monocyte % 8.9 %      Eosinophil % 5.7 %      Basophil % 0.7 %      Immature Grans % 0.3 %      Neutrophils, Absolute 5.24 10*3/mm3      Lymphocytes, Absolute 0.64 10*3/mm3      Monocytes, Absolute 0.62 10*3/mm3      Eosinophils, Absolute 0.40 10*3/mm3      Basophils, Absolute 0.05 10*3/mm3      Immature Grans, Absolute 0.02 10*3/mm3      nRBC 0.0 /100 WBC     COVID PRE-OP / PRE-PROCEDURE SCREENING ORDER (NO ISOLATION) - Swab, Nasopharynx [[id number]]  (Normal) Collected: 01/31/22 1957    Specimen: Swab from Nasopharynx Updated: 01/31/22 2132    Narrative:      The following orders were created for panel order COVID PRE-OP / PRE-PROCEDURE SCREENING ORDER (NO ISOLATION) - Swab, Nasopharynx.  Procedure                               Abnormality         Status                     ---------                               -----------         ------                     COVID-19,[facility] IN-HOUSE...[[id number]]  Normal              Final result                 Please view results for these tests on the individual orders.    COVID-19,[facility] IN-HOUSE CEPHEID/KAYLIE NP SWAB IN TRANSPORT MEDIA 8-12 HR TAT - Swab, Nasopharynx [[id number]]  (Normal) Collected: 01/31/22 1957     Specimen: Swab from Nasopharynx Updated: 01/31/22 2132     COVID19 Not Detected    Narrative:      Fact sheet for providers: https://www.fda.gov/media/268346/download    Fact sheet for patients: https://www.fda.gov/media/613073/download    Test performed by PCR.    Troponin [[id number]]  (Abnormal) Collected: 01/31/22 1831    Specimen: Blood Updated: 01/31/22 1931     Troponin T 0.031 ng/mL     Narrative:      Troponin T Reference Range:  <= 0.03 ng/mL-   Negative for AMI  >0.03 ng/mL-     Abnormal for myocardial necrosis.  Clinicians would have to utilize clinical acumen, EKG, Troponin and serial changes to determine if it is an Acute Myocardial Infarction or myocardial injury due to an underlying chronic condition.       Results may be falsely decreased if patient taking Biotin.      Comprehensive Metabolic Panel [[id number]]  (Abnormal) Collected: 01/31/22 1831    Specimen: Blood Updated: 01/31/22 1927     Glucose 84 mg/dL      BUN 74 mg/dL      Creatinine 7.76 mg/dL      Sodium 135 mmol/L      Potassium 4.6 mmol/L      Chloride 105 mmol/L      CO2 17.4 mmol/L      Calcium 8.7 mg/dL      Total Protein 5.5 g/dL      Albumin 2.80 g/dL      ALT (SGPT) 12 U/L      AST (SGOT) 17 U/L      Alkaline Phosphatase 71 U/L      Total Bilirubin 0.2 mg/dL      eGFR Non African Amer 8 mL/min/1.73      Comment: <15 Indicative of kidney failure.        eGFR   Amer --     Comment: <15 Indicative of kidney failure.        Globulin 2.7 gm/dL      A/G Ratio 1.0 g/dL      BUN/Creatinine Ratio 9.5     Anion Gap 12.6 mmol/L     Narrative:      GFR Normal >60  Chronic Kidney Disease <60  Kidney Failure <15      CBC & Differential [[id number]]  (Abnormal) Collected: 01/31/22 1831    Specimen: Blood Updated: 01/31/22 1840    Narrative:      The following orders were created for panel order CBC & Differential.  Procedure                               Abnormality         Status                     ---------                                -----------         ------                     CBC Auto Differential[[id number]]        Abnormal            Final result                 Please view results for these tests on the individual orders.    CBC Auto Differential [[id number]]  (Abnormal) Collected: 01/31/22 1831    Specimen: Blood Updated: 01/31/22 1840     WBC 8.53 10*3/mm3      RBC 3.21 10*6/mm3      Hemoglobin 9.7 g/dL      Hematocrit 28.9 %      MCV 90.0 fL      MCH 30.2 pg      MCHC 33.6 g/dL      RDW 12.0 %      RDW-SD 39.0 fl      MPV 10.3 fL      Platelets 194 10*3/mm3      Neutrophil % 74.1 %      Lymphocyte % 8.8 %      Monocyte % 11.3 %      Eosinophil % 4.5 %      Basophil % 0.8 %      Immature Grans % 0.5 %      Neutrophils, Absolute 6.33 10*3/mm3      Lymphocytes, Absolute 0.75 10*3/mm3      Monocytes, Absolute 0.96 10*3/mm3      Eosinophils, Absolute 0.38 10*3/mm3      Basophils, Absolute 0.07 10*3/mm3      Immature Grans, Absolute 0.04 10*3/mm3      nRBC 0.0 /100 WBC         Imaging Results (Last 24 Hours)     Procedure Component Value Units Date/Time    XR Chest 1 View [[id number]] Collected: 01/31/22 1856     Updated: 01/31/22 1912    Narrative:      CHEST SINGLE VIEW     HISTORY: End stage renal disease.     COMPARISON: AP chest 08/08/2020.      FINDINGS: The heart and the mediastinal structures within normal limits.  There is pulmonary vascular engorgement without evidence for pulmonary  edema. No focal airspace disease is demonstrated and there is no  evidence for pleural effusion. Cardiac monitoring leads are noted. There  are advanced arthritic changes at the left shoulder/glenohumeral joint.       Impression:      1. Pulmonary vascular engorgement without edema.  2. Advanced left glenohumeral joint arthritis.     This report was finalized on 1/31/2022 7:09 PM by Dr. Bereket Leon M.D.                ECG 12 Lead  Component   Ref Range & Units 1/31/22 1836 12/13/21 0740   QT Interval   ms 520  460              HEART RATE= 65   bpm  RR Interval= 923  ms  CT Interval= 172  ms  P Horizontal Axis= -11  deg  P Front Axis= 33  deg  QRSD Interval= 111  ms  QT Interval= 520  ms  QRS Axis= 18  deg  T Wave Axis= 78  deg  - ABNORMAL ECG -  Sinus rhythm  Probable left atrial enlargement  Incomplete right bundle branch block  Left ventricular hypertrophy  Prolonged QT interval  When compared with ECG of 13-Dec-2021 7:40:26,  NO SIGNIFICANT CHANGE             Current Facility-Administered Medications:   •  amLODIPine (NORVASC) tablet 10 mg, 10 mg, Oral, Q24H, Nick Peña MD, 10 mg at 02/01/22 0924  •  ARIPiprazole (ABILIFY) tablet 2 mg, 2 mg, Oral, Daily, Mono Roberts MD  •  aspirin EC tablet 81 mg, 81 mg, Oral, Daily, Mono Roberts MD  •  atorvastatin (LIPITOR) tablet 40 mg, 40 mg, Oral, Nightly, Mono Roberts MD  •  bumetanide (BUMEX) tablet 1 mg, 1 mg, Oral, Daily, Nick Peña MD  •  buPROPion SR (WELLBUTRIN SR) 12 hr tablet 200 mg, 200 mg, Oral, BID, Mono Roberts MD  •  clopidogrel (PLAVIX) tablet 75 mg, 75 mg, Oral, Daily, Mono Roberts MD  •  DULoxetine (CYMBALTA) DR capsule 60 mg, 60 mg, Oral, BID, Mono Roberts MD  •  famotidine (PEPCID) tablet 20 mg, 20 mg, Oral, BID, Mono Roberts MD  •  FLUoxetine (PROzac) 20 MG/5ML solution 20 mg, 20 mg, Oral, Daily, Mono Roberts MD  •  hydrALAZINE (APRESOLINE) tablet 100 mg, 100 mg, Oral, Q8H, Nick Peña MD, 100 mg at 02/01/22 0923  •  lamoTRIgine (LaMICtal) tablet 200 mg, 200 mg, Oral, BID, Mono Roberts MD  •  metoprolol tartrate (LOPRESSOR) tablet 25 mg, 25 mg, Oral, Q12H, Nick Peña MD, 25 mg at 02/01/22 0923  •  sevelamer (RENVELA) tablet 1,600 mg, 1,600 mg, Oral, TID With Meals, Nick Peña MD, 1,600 mg at 02/01/22 0924  •  sodium bicarbonate tablet 1,300 mg, 1,300 mg, Oral, TID, Nick Peña MD, 1,300 mg at 02/01/22 0923     ASSESSMENT  Nausea fatigue tired and eating with history of chronic kidney disease stage V.  Close to end-stage renal disease  and need to start hemodialysis but patient would prefer peritoneal dialysis as an outpatient.  Fluid overload  Coronary artery disease  Hypertension  Hyperlipidemia  History of psychosis  Chronic anemia  Gastroesophageal reflux disease    PLAN  Admit  Start hemodialysis as patient already had AV fistula   General surgery consult for peritoneal dialysis catheter placement  Nephrology to follow patient for hemodialysis  Continue home medications  Stress ulcer DVT prophylaxis  Supportive care  Patient is full code  Discussed with nursing staff  Follow closely further recommendation current hospital course    TOO PETERSON MD

## 2022-02-01 NOTE — ED NOTES
Pt in mask throughout encounter. This ERT was in appropriate PPE while in pt room.       Andre Waller, PCT  01/31/22 2020

## 2022-02-01 NOTE — ED NOTES
Patient wearing mask, nurse wearing mask, n95, protective eyewear for care and assessment.  Hand hygiene performed prior to and post care.       Roxy Morgan RN  01/31/22 1437

## 2022-02-02 LAB
ALBUMIN SERPL-MCNC: 2.9 G/DL (ref 3.5–5.2)
ALBUMIN/GLOB SERPL: 1 G/DL
ALP SERPL-CCNC: 69 U/L (ref 39–117)
ALT SERPL W P-5'-P-CCNC: 12 U/L (ref 1–41)
ANION GAP SERPL CALCULATED.3IONS-SCNC: 12 MMOL/L (ref 5–15)
AST SERPL-CCNC: 15 U/L (ref 1–40)
BASOPHILS # BLD AUTO: 0.05 10*3/MM3 (ref 0–0.2)
BASOPHILS NFR BLD AUTO: 0.7 % (ref 0–1.5)
BILIRUB SERPL-MCNC: <0.2 MG/DL (ref 0–1.2)
BUN SERPL-MCNC: 70 MG/DL (ref 6–20)
BUN/CREAT SERPL: 9.1 (ref 7–25)
CALCIUM SPEC-SCNC: 8.9 MG/DL (ref 8.6–10.5)
CHLORIDE SERPL-SCNC: 104 MMOL/L (ref 98–107)
CHOLEST SERPL-MCNC: 145 MG/DL (ref 0–200)
CO2 SERPL-SCNC: 19 MMOL/L (ref 22–29)
CREAT SERPL-MCNC: 7.72 MG/DL (ref 0.76–1.27)
DEPRECATED RDW RBC AUTO: 43.5 FL (ref 37–54)
EOSINOPHIL # BLD AUTO: 0.33 10*3/MM3 (ref 0–0.4)
EOSINOPHIL NFR BLD AUTO: 4.4 % (ref 0.3–6.2)
ERYTHROCYTE [DISTWIDTH] IN BLOOD BY AUTOMATED COUNT: 12.3 % (ref 12.3–15.4)
GFR SERPL CREATININE-BSD FRML MDRD: 8 ML/MIN/1.73
GFR SERPL CREATININE-BSD FRML MDRD: ABNORMAL ML/MIN/{1.73_M2}
GLOBULIN UR ELPH-MCNC: 2.8 GM/DL
GLUCOSE SERPL-MCNC: 92 MG/DL (ref 65–99)
HBA1C MFR BLD: 4.4 % (ref 4.8–5.6)
HBV SURFACE AG SERPL QL IA: NORMAL
HCT VFR BLD AUTO: 31.4 % (ref 37.5–51)
HDLC SERPL-MCNC: 40 MG/DL (ref 40–60)
HGB BLD-MCNC: 10 G/DL (ref 13–17.7)
IMM GRANULOCYTES # BLD AUTO: 0.03 10*3/MM3 (ref 0–0.05)
IMM GRANULOCYTES NFR BLD AUTO: 0.4 % (ref 0–0.5)
LDLC SERPL CALC-MCNC: 77 MG/DL (ref 0–100)
LDLC/HDLC SERPL: 1.8 {RATIO}
LYMPHOCYTES # BLD AUTO: 0.64 10*3/MM3 (ref 0.7–3.1)
LYMPHOCYTES NFR BLD AUTO: 8.4 % (ref 19.6–45.3)
MCH RBC QN AUTO: 30.3 PG (ref 26.6–33)
MCHC RBC AUTO-ENTMCNC: 31.8 G/DL (ref 31.5–35.7)
MCV RBC AUTO: 95.2 FL (ref 79–97)
MONOCYTES # BLD AUTO: 0.67 10*3/MM3 (ref 0.1–0.9)
MONOCYTES NFR BLD AUTO: 8.8 % (ref 5–12)
NEUTROPHILS NFR BLD AUTO: 5.86 10*3/MM3 (ref 1.7–7)
NEUTROPHILS NFR BLD AUTO: 77.3 % (ref 42.7–76)
NRBC BLD AUTO-RTO: 0 /100 WBC (ref 0–0.2)
NT-PROBNP SERPL-MCNC: ABNORMAL PG/ML (ref 0–450)
PHOSPHATE SERPL-MCNC: 5.7 MG/DL (ref 2.5–4.5)
PLATELET # BLD AUTO: 185 10*3/MM3 (ref 140–450)
PMV BLD AUTO: 10.2 FL (ref 6–12)
POTASSIUM SERPL-SCNC: 4.2 MMOL/L (ref 3.5–5.2)
PROT SERPL-MCNC: 5.7 G/DL (ref 6–8.5)
RBC # BLD AUTO: 3.3 10*6/MM3 (ref 4.14–5.8)
SODIUM SERPL-SCNC: 135 MMOL/L (ref 136–145)
TRIGL SERPL-MCNC: 165 MG/DL (ref 0–150)
TROPONIN T SERPL-MCNC: 0.04 NG/ML (ref 0–0.03)
TSH SERPL DL<=0.05 MIU/L-ACNC: 3.69 UIU/ML (ref 0.27–4.2)
VLDLC SERPL-MCNC: 28 MG/DL (ref 5–40)
WBC NRBC COR # BLD: 7.58 10*3/MM3 (ref 3.4–10.8)

## 2022-02-02 PROCEDURE — 87340 HEPATITIS B SURFACE AG IA: CPT | Performed by: HOSPITALIST

## 2022-02-02 PROCEDURE — 84443 ASSAY THYROID STIM HORMONE: CPT | Performed by: HOSPITALIST

## 2022-02-02 PROCEDURE — 84484 ASSAY OF TROPONIN QUANT: CPT | Performed by: HOSPITALIST

## 2022-02-02 PROCEDURE — 80053 COMPREHEN METABOLIC PANEL: CPT | Performed by: HOSPITALIST

## 2022-02-02 PROCEDURE — 85025 COMPLETE CBC W/AUTO DIFF WBC: CPT | Performed by: HOSPITALIST

## 2022-02-02 PROCEDURE — 84100 ASSAY OF PHOSPHORUS: CPT | Performed by: HOSPITALIST

## 2022-02-02 PROCEDURE — 80061 LIPID PANEL: CPT | Performed by: HOSPITALIST

## 2022-02-02 PROCEDURE — 83036 HEMOGLOBIN GLYCOSYLATED A1C: CPT | Performed by: HOSPITALIST

## 2022-02-02 PROCEDURE — 83880 ASSAY OF NATRIURETIC PEPTIDE: CPT | Performed by: HOSPITALIST

## 2022-02-02 PROCEDURE — 36415 COLL VENOUS BLD VENIPUNCTURE: CPT | Performed by: HOSPITALIST

## 2022-02-02 PROCEDURE — 5A1D70Z PERFORMANCE OF URINARY FILTRATION, INTERMITTENT, LESS THAN 6 HOURS PER DAY: ICD-10-PCS | Performed by: HOSPITALIST

## 2022-02-02 RX ORDER — ALBUMIN (HUMAN) 12.5 G/50ML
12.5 SOLUTION INTRAVENOUS AS NEEDED
Status: ACTIVE | OUTPATIENT
Start: 2022-02-02 | End: 2022-02-03

## 2022-02-02 RX ORDER — ATORVASTATIN CALCIUM 20 MG/1
10 TABLET, FILM COATED ORAL NIGHTLY
Status: DISCONTINUED | OUTPATIENT
Start: 2022-02-02 | End: 2022-02-04 | Stop reason: HOSPADM

## 2022-02-02 RX ORDER — HYDROXYZINE HYDROCHLORIDE 25 MG/1
25 TABLET, FILM COATED ORAL 3 TIMES DAILY PRN
Status: DISCONTINUED | OUTPATIENT
Start: 2022-02-02 | End: 2022-02-04 | Stop reason: HOSPADM

## 2022-02-02 RX ADMIN — AMLODIPINE BESYLATE 10 MG: 10 TABLET ORAL at 10:27

## 2022-02-02 RX ADMIN — BUPROPION HYDROCHLORIDE 200 MG: 100 TABLET, EXTENDED RELEASE ORAL at 10:24

## 2022-02-02 RX ADMIN — BUPROPION HYDROCHLORIDE 200 MG: 100 TABLET, EXTENDED RELEASE ORAL at 21:03

## 2022-02-02 RX ADMIN — METOPROLOL TARTRATE 25 MG: 25 TABLET ORAL at 21:04

## 2022-02-02 RX ADMIN — METOPROLOL TARTRATE 25 MG: 25 TABLET ORAL at 10:27

## 2022-02-02 RX ADMIN — LAMOTRIGINE 200 MG: 100 TABLET ORAL at 21:03

## 2022-02-02 RX ADMIN — CLOPIDOGREL 75 MG: 75 TABLET, FILM COATED ORAL at 10:32

## 2022-02-02 RX ADMIN — HYDRALAZINE HYDROCHLORIDE 100 MG: 50 TABLET, FILM COATED ORAL at 21:04

## 2022-02-02 RX ADMIN — SEVELAMER CARBONATE 1600 MG: 800 TABLET, FILM COATED ORAL at 12:24

## 2022-02-02 RX ADMIN — SODIUM BICARBONATE 1300 MG: 650 TABLET ORAL at 21:03

## 2022-02-02 RX ADMIN — BUMETANIDE 1 MG: 2 TABLET ORAL at 10:32

## 2022-02-02 RX ADMIN — SEVELAMER CARBONATE 1600 MG: 800 TABLET, FILM COATED ORAL at 18:14

## 2022-02-02 RX ADMIN — ARIPIPRAZOLE 2 MG: 2 TABLET ORAL at 10:24

## 2022-02-02 RX ADMIN — HYDRALAZINE HYDROCHLORIDE 100 MG: 50 TABLET, FILM COATED ORAL at 06:01

## 2022-02-02 RX ADMIN — SEVELAMER CARBONATE 1600 MG: 800 TABLET, FILM COATED ORAL at 08:27

## 2022-02-02 RX ADMIN — SODIUM BICARBONATE 1300 MG: 650 TABLET ORAL at 10:23

## 2022-02-02 RX ADMIN — FLUOXETINE HYDROCHLORIDE 20 MG: 20 CAPSULE ORAL at 10:23

## 2022-02-02 RX ADMIN — DULOXETINE HYDROCHLORIDE 60 MG: 60 CAPSULE, DELAYED RELEASE ORAL at 10:23

## 2022-02-02 RX ADMIN — DULOXETINE HYDROCHLORIDE 60 MG: 60 CAPSULE, DELAYED RELEASE ORAL at 21:03

## 2022-02-02 RX ADMIN — FAMOTIDINE 20 MG: 20 TABLET, FILM COATED ORAL at 21:03

## 2022-02-02 RX ADMIN — ATORVASTATIN CALCIUM 10 MG: 20 TABLET, FILM COATED ORAL at 21:03

## 2022-02-02 RX ADMIN — ASPIRIN 81 MG: 81 TABLET, COATED ORAL at 10:26

## 2022-02-02 RX ADMIN — HYDROXYZINE HYDROCHLORIDE 25 MG: 25 TABLET ORAL at 12:24

## 2022-02-02 RX ADMIN — LAMOTRIGINE 200 MG: 100 TABLET ORAL at 10:25

## 2022-02-02 NOTE — CASE MANAGEMENT/SOCIAL WORK
Discharge Planning Assessment  [facility]     Patient Name: Jai Gayle Jr.  MRN: [id number]  Today's Date: 2/2/2022    Admit Date: 1/31/2022     Discharge Needs Assessment     Row Name 02/02/22 1502       Living Environment    Lives With parent(s)    Current Living Arrangements home/apartment/condo    Primary Care Provided by self    Provides Primary Care For no one    Family Caregiver if Needed parent(s)    Quality of Family Relationships involved; helpful    Able to Return to Prior Arrangements yes       Resource/Environmental Concerns    Resource/Environmental Concerns none       Transition Planning    Patient/Family Anticipates Transition to home    Patient/Family Anticipated Services at Transition none    Transportation Anticipated family or friend will provide       Discharge Needs Assessment    Readmission Within the Last 30 Days no previous admission in last 30 days    Equipment Currently Used at Home none    Equipment Needed After Discharge none    Outpatient/Agency/Support Group Needs outpatient hemodialysis    Provided Post Acute Provider List? N/A               Discharge Plan     Row Name 02/02/22 1502       Plan    Plan home with family; referral for outpatient hemo dialysis faxed to [facility]    Patient/Family in Agreement with Plan yes    Plan Comments Spoke with pt bedside. Confirmed facesheet correct. Explained role of CCP. Pt lives with his father. He is IADLs no DME used. Pt reports he has never used HH or SNF. Verified PCP and pharmacy. Pt reports he plans home; he will need outpatient hemo dialysis at [facility]. He would like closest facility to his home. Faxed referral for dialysis to [facility] [phone]. CCP to follow for chair time.              Continued Care and Services - Admitted Since 1/31/2022    Coordination has not been started for this encounter.          Demographic Summary     Row Name 02/02/22 1508       General Information    Admission Type inpatient    Arrived From  home    Required Notices Provided Important Message from Medicare    Reason for Consult discharge planning    Preferred Language English     Used During This Interaction no       Contact Information    Permission Granted to Share Info With family/designee; facility                Functional Status     Row Name 02/02/22 1502       Functional Status    Usual Activity Tolerance good    Current Activity Tolerance good       Functional Status, IADL    Medications independent    Meal Preparation independent    Housekeeping independent    Laundry independent    Shopping independent       Mental Status    General Appearance WDL WDL       Mental Status Summary    Recent Changes in Mental Status/Cognitive Functioning no changes               Psychosocial    No documentation.                Abuse/Neglect    No documentation.                Legal    No documentation.                Substance Abuse    No documentation.                Patient Forms    No documentation.                   VIANCA Mcclelland

## 2022-02-02 NOTE — PROGRESS NOTES
[facility] Progress Note      Patient Name: Jai Gayle Jr.  : 1980  MRN: [id number]  Primary Care Physician:  Efren Ramos MD  Date of admission: 2022    Subjective     Interval History:   Patient much better.  Has not vomited.  Itching has improved.  He denies any shortness of breath  And no chest pain.  Lower extremity edema has improved    Review of Systems:   As noted above    Objective     Vitals:   Temp:  [97.9 °F (36.6 °C)-98.1 °F (36.7 °C)] 98.1 °F (36.7 °C)  Heart Rate:  [64-69] 67  Resp:  [16-18] 16  BP: (142-162)/(92-96) 154/92    Intake/Output Summary (Last 24 hours) at 2022 0929  Last data filed at 2022 0601  Gross per 24 hour   Intake --   Output 1850 ml   Net -1850 ml       Physical Exam:    General Appearance: alert, oriented x 3, no acute distress   Skin: warm and dry  HEENT: oral mucosa normal, nonicteric sclera  Neck: supple, no JVD  Lungs: CTA  Heart: RRR, normal S1 and S2  Abdomen: soft, nontender, nondistended  : no palpable bladder  Extremities: no edema, cyanosis or clubbing  Neuro: normal speech and mental status   Right upper extremity AV fistula with good thrill  Scheduled Meds:     amLODIPine, 10 mg, Oral, Q24H  ARIPiprazole, 2 mg, Oral, Daily  aspirin, 81 mg, Oral, Daily  atorvastatin, 40 mg, Oral, Nightly  bumetanide, 1 mg, Oral, Daily  buPROPion SR, 200 mg, Oral, BID  clopidogrel, 75 mg, Oral, Daily  DULoxetine, 60 mg, Oral, BID  famotidine, 20 mg, Oral, BID  FLUoxetine, 20 mg, Oral, Daily  hydrALAZINE, 100 mg, Oral, Q8H  lamoTRIgine, 200 mg, Oral, BID  metoprolol tartrate, 25 mg, Oral, Q12H  sevelamer, 1,600 mg, Oral, TID With Meals  sodium bicarbonate, 1,300 mg, Oral, TID      IV Meds:        Results Reviewed:   I have personally reviewed the results from the time of this admission to 2022 09:29 EST     Results from last 7 days   Lab Units 22  0937 22  1831   SODIUM mmol/L 138 135*   POTASSIUM mmol/L  4.1 4.6   CHLORIDE mmol/L 105 105   CO2 mmol/L 19.0* 17.4*   BUN mg/dL 75* 74*   CREATININE mg/dL 7.64* 7.76*   CALCIUM mg/dL 8.9 8.7   BILIRUBIN mg/dL  --  0.2   ALK PHOS U/L  --  71   ALT (SGPT) U/L  --  12   AST (SGOT) U/L  --  17   GLUCOSE mg/dL 102* 84       Estimated Creatinine Clearance: 15.1 mL/min (A) (by C-G formula based on SCr of 7.64 mg/dL (H)).    Results from last 7 days   Lab Units 02/01/22  0937   PHOSPHORUS mg/dL 6.0*             Results from last 7 days   Lab Units 02/01/22  0937 01/31/22  1831   WBC 10*3/mm3 6.97 8.53   HEMOGLOBIN g/dL 10.0* 9.7*   PLATELETS 10*3/mm3 168 194             Assessment / Plan     ASSESSMENT:  1.  CKD stage V with progressive decline I believe now he is ESRD.  Etiology of CKD was related to biopsy-proven diabetic nephrosclerosis with severe interstitial fibrosis.  He does show signs of mild uremic symptoms including nausea and fatigue.  Electrolytes fortunately and volume status are acceptable.  Patient was discontinued last with change his mind.  luckily he has a functional AV fistula and it adaptable from vascular surgery Tuesday.  2.  Hypertension with CKD: Blood pressures elevated.  Will restart home blood pressure medication.  3.  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  4.  Anxiety disorder.  5.  Coronary artery disease status post PTCA on Plavix and aspirin.  6.  Metabolic acidosis on oral bicarbonate.  Labs are pending  7.  Mild hyponatremia likely secondary to hypervolemia.  8.  Hyperphosphatemia on binders.  9.  Anemia of CKD.  Iron studies in favor of iron deficient anemia     PLAN:  Patient preferred to pursue hemodialysis and ultimately home hemodialysis.   Patient has mild renal syndrome including nausea and fatigue.  Will initiate hemodialysis today using 17-gauge needles and attempt to cannulate his AV fistula.  Plan to dialyze daily for next 3 days  We will start IV iron tomorrow with dialysis  Appreciate general surgery and vascular  surgery involvement and recommendation       Thank you for involving us in the care of Jai NIKI Gayle Jr..  Please feel free to call with any questions.    Nick Peña MD  02/02/22  09:29 [facility]    [facility]  [phone]      Much of this encounter note is an electronic transcription/translation of spoken language to printed text. The electronic translation of spoken language may permit erroneous, or at times, nonsensical words or phrases to be inadvertently transcribed; Although I have reviewed the note for such errors, some may still exist.

## 2022-02-02 NOTE — PROGRESS NOTES
"Daily progress note    Chief complaint  Doing better  Awake and alert but confused about hemodialysis versus peritoneal dialysis  No new complaints except itching  Denies any nausea vomiting    History of present illness  41-year-old white male with history of chronic kidney disease stage V and getting close to end-stage renal disease hypertension hyperlipidemia coronary artery disease depression and gastroesophageal reflux disease presented to [facility] emergency room with fatigue tired and itching for last 2 weeks which is getting worse.  Patient denies any fever chills chest pain shortness of breath abdominal pain  vomiting diarrhea.  Patient evaluated in ER and found to be fluid overloaded with uremic symptoms need to start hemodialysis admit for management.  Patient agreeable for hemodialysis today but wants to get a trial dialysis catheter to start peritoneal dialysis as an outpatient.     REVIEW OF SYSTEMS  All systems reviewed and negative except for those discussed in HPI.      PHYSICAL EXAM  Blood pressure 154/92, pulse 67, temperature 98.1 °F (36.7 °C), temperature source Oral, resp. rate 16, height 182.9 cm (72\"), weight 95.3 kg (210 lb), SpO2 99 %.    General: No acute distress.  HENT: NCAT, PERRL, Nares patent.  Eyes: no scleral icterus.  Neck: trachea midline, no ROM limitations.  CV: regular rhythm, regular rate.  Respiratory: normal effort, CTAB.  Abdomen: soft, nondistended, nontender bowel sounds positive  Musculoskeletal: no deformity.  Neuro: alert, moves all extremities, follows commands.  Skin: warm, dry.  AV fistula left upper extremity, no signs of infection.     LAB RESULTS  Lab Results (last 24 hours)     Procedure Component Value Units Date/Time    BNP [[id number]]  (Abnormal) Collected: 02/02/22 0940    Specimen: Blood Updated: 02/02/22 1043     proBNP 44,901.0 pg/mL     Narrative:      Among patients with dyspnea, NT-proBNP is highly sensitive for the detection of acute " congestive heart failure. In addition NT-proBNP of <300 pg/ml effectively rules out acute congestive heart failure with 99% negative predictive value.    Results may be falsely decreased if patient taking Biotin.      Hemoglobin A1c [[id number]]  (Abnormal) Collected: 02/02/22 0940    Specimen: Blood Updated: 02/02/22 1036     Hemoglobin A1C 4.40 %     Narrative:      Hemoglobin A1C Ranges:    Increased Risk for Diabetes  5.7% to 6.4%  Diabetes                     >= 6.5%  Diabetic Goal                < 7.0%    Hepatitis B Surface Antigen [[id number]]  (Normal) Collected: 02/02/22 0940    Specimen: Blood Updated: 02/02/22 1034     Hepatitis B Surface Ag Non-Reactive    TSH [[id number]]  (Normal) Collected: 02/02/22 0940    Specimen: Blood Updated: 02/02/22 1032     TSH 3.690 uIU/mL     Comprehensive Metabolic Panel [[id number]]  (Abnormal) Collected: 02/02/22 0940    Specimen: Blood Updated: 02/02/22 1030     Glucose 92 mg/dL      BUN 70 mg/dL      Creatinine 7.72 mg/dL      Sodium 135 mmol/L      Potassium 4.2 mmol/L      Chloride 104 mmol/L      CO2 19.0 mmol/L      Calcium 8.9 mg/dL      Total Protein 5.7 g/dL      Albumin 2.90 g/dL      ALT (SGPT) 12 U/L      AST (SGOT) 15 U/L      Alkaline Phosphatase 69 U/L      Total Bilirubin <0.2 mg/dL      eGFR Non African Amer 8 mL/min/1.73      Comment: <15 Indicative of kidney failure.        eGFR   Amer --     Comment: <15 Indicative of kidney failure.        Globulin 2.8 gm/dL      A/G Ratio 1.0 g/dL      BUN/Creatinine Ratio 9.1     Anion Gap 12.0 mmol/L     Narrative:      GFR Normal >60  Chronic Kidney Disease <60  Kidney Failure <15      Lipid Panel [[id number]]  (Abnormal) Collected: 02/02/22 0940    Specimen: Blood Updated: 02/02/22 1030     Total Cholesterol 145 mg/dL      Triglycerides 165 mg/dL      HDL Cholesterol 40 mg/dL      LDL Cholesterol  77 mg/dL      VLDL Cholesterol 28 mg/dL      LDL/HDL Ratio 1.80    Narrative:      Cholesterol Reference  Ranges  (U.S. Department of Health and Human Services ATP III Classifications)    Desirable          <200 mg/dL  Borderline High    200-239 mg/dL  High Risk          >240 mg/dL      Triglyceride Reference Ranges  (U.S. Department of Health and Human Services ATP III Classifications)    Normal           <150 mg/dL  Borderline High  150-199 mg/dL  High             200-499 mg/dL  Very High        >500 mg/dL    HDL Reference Ranges  (U.S. Department of Health and Human Services ATP III Classifications)    Low     <40 mg/dl (major risk factor for CHD)  High    >60 mg/dl ('negative' risk factor for CHD)        LDL Reference Ranges  (U.S. Department of Health and Human Services ATP III Classifications)    Optimal          <100 mg/dL  Near Optimal     100-129 mg/dL  Borderline High  130-159 mg/dL  High             160-189 mg/dL  Very High        >189 mg/dL    Phosphorus [[id number]]  (Abnormal) Collected: 02/02/22 0940    Specimen: Blood Updated: 02/02/22 1030     Phosphorus 5.7 mg/dL     CBC & Differential [[id number]]  (Abnormal) Collected: 02/02/22 0940    Specimen: Blood Updated: 02/02/22 1008    Narrative:      The following orders were created for panel order CBC & Differential.  Procedure                               Abnormality         Status                     ---------                               -----------         ------                     CBC Auto Differential[[id number]]        Abnormal            Final result                 Please view results for these tests on the individual orders.    CBC Auto Differential [[id number]]  (Abnormal) Collected: 02/02/22 0940    Specimen: Blood Updated: 02/02/22 1008     WBC 7.58 10*3/mm3      RBC 3.30 10*6/mm3      Hemoglobin 10.0 g/dL      Hematocrit 31.4 %      MCV 95.2 fL      MCH 30.3 pg      MCHC 31.8 g/dL      RDW 12.3 %      RDW-SD 43.5 fl      MPV 10.2 fL      Platelets 185 10*3/mm3      Neutrophil % 77.3 %      Lymphocyte % 8.4 %      Monocyte % 8.8 %       Eosinophil % 4.4 %      Basophil % 0.7 %      Immature Grans % 0.4 %      Neutrophils, Absolute 5.86 10*3/mm3      Lymphocytes, Absolute 0.64 10*3/mm3      Monocytes, Absolute 0.67 10*3/mm3      Eosinophils, Absolute 0.33 10*3/mm3      Basophils, Absolute 0.05 10*3/mm3      Immature Grans, Absolute 0.03 10*3/mm3      nRBC 0.0 /100 WBC     Troponin [[id number]] Collected: 02/02/22 0940    Specimen: Blood Updated: 02/02/22 0955    POC Glucose Once [[id number]]  (Abnormal) Collected: 02/01/22 1637    Specimen: Blood Updated: 02/01/22 1638     Glucose 161 mg/dL      Comment: Meter: [id number] : [id number] Sridhar HUNTER [facility]       Platelet Function ADP [[id number]]  (Abnormal) Collected: 02/01/22 1526    Specimen: Blood Updated: 02/01/22 1537     ADP Aggregation, % Platelet 82 %     POC Glucose Once [[id number]]  (Normal) Collected: 02/01/22 1256    Specimen: Blood Updated: 02/01/22 1258     Glucose 107 mg/dL      Comment: Meter: [id number] : [id number] Iker FROST           Imaging Results (Last 24 Hours)     ** No results found for the last 24 hours. **             ECG 12 Lead  Component   Ref Range & Units 1/31/22 1836 12/13/21 0740   QT Interval   ms 520  460              HEART RATE= 65  bpm  RR Interval= 923  ms  FL Interval= 172  ms  P Horizontal Axis= -11  deg  P Front Axis= 33  deg  QRSD Interval= 111  ms  QT Interval= 520  ms  QRS Axis= 18  deg  T Wave Axis= 78  deg  - ABNORMAL ECG -  Sinus rhythm  Probable left atrial enlargement  Incomplete right bundle branch block  Left ventricular hypertrophy  Prolonged QT interval  When compared with ECG of 13-Dec-2021 7:40:26,  NO SIGNIFICANT CHANGE             Current Facility-Administered Medications:   •  amLODIPine (NORVASC) tablet 10 mg, 10 mg, Oral, Q24H, Nick Peña MD, 10 mg at 02/02/22 1027  •  ARIPiprazole (ABILIFY) tablet 2 mg, 2 mg, Oral, Daily, Mono Roberts MD, 2 mg at 02/02/22 1024  •  aspirin EC tablet 81 mg, 81 mg, Oral, Daily, Armando,  MD Mono, 81 mg at 02/02/22 1026  •  atorvastatin (LIPITOR) tablet 40 mg, 40 mg, Oral, Nightly, Mnoo Roberts MD, 40 mg at 02/01/22 2051  •  bumetanide (BUMEX) tablet 1 mg, 1 mg, Oral, Daily, Nick Peña MD, 1 mg at 02/02/22 1032  •  buPROPion SR (WELLBUTRIN SR) 12 hr tablet 200 mg, 200 mg, Oral, BID, Mono Roberts MD, 200 mg at 02/02/22 1024  •  clopidogrel (PLAVIX) tablet 75 mg, 75 mg, Oral, Daily, Mono Roberts MD, 75 mg at 02/02/22 1032  •  DULoxetine (CYMBALTA) DR capsule 60 mg, 60 mg, Oral, BID, Mono Roberts MD, 60 mg at 02/02/22 1023  •  famotidine (PEPCID) tablet 20 mg, 20 mg, Oral, BID, Mono Roberts MD, 20 mg at 02/01/22 2046  •  FLUoxetine (PROzac) capsule 20 mg, 20 mg, Oral, Daily, Mono Roberts MD, 20 mg at 02/02/22 1023  •  hydrALAZINE (APRESOLINE) tablet 100 mg, 100 mg, Oral, Q8H, Nick Peña MD, 100 mg at 02/02/22 0601  •  hydrOXYzine (ATARAX) tablet 25 mg, 25 mg, Oral, TID PRN, Mono Roberts MD, 25 mg at 02/02/22 1224  •  lamoTRIgine (LaMICtal) tablet 200 mg, 200 mg, Oral, BID, Mono Roberts MD, 200 mg at 02/02/22 1025  •  metoprolol tartrate (LOPRESSOR) tablet 25 mg, 25 mg, Oral, Q12H, Nick Peña MD, 25 mg at 02/02/22 1027  •  sevelamer (RENVELA) tablet 1,600 mg, 1,600 mg, Oral, TID With Meals, Nick Peña MD, 1,600 mg at 02/02/22 1224  •  sodium bicarbonate tablet 1,300 mg, 1,300 mg, Oral, TID, Nick Peña MD, 1,300 mg at 02/02/22 1023     ASSESSMENT  Nausea fatigue tired and eating with history of chronic kidney disease stage V.  Close to end-stage renal disease and need to start hemodialysis but patient would prefer peritoneal dialysis as an outpatient.  Fluid overload  Coronary artery disease  Hypertension  Hyperlipidemia  History of psychosis  Chronic anemia  Gastroesophageal reflux disease    PLAN  CPM  Atarax as needed for itching  Hemodialysis per nephrology  General surgery input appreciated  Continue home medications  Stress ulcer DVT  prophylaxis  Supportive care  Activity as tolerated  Discussed with nursing staff  Follow closely further recommendation current hospital course    TOO PETERSON MD

## 2022-02-02 NOTE — NURSING NOTE
FIRST TIME INITIATION. HD WITHOUT INICDENT OR C/O. TOLERATED WELL. REMOVED 1 L AS ORDERED. VSS THROUGHOUT. NO MEDS ADMINISTERED. AVF NEEDLES REMOVED X 2. HEMOSTASIS ACHIEVED. STABLE, NO C/O UPON COMPLETION OF TREATMENT

## 2022-02-02 NOTE — PLAN OF CARE
Goal Outcome Evaluation:  Plan of Care Reviewed With: patient        Progress: no change  Pt alert and oriented, admitted for ESRD, uremia, generalized fatigue and itching. The itching has been terrible per pt report but happens mostly in evening and at night. Pt on RA, NSR, Reg Renal diet. He has an AV fistula that was placed several weeks ago and is now matured per pt report from Dr. Regan. Will continue to monitor and follow md orders.

## 2022-02-03 LAB
ANION GAP SERPL CALCULATED.3IONS-SCNC: 11 MMOL/L (ref 5–15)
BASOPHILS # BLD AUTO: 0.05 10*3/MM3 (ref 0–0.2)
BASOPHILS NFR BLD AUTO: 0.8 % (ref 0–1.5)
BUN SERPL-MCNC: 52 MG/DL (ref 6–20)
BUN/CREAT SERPL: 9 (ref 7–25)
CALCIUM SPEC-SCNC: 8.6 MG/DL (ref 8.6–10.5)
CHLORIDE SERPL-SCNC: 100 MMOL/L (ref 98–107)
CO2 SERPL-SCNC: 22 MMOL/L (ref 22–29)
CREAT SERPL-MCNC: 5.77 MG/DL (ref 0.76–1.27)
DEPRECATED RDW RBC AUTO: 40.5 FL (ref 37–54)
EOSINOPHIL # BLD AUTO: 0.29 10*3/MM3 (ref 0–0.4)
EOSINOPHIL NFR BLD AUTO: 4.8 % (ref 0.3–6.2)
ERYTHROCYTE [DISTWIDTH] IN BLOOD BY AUTOMATED COUNT: 12.4 % (ref 12.3–15.4)
GFR SERPL CREATININE-BSD FRML MDRD: 11 ML/MIN/1.73
GFR SERPL CREATININE-BSD FRML MDRD: ABNORMAL ML/MIN/{1.73_M2}
GLUCOSE SERPL-MCNC: 79 MG/DL (ref 65–99)
HCT VFR BLD AUTO: 27.3 % (ref 37.5–51)
HGB BLD-MCNC: 9.1 G/DL (ref 13–17.7)
IMM GRANULOCYTES # BLD AUTO: 0.02 10*3/MM3 (ref 0–0.05)
IMM GRANULOCYTES NFR BLD AUTO: 0.3 % (ref 0–0.5)
LYMPHOCYTES # BLD AUTO: 0.52 10*3/MM3 (ref 0.7–3.1)
LYMPHOCYTES NFR BLD AUTO: 8.6 % (ref 19.6–45.3)
MCH RBC QN AUTO: 29.9 PG (ref 26.6–33)
MCHC RBC AUTO-ENTMCNC: 33.3 G/DL (ref 31.5–35.7)
MCV RBC AUTO: 89.8 FL (ref 79–97)
MONOCYTES # BLD AUTO: 0.67 10*3/MM3 (ref 0.1–0.9)
MONOCYTES NFR BLD AUTO: 11.1 % (ref 5–12)
NEUTROPHILS NFR BLD AUTO: 4.51 10*3/MM3 (ref 1.7–7)
NEUTROPHILS NFR BLD AUTO: 74.4 % (ref 42.7–76)
NRBC BLD AUTO-RTO: 0 /100 WBC (ref 0–0.2)
PLATELET # BLD AUTO: 165 10*3/MM3 (ref 140–450)
PMV BLD AUTO: 10.3 FL (ref 6–12)
POTASSIUM SERPL-SCNC: 4.2 MMOL/L (ref 3.5–5.2)
RBC # BLD AUTO: 3.04 10*6/MM3 (ref 4.14–5.8)
SODIUM SERPL-SCNC: 133 MMOL/L (ref 136–145)
WBC NRBC COR # BLD: 6.06 10*3/MM3 (ref 3.4–10.8)

## 2022-02-03 PROCEDURE — 5A1D70Z PERFORMANCE OF URINARY FILTRATION, INTERMITTENT, LESS THAN 6 HOURS PER DAY: ICD-10-PCS | Performed by: HOSPITALIST

## 2022-02-03 PROCEDURE — 80048 BASIC METABOLIC PNL TOTAL CA: CPT | Performed by: HOSPITALIST

## 2022-02-03 PROCEDURE — 85025 COMPLETE CBC W/AUTO DIFF WBC: CPT | Performed by: HOSPITALIST

## 2022-02-03 RX ORDER — ALBUMIN (HUMAN) 12.5 G/50ML
12.5 SOLUTION INTRAVENOUS AS NEEDED
Status: DISCONTINUED | OUTPATIENT
Start: 2022-02-03 | End: 2022-02-04 | Stop reason: HOSPADM

## 2022-02-03 RX ADMIN — HYDRALAZINE HYDROCHLORIDE 100 MG: 50 TABLET, FILM COATED ORAL at 21:50

## 2022-02-03 RX ADMIN — BUMETANIDE 1 MG: 2 TABLET ORAL at 08:11

## 2022-02-03 RX ADMIN — DULOXETINE HYDROCHLORIDE 60 MG: 60 CAPSULE, DELAYED RELEASE ORAL at 21:50

## 2022-02-03 RX ADMIN — ASPIRIN 81 MG: 81 TABLET, COATED ORAL at 08:11

## 2022-02-03 RX ADMIN — BUPROPION HYDROCHLORIDE 200 MG: 100 TABLET, EXTENDED RELEASE ORAL at 08:11

## 2022-02-03 RX ADMIN — DULOXETINE HYDROCHLORIDE 60 MG: 60 CAPSULE, DELAYED RELEASE ORAL at 08:11

## 2022-02-03 RX ADMIN — FLUOXETINE HYDROCHLORIDE 20 MG: 20 CAPSULE ORAL at 08:11

## 2022-02-03 RX ADMIN — AMLODIPINE BESYLATE 10 MG: 10 TABLET ORAL at 08:11

## 2022-02-03 RX ADMIN — LAMOTRIGINE 200 MG: 100 TABLET ORAL at 22:24

## 2022-02-03 RX ADMIN — FAMOTIDINE 20 MG: 20 TABLET, FILM COATED ORAL at 08:11

## 2022-02-03 RX ADMIN — SODIUM BICARBONATE 1300 MG: 650 TABLET ORAL at 22:24

## 2022-02-03 RX ADMIN — LAMOTRIGINE 200 MG: 100 TABLET ORAL at 08:11

## 2022-02-03 RX ADMIN — SODIUM BICARBONATE 1300 MG: 650 TABLET ORAL at 17:44

## 2022-02-03 RX ADMIN — CLOPIDOGREL 75 MG: 75 TABLET, FILM COATED ORAL at 08:11

## 2022-02-03 RX ADMIN — HYDRALAZINE HYDROCHLORIDE 100 MG: 50 TABLET, FILM COATED ORAL at 06:23

## 2022-02-03 RX ADMIN — METOPROLOL TARTRATE 25 MG: 25 TABLET ORAL at 21:50

## 2022-02-03 RX ADMIN — ATORVASTATIN CALCIUM 10 MG: 20 TABLET, FILM COATED ORAL at 21:50

## 2022-02-03 RX ADMIN — BUPROPION HYDROCHLORIDE 200 MG: 100 TABLET, EXTENDED RELEASE ORAL at 22:24

## 2022-02-03 RX ADMIN — METOPROLOL TARTRATE 25 MG: 25 TABLET ORAL at 08:11

## 2022-02-03 RX ADMIN — HYDRALAZINE HYDROCHLORIDE 100 MG: 50 TABLET, FILM COATED ORAL at 14:29

## 2022-02-03 RX ADMIN — SEVELAMER CARBONATE 1600 MG: 800 TABLET, FILM COATED ORAL at 08:11

## 2022-02-03 RX ADMIN — ARIPIPRAZOLE 2 MG: 2 TABLET ORAL at 17:44

## 2022-02-03 RX ADMIN — SODIUM BICARBONATE 1300 MG: 650 TABLET ORAL at 08:11

## 2022-02-03 RX ADMIN — FAMOTIDINE 20 MG: 20 TABLET, FILM COATED ORAL at 21:50

## 2022-02-03 NOTE — CASE MANAGEMENT/SOCIAL WORK
Continued Stay Note  [facility]     Patient Name: Jai Gayle Jr.  MRN: [id number]  Today's Date: 2/3/2022    Admit Date: 1/31/2022     Discharge Plan     Row Name 02/03/22 0937       Plan    Plan Home with father.  CCP working on arranging outpt dialysis.    Plan Comments [facility] - they have received the referral and are looking at his clinical info.  She will notify CCP once a clinic/chair time has been arranged.  Pt noitifed of updates. He confirms plan to return home with his father upon DC, and is ambulating without difficulty. ..........sk               Discharge Codes    No documentation.                     Yvonne Gleason RN

## 2022-02-03 NOTE — PROGRESS NOTES
[facility] Progress Note      Patient Name: Jai Gayle Jr.  : 1980  MRN: [id number]  Primary Care Physician:  Efren aRmos MD  Date of admission: 2022    Subjective     Interval History:   Patient doing much better.  Denies any nausea no vomiting.  No fever no chills.  No chest pain.  He is afebrile.  It is a second day on dialysis.  Nausea has improved dramatically.  Working outpatient placement    Review of Systems:   As noted above    Objective     Vitals:   Temp:  [97.3 °F (36.3 °C)-99 °F (37.2 °C)] 98 °F (36.7 °C)  Heart Rate:  [60-71] 60  Resp:  [16-18] 16  BP: (125-164)/(80-93) 164/93    Intake/Output Summary (Last 24 hours) at 2/3/2022 1208  Last data filed at 2/3/2022 0737  Gross per 24 hour   Intake 1300 ml   Output 1900 ml   Net -600 ml       Physical Exam:    General Appearance: alert, oriented x 3, no acute distress   Skin: warm and dry  HEENT: oral mucosa normal, nonicteric sclera  Neck: supple, no JVD  Lungs: CTA  Heart: RRR, normal S1 and S2  Abdomen: soft, nontender, nondistended  : no palpable bladder  Extremities: no edema, cyanosis or clubbing  Neuro: normal speech and mental status   Right upper extremity AV fistula with good thrill  Scheduled Meds:     amLODIPine, 10 mg, Oral, Q24H  ARIPiprazole, 2 mg, Oral, Daily  aspirin, 81 mg, Oral, Daily  atorvastatin, 10 mg, Oral, Nightly  bumetanide, 1 mg, Oral, Daily  buPROPion SR, 200 mg, Oral, BID  clopidogrel, 75 mg, Oral, Daily  DULoxetine, 60 mg, Oral, BID  famotidine, 20 mg, Oral, BID  FLUoxetine, 20 mg, Oral, Daily  hydrALAZINE, 100 mg, Oral, Q8H  lamoTRIgine, 200 mg, Oral, BID  metoprolol tartrate, 25 mg, Oral, Q12H  sevelamer, 1,600 mg, Oral, TID With Meals  sodium bicarbonate, 1,300 mg, Oral, TID      IV Meds:        Results Reviewed:   I have personally reviewed the results from the time of this admission to 2/3/2022 12:08 EST     Results from last 7 days   Lab Units 22  0791  02/02/22  0940 02/01/22  0937 01/31/22  1831 01/31/22  1831   SODIUM mmol/L 133* 135* 138   < > 135*   POTASSIUM mmol/L 4.2 4.2 4.1   < > 4.6   CHLORIDE mmol/L 100 104 105   < > 105   CO2 mmol/L 22.0 19.0* 19.0*   < > 17.4*   BUN mg/dL 52* 70* 75*   < > 74*   CREATININE mg/dL 5.77* 7.72* 7.64*   < > 7.76*   CALCIUM mg/dL 8.6 8.9 8.9   < > 8.7   BILIRUBIN mg/dL  --  <0.2  --   --  0.2   ALK PHOS U/L  --  69  --   --  71   ALT (SGPT) U/L  --  12  --   --  12   AST (SGOT) U/L  --  15  --   --  17   GLUCOSE mg/dL 79 92 102*   < > 84    < > = values in this interval not displayed.       Estimated Creatinine Clearance: 21.8 mL/min (A) (by C-G formula based on SCr of 5.77 mg/dL (H)).    Results from last 7 days   Lab Units 02/02/22  0940 02/01/22  0937   PHOSPHORUS mg/dL 5.7* 6.0*             Results from last 7 days   Lab Units 02/03/22  0717 02/02/22  0940 02/01/22  0937 01/31/22  1831   WBC 10*3/mm3 6.06 7.58 6.97 8.53   HEMOGLOBIN g/dL 9.1* 10.0* 10.0* 9.7*   PLATELETS 10*3/mm3 165 185 168 194             Assessment / Plan     ASSESSMENT:  1.  CKD stage V with progressive decline I believe now he is ESRD.  Etiology of CKD was related to biopsy-proven diabetic nephrosclerosis with severe interstitial fibrosis.  He does show signs of mild uremic symptoms including nausea and fatigue.  Electrolytes fortunately and volume status are acceptable.  Patient was discontinued last with change his mind.  luckily he has a functional AV fistula and it adaptable from vascular surgery Tuesday.  2.  Hypertension with CKD: Blood pressures elevated.  Will restart home blood pressure medication.  3.  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  4.  Anxiety disorder.  5.  Coronary artery disease status post PTCA on Plavix and aspirin.  6.  Metabolic acidosis on oral bicarbonate.  Labs are pending  7.  Mild hyponatremia likely secondary to hypervolemia.  8.  Hyperphosphatemia on binders.  Phosphorus improving  9.  Anemia of CKD.   Iron studies in favor of iron deficient anemia     PLAN:    Dialyzed today for second time.  His dialysis has been uneventful.  We will dialyze again tomorrow.   working on placement.  Surveillance labs        Thank you for involving us in the care of Jai Gayle Jr..  Please feel free to call with any questions.    Nick Peña MD  02/03/22  12:08 [facility]    [facility]  [phone]      Much of this encounter note is an electronic transcription/translation of spoken language to printed text. The electronic translation of spoken language may permit erroneous, or at times, nonsensical words or phrases to be inadvertently transcribed; Although I have reviewed the note for such errors, some may still exist.

## 2022-02-03 NOTE — PROGRESS NOTES
"Daily progress note    Chief complaint  Doing same  No new complaints   Denies any nausea or itching    History of present illness  41-year-old white male with history of chronic kidney disease stage V and getting close to end-stage renal disease hypertension hyperlipidemia coronary artery disease depression and gastroesophageal reflux disease presented to [facility] emergency room with fatigue tired and itching for last 2 weeks which is getting worse.  Patient denies any fever chills chest pain shortness of breath abdominal pain  vomiting diarrhea.  Patient evaluated in ER and found to be fluid overloaded with uremic symptoms need to start hemodialysis admit for management.  Patient agreeable for hemodialysis today but wants to get a trial dialysis catheter to start peritoneal dialysis as an outpatient.     REVIEW OF SYSTEMS  Unremarkable     PHYSICAL EXAM  Blood pressure 164/93, pulse 60, temperature 98 °F (36.7 °C), temperature source Temporal, resp. rate 16, height 182.9 cm (72\"), weight 92.4 kg (203 lb 12.8 oz), SpO2 97 %.    General: No acute distress.  HENT: NCAT, PERRL, Nares patent.  Eyes: no scleral icterus.  Neck: trachea midline, no ROM limitations.  CV: regular rhythm, regular rate.  Respiratory: normal effort, CTAB.  Abdomen: soft, nondistended, nontender bowel sounds positive  Musculoskeletal: no deformity.  Neuro: alert, moves all extremities, follows commands.  Skin: warm, dry.  AV fistula left upper extremity, no signs of infection.     LAB RESULTS  Lab Results (last 24 hours)     Procedure Component Value Units Date/Time    Basic Metabolic Panel [[id number]]  (Abnormal) Collected: 02/03/22 0717    Specimen: Blood Updated: 02/03/22 0812     Glucose 79 mg/dL      BUN 52 mg/dL      Creatinine 5.77 mg/dL      Sodium 133 mmol/L      Potassium 4.2 mmol/L      Chloride 100 mmol/L      CO2 22.0 mmol/L      Calcium 8.6 mg/dL      eGFR   Amer --     Comment: <15 Indicative of kidney " failure.        eGFR Non African Amer 11 mL/min/1.73      Comment: <15 Indicative of kidney failure.        BUN/Creatinine Ratio 9.0     Anion Gap 11.0 mmol/L     Narrative:      GFR Normal >60  Chronic Kidney Disease <60  Kidney Failure <15      CBC & Differential [[id number]]  (Abnormal) Collected: 02/03/22 0717    Specimen: Blood Updated: 02/03/22 0803    Narrative:      The following orders were created for panel order CBC & Differential.  Procedure                               Abnormality         Status                     ---------                               -----------         ------                     CBC Auto Differential[[id number]]        Abnormal            Final result                 Please view results for these tests on the individual orders.    CBC Auto Differential [[id number]]  (Abnormal) Collected: 02/03/22 0717    Specimen: Blood Updated: 02/03/22 0803     WBC 6.06 10*3/mm3      RBC 3.04 10*6/mm3      Hemoglobin 9.1 g/dL      Hematocrit 27.3 %      MCV 89.8 fL      MCH 29.9 pg      MCHC 33.3 g/dL      RDW 12.4 %      RDW-SD 40.5 fl      MPV 10.3 fL      Platelets 165 10*3/mm3      Neutrophil % 74.4 %      Lymphocyte % 8.6 %      Monocyte % 11.1 %      Eosinophil % 4.8 %      Basophil % 0.8 %      Immature Grans % 0.3 %      Neutrophils, Absolute 4.51 10*3/mm3      Lymphocytes, Absolute 0.52 10*3/mm3      Monocytes, Absolute 0.67 10*3/mm3      Eosinophils, Absolute 0.29 10*3/mm3      Basophils, Absolute 0.05 10*3/mm3      Immature Grans, Absolute 0.02 10*3/mm3      nRBC 0.0 /100 WBC     Troponin [[id number]]  (Abnormal) Collected: 02/02/22 0940    Specimen: Blood Updated: 02/02/22 1427     Troponin T 0.035 ng/mL     Narrative:      Troponin T Reference Range:  <= 0.03 ng/mL-   Negative for AMI  >0.03 ng/mL-     Abnormal for myocardial necrosis.  Clinicians would have to utilize clinical acumen, EKG, Troponin and serial changes to determine if it is an Acute Myocardial Infarction or myocardial  injury due to an underlying chronic condition.       Results may be falsely decreased if patient taking Biotin.          Imaging Results (Last 24 Hours)     ** No results found for the last 24 hours. **             ECG 12 Lead  Component   Ref Range & Units 1/31/22 1836 12/13/21 0740   QT Interval   ms 520  460              HEART RATE= 65  bpm  RR Interval= 923  ms  SC Interval= 172  ms  P Horizontal Axis= -11  deg  P Front Axis= 33  deg  QRSD Interval= 111  ms  QT Interval= 520  ms  QRS Axis= 18  deg  T Wave Axis= 78  deg  - ABNORMAL ECG -  Sinus rhythm  Probable left atrial enlargement  Incomplete right bundle branch block  Left ventricular hypertrophy  Prolonged QT interval  When compared with ECG of 13-Dec-2021 7:40:26,  NO SIGNIFICANT CHANGE             Current Facility-Administered Medications:   •  albumin human 25 % IV SOLN 12.5 g, 12.5 g, Intravenous, PRN, Nick Peña MD  •  albumin human 25 % IV SOLN 12.5 g, 12.5 g, Intravenous, PRN, Nick Peña MD  •  amLODIPine (NORVASC) tablet 10 mg, 10 mg, Oral, Q24H, Nick Peña MD, 10 mg at 02/03/22 0811  •  ARIPiprazole (ABILIFY) tablet 2 mg, 2 mg, Oral, Daily, Mono Roberts MD, 2 mg at 02/02/22 1024  •  aspirin EC tablet 81 mg, 81 mg, Oral, Daily, Mono Roberts MD, 81 mg at 02/03/22 0811  •  atorvastatin (LIPITOR) tablet 10 mg, 10 mg, Oral, Nightly, Mono Roberts MD, 10 mg at 02/02/22 2103  •  bumetanide (BUMEX) tablet 1 mg, 1 mg, Oral, Daily, Nick Peña MD, 1 mg at 02/03/22 0811  •  buPROPion SR (WELLBUTRIN SR) 12 hr tablet 200 mg, 200 mg, Oral, BID, Mono Roberts MD, 200 mg at 02/03/22 0811  •  clopidogrel (PLAVIX) tablet 75 mg, 75 mg, Oral, Daily, Mono Roberts MD, 75 mg at 02/03/22 0811  •  DULoxetine (CYMBALTA) DR capsule 60 mg, 60 mg, Oral, BID, Mono Roberts MD, 60 mg at 02/03/22 0811  •  famotidine (PEPCID) tablet 20 mg, 20 mg, Oral, BID, Mono Roberts MD, 20 mg at 02/03/22 0811  •  FLUoxetine (PROzac) capsule 20 mg, 20 mg,  Oral, Daily, Too Roberts MD, 20 mg at 02/03/22 0811  •  hydrALAZINE (APRESOLINE) tablet 100 mg, 100 mg, Oral, Q8H, Nick Peña MD, 100 mg at 02/03/22 0623  •  hydrOXYzine (ATARAX) tablet 25 mg, 25 mg, Oral, TID PRN, Too Roberts MD, 25 mg at 02/02/22 1224  •  lamoTRIgine (LaMICtal) tablet 200 mg, 200 mg, Oral, BID, Too Roberts MD, 200 mg at 02/03/22 0811  •  metoprolol tartrate (LOPRESSOR) tablet 25 mg, 25 mg, Oral, Q12H, Nick Peña MD, 25 mg at 02/03/22 0811  •  sevelamer (RENVELA) tablet 1,600 mg, 1,600 mg, Oral, TID With Meals, Nick Peña MD, 1,600 mg at 02/03/22 0811  •  sodium bicarbonate tablet 1,300 mg, 1,300 mg, Oral, TID, Nick Peña MD, 1,300 mg at 02/03/22 0811     ASSESSMENT  New end-stage renal disease on hemodialysis  S/p fluid overload  Coronary artery disease  Hypertension  Hyperlipidemia  History of psychosis  Chronic anemia  Gastroesophageal reflux disease    PLAN  CPM  Medically stable  Hemodialysis TIW  General surgery input appreciated  Continue home medications  Stress ulcer DVT prophylaxis  Supportive care  Activity as tolerated  Discussed with nursing staff  Discharge once outpatient hemodialysis spot available    TOO ROBERTS MD

## 2022-02-03 NOTE — NURSING NOTE
HD WITHOUT INCIDENT OR C/O. TOLERATED WELL. REMOVED 1.2 L AS ORDERED. NO MEDS ADMINISTERED. AVF NEEDLES REMOVED X 2. HEMOSTASIS ACHIEVED.  STABLE, NO C/O UPON COMPLETION OF TREATMENT.

## 2022-02-04 ENCOUNTER — APPOINTMENT (OUTPATIENT)
Dept: PREADMISSION TESTING | Facility: HOSPITAL | Age: 42
End: 2022-02-04

## 2022-02-04 VITALS
WEIGHT: 199.3 LBS | HEIGHT: 72 IN | TEMPERATURE: 98 F | OXYGEN SATURATION: 95 % | DIASTOLIC BLOOD PRESSURE: 91 MMHG | HEART RATE: 69 BPM | SYSTOLIC BLOOD PRESSURE: 148 MMHG | RESPIRATION RATE: 16 BRPM | BODY MASS INDEX: 26.99 KG/M2

## 2022-02-04 LAB
ANION GAP SERPL CALCULATED.3IONS-SCNC: 11 MMOL/L (ref 5–15)
BASOPHILS # BLD AUTO: 0.05 10*3/MM3 (ref 0–0.2)
BASOPHILS NFR BLD AUTO: 0.8 % (ref 0–1.5)
BUN SERPL-MCNC: 45 MG/DL (ref 6–20)
BUN/CREAT SERPL: 7.9 (ref 7–25)
CALCIUM SPEC-SCNC: 8.4 MG/DL (ref 8.6–10.5)
CHLORIDE SERPL-SCNC: 100 MMOL/L (ref 98–107)
CO2 SERPL-SCNC: 22 MMOL/L (ref 22–29)
CREAT SERPL-MCNC: 5.7 MG/DL (ref 0.76–1.27)
DEPRECATED RDW RBC AUTO: 40.8 FL (ref 37–54)
EOSINOPHIL # BLD AUTO: 0.28 10*3/MM3 (ref 0–0.4)
EOSINOPHIL NFR BLD AUTO: 4.3 % (ref 0.3–6.2)
ERYTHROCYTE [DISTWIDTH] IN BLOOD BY AUTOMATED COUNT: 12.2 % (ref 12.3–15.4)
GFR SERPL CREATININE-BSD FRML MDRD: 11 ML/MIN/1.73
GFR SERPL CREATININE-BSD FRML MDRD: ABNORMAL ML/MIN/{1.73_M2}
GLUCOSE SERPL-MCNC: 86 MG/DL (ref 65–99)
HCT VFR BLD AUTO: 28.6 % (ref 37.5–51)
HGB BLD-MCNC: 9.5 G/DL (ref 13–17.7)
IMM GRANULOCYTES # BLD AUTO: 0.01 10*3/MM3 (ref 0–0.05)
IMM GRANULOCYTES NFR BLD AUTO: 0.2 % (ref 0–0.5)
LYMPHOCYTES # BLD AUTO: 0.67 10*3/MM3 (ref 0.7–3.1)
LYMPHOCYTES NFR BLD AUTO: 10.3 % (ref 19.6–45.3)
MCH RBC QN AUTO: 30.5 PG (ref 26.6–33)
MCHC RBC AUTO-ENTMCNC: 33.2 G/DL (ref 31.5–35.7)
MCV RBC AUTO: 92 FL (ref 79–97)
MONOCYTES # BLD AUTO: 0.79 10*3/MM3 (ref 0.1–0.9)
MONOCYTES NFR BLD AUTO: 12.1 % (ref 5–12)
NEUTROPHILS NFR BLD AUTO: 4.71 10*3/MM3 (ref 1.7–7)
NEUTROPHILS NFR BLD AUTO: 72.3 % (ref 42.7–76)
NRBC BLD AUTO-RTO: 0 /100 WBC (ref 0–0.2)
PLATELET # BLD AUTO: 156 10*3/MM3 (ref 140–450)
PMV BLD AUTO: 10.3 FL (ref 6–12)
POTASSIUM SERPL-SCNC: 4.3 MMOL/L (ref 3.5–5.2)
RBC # BLD AUTO: 3.11 10*6/MM3 (ref 4.14–5.8)
SODIUM SERPL-SCNC: 133 MMOL/L (ref 136–145)
WBC NRBC COR # BLD: 6.51 10*3/MM3 (ref 3.4–10.8)

## 2022-02-04 PROCEDURE — 5A1D70Z PERFORMANCE OF URINARY FILTRATION, INTERMITTENT, LESS THAN 6 HOURS PER DAY: ICD-10-PCS | Performed by: HOSPITALIST

## 2022-02-04 PROCEDURE — 80048 BASIC METABOLIC PNL TOTAL CA: CPT | Performed by: HOSPITALIST

## 2022-02-04 PROCEDURE — 85025 COMPLETE CBC W/AUTO DIFF WBC: CPT | Performed by: HOSPITALIST

## 2022-02-04 RX ADMIN — HYDRALAZINE HYDROCHLORIDE 100 MG: 50 TABLET, FILM COATED ORAL at 05:38

## 2022-02-04 RX ADMIN — BUPROPION HYDROCHLORIDE 200 MG: 100 TABLET, EXTENDED RELEASE ORAL at 09:20

## 2022-02-04 RX ADMIN — FLUOXETINE HYDROCHLORIDE 20 MG: 20 CAPSULE ORAL at 09:20

## 2022-02-04 RX ADMIN — ASPIRIN 81 MG: 81 TABLET, COATED ORAL at 09:20

## 2022-02-04 RX ADMIN — CLOPIDOGREL 75 MG: 75 TABLET, FILM COATED ORAL at 09:20

## 2022-02-04 RX ADMIN — SODIUM BICARBONATE 1300 MG: 650 TABLET ORAL at 09:20

## 2022-02-04 RX ADMIN — FAMOTIDINE 20 MG: 20 TABLET, FILM COATED ORAL at 09:20

## 2022-02-04 RX ADMIN — ARIPIPRAZOLE 2 MG: 2 TABLET ORAL at 09:21

## 2022-02-04 RX ADMIN — BUMETANIDE 1 MG: 2 TABLET ORAL at 09:20

## 2022-02-04 RX ADMIN — DULOXETINE HYDROCHLORIDE 60 MG: 60 CAPSULE, DELAYED RELEASE ORAL at 09:20

## 2022-02-04 RX ADMIN — LAMOTRIGINE 200 MG: 100 TABLET ORAL at 09:20

## 2022-02-04 RX ADMIN — METOPROLOL TARTRATE 25 MG: 25 TABLET ORAL at 09:21

## 2022-02-04 RX ADMIN — HYDRALAZINE HYDROCHLORIDE 100 MG: 50 TABLET, FILM COATED ORAL at 14:35

## 2022-02-04 RX ADMIN — SEVELAMER CARBONATE 1600 MG: 800 TABLET, FILM COATED ORAL at 09:21

## 2022-02-04 RX ADMIN — AMLODIPINE BESYLATE 10 MG: 10 TABLET ORAL at 14:38

## 2022-02-04 NOTE — NURSING NOTE
"Patient wanting to go home, Rn spoke with  and dialysis is not fully set up yet. Patient advised he is unable to go home until dialysis is confirmed. Pt refusing to put back on monitor. RN notified, rn and ccp to bedside. Pt notified he is not fully covered to discharge until outpatient dialysis is scheduled. Patient insistent he is, \"medically cleared\" based off what [facility] said on a phone call. RN and  assurred pt he does not yet have dialysis fully scheduled and is advised not to leave. Pt continuing to refuse monitor and care.    Pt pulled out IV and has signed AMA. Outpatient dialysis info set up and given to patient before leaving ama. Uziel and MD bagley notified.  "

## 2022-02-04 NOTE — PROGRESS NOTES
[facility] Progress Note      Patient Name: Jai Gayle Jr.  : 1980  MRN: [id number]  Primary Care Physician:  Efren Ramos MD  Date of admission: 2022    Subjective     Interval History:   Patient doing very well on dialysis.  Dialyzed today and were able to remove 2 L of fluid.    Review of Systems:   As noted above    Objective     Vitals:   Temp:  [98 °F (36.7 °C)-98.8 °F (37.1 °C)] 98 °F (36.7 °C)  Heart Rate:  [69-70] 69  Resp:  [16-18] 16  BP: (123-148)/(79-91) 148/91    Intake/Output Summary (Last 24 hours) at 2022 1539  Last data filed at 2022 1300  Gross per 24 hour   Intake 760 ml   Output 2575 ml   Net -1815 ml       Physical Exam:    General Appearance: alert, oriented x 3, no acute distress   Skin: warm and dry  HEENT: oral mucosa normal, nonicteric sclera  Neck: supple, no JVD  Lungs: CTA  Heart: RRR, normal S1 and S2  Abdomen: soft, nontender, nondistended  : no palpable bladder  Extremities: no edema, cyanosis or clubbing  Neuro: normal speech and mental status   Right upper extremity AV fistula with good thrill      Scheduled Meds:     amLODIPine, 10 mg, Oral, Q24H  ARIPiprazole, 2 mg, Oral, Daily  aspirin, 81 mg, Oral, Daily  atorvastatin, 10 mg, Oral, Nightly  bumetanide, 1 mg, Oral, Daily  buPROPion SR, 200 mg, Oral, BID  clopidogrel, 75 mg, Oral, Daily  DULoxetine, 60 mg, Oral, BID  famotidine, 20 mg, Oral, BID  FLUoxetine, 20 mg, Oral, Daily  hydrALAZINE, 100 mg, Oral, Q8H  lamoTRIgine, 200 mg, Oral, BID  metoprolol tartrate, 25 mg, Oral, Q12H  sevelamer, 1,600 mg, Oral, TID With Meals  sodium bicarbonate, 1,300 mg, Oral, TID      IV Meds:        Results Reviewed:   I have personally reviewed the results from the time of this admission to 2022 15:39 EST     Results from last 7 days   Lab Units 22  0601 22  0717 22  0940 22  0937 22  1831   SODIUM mmol/L 133* 133* 135*   < > 135*   POTASSIUM  mmol/L 4.3 4.2 4.2   < > 4.6   CHLORIDE mmol/L 100 100 104   < > 105   CO2 mmol/L 22.0 22.0 19.0*   < > 17.4*   BUN mg/dL 45* 52* 70*   < > 74*   CREATININE mg/dL 5.70* 5.77* 7.72*   < > 7.76*   CALCIUM mg/dL 8.4* 8.6 8.9   < > 8.7   BILIRUBIN mg/dL  --   --  <0.2  --  0.2   ALK PHOS U/L  --   --  69  --  71   ALT (SGPT) U/L  --   --  12  --  12   AST (SGOT) U/L  --   --  15  --  17   GLUCOSE mg/dL 86 79 92   < > 84    < > = values in this interval not displayed.       Estimated Creatinine Clearance: 21.6 mL/min (A) (by C-G formula based on SCr of 5.7 mg/dL (H)).    Results from last 7 days   Lab Units 02/02/22  0940 02/01/22  0937   PHOSPHORUS mg/dL 5.7* 6.0*             Results from last 7 days   Lab Units 02/04/22  0601 02/03/22  0717 02/02/22  0940 02/01/22  0937 01/31/22  1831   WBC 10*3/mm3 6.51 6.06 7.58 6.97 8.53   HEMOGLOBIN g/dL 9.5* 9.1* 10.0* 10.0* 9.7*   PLATELETS 10*3/mm3 156 165 185 168 194             Assessment / Plan     ASSESSMENT:  1.   ESRD .  Etiology of CKD was related to biopsy-proven diabetic nephrosclerosis with severe interstitial fibrosis.  Initiated on dialysis 3 days ago and this is third treatment on dialysis using left upper extremity AV fistula    2.  Hypertension with CKD: Blood pressures elevated.  Will restart home blood pressure medication.  3.  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  4.  Anxiety disorder.  5.  Coronary artery disease status post PTCA on Plavix and aspirin.  6.  Metabolic acidosis on oral bicarbonate.  Labs are pending  7.  Mild hyponatremia likely secondary to hypervolemia.  8.  Hyperphosphatemia on binders.  Phosphorus improving  9.  Anemia of CKD.  Iron studies in favor of iron deficient anemia     PLAN:    Dialyzed today for the third time.  Awaiting for placement  Plan to continue dialysis on Monday Wednesday Friday  Surveillance labs          Thank you for involving us in the care of Jai Gayle Jr..  Please feel free to call with any  questions.    Nick Peña MD  02/04/22  15:39 EST    [facility]  [phone]      Much of this encounter note is an electronic transcription/translation of spoken language to printed text. The electronic translation of spoken language may permit erroneous, or at times, nonsensical words or phrases to be inadvertently transcribed; Although I have reviewed the note for such errors, some may still exist.

## 2022-02-04 NOTE — CASE MANAGEMENT/SOCIAL WORK
Continued Stay Note  [facility]     Patient Name: Jai Gayle Jr.  MRN: [id number]  Today's Date: 2/4/2022    Admit Date: 1/31/2022     Discharge Plan     Row Name 02/04/22 1759       Plan    Plan Comments S/W Suzi/ Gisella - pt has been accepted and  chairtime has been arranged - [facility] - [address].  First appointment is Monday 2/7/22 @ 1050AM.  Clinic address, contact # and schedule given to pt. .........sk               Discharge Codes    No documentation.               Expected Discharge Date and Time     Expected Discharge Date Expected Discharge Time    Feb 5, 2022             Yvonne Gleason RN

## 2022-02-04 NOTE — CASE MANAGEMENT/SOCIAL WORK
Continued Stay Note  [facility]     Patient Name: Jai Gayle Jr.  MRN: [id number]  Today's Date: 2/4/2022    Admit Date: 1/31/2022     Discharge Plan     Row Name 02/04/22 0857       Plan    Plan Home with father.  CCP working on arranging outpt HD.    Plan Comments S/W pt at bedside who states he does not have insurance.  His father (Jai Gayle Sr.)  has Medicare.  Message left w/ [facility] requesting to eval pt.  CCP did notify Suzi/ Gisella and she will followup after [facility] eval. ..........sk               Discharge Codes    No documentation.               Expected Discharge Date and Time     Expected Discharge Date Expected Discharge Time    Feb 5, 2022             Yvonne Gleason RN

## 2022-02-04 NOTE — PROGRESS NOTES
"Daily progress note    Chief complaint  Doing same  No new complaints   Getting hemodialysis    History of present illness  41-year-old white male with history of chronic kidney disease stage V and getting close to end-stage renal disease hypertension hyperlipidemia coronary artery disease depression and gastroesophageal reflux disease presented to [facility] emergency room with fatigue tired and itching for last 2 weeks which is getting worse.  Patient denies any fever chills chest pain shortness of breath abdominal pain  vomiting diarrhea.  Patient evaluated in ER and found to be fluid overloaded with uremic symptoms need to start hemodialysis admit for management.  Patient agreeable for hemodialysis today but wants to get a trial dialysis catheter to start peritoneal dialysis as an outpatient.     REVIEW OF SYSTEMS  Unremarkable     PHYSICAL EXAM  Blood pressure 148/91, pulse 70, temperature 98 °F (36.7 °C), temperature source Oral, resp. rate 16, height 182.9 cm (72\"), weight 90.4 kg (199 lb 4.7 oz), SpO2 95 %.    General: No acute distress.  HENT: NCAT, PERRL, Nares patent.  Eyes: no scleral icterus.  Neck: trachea midline, no ROM limitations.  CV: regular rhythm, regular rate.  Respiratory: normal effort, CTAB.  Abdomen: soft, nondistended, nontender bowel sounds positive  Musculoskeletal: no deformity.  Neuro: alert, moves all extremities, follows commands.  Skin: warm, dry.  AV fistula left upper extremity, no signs of infection.     LAB RESULTS  Lab Results (last 24 hours)     Procedure Component Value Units Date/Time    Basic Metabolic Panel [[id number]]  (Abnormal) Collected: 02/04/22 0601    Specimen: Blood Updated: 02/04/22 0728     Glucose 86 mg/dL      BUN 45 mg/dL      Creatinine 5.70 mg/dL      Sodium 133 mmol/L      Potassium 4.3 mmol/L      Chloride 100 mmol/L      CO2 22.0 mmol/L      Calcium 8.4 mg/dL      eGFR   Amer --     Comment: <15 Indicative of kidney failure.        eGFR " Non  Amer 11 mL/min/1.73      Comment: <15 Indicative of kidney failure.        BUN/Creatinine Ratio 7.9     Anion Gap 11.0 mmol/L     Narrative:      GFR Normal >60  Chronic Kidney Disease <60  Kidney Failure <15      CBC & Differential [[id number]]  (Abnormal) Collected: 02/04/22 0601    Specimen: Blood Updated: 02/04/22 0651    Narrative:      The following orders were created for panel order CBC & Differential.  Procedure                               Abnormality         Status                     ---------                               -----------         ------                     CBC Auto Differential[[id number]]        Abnormal            Final result                 Please view results for these tests on the individual orders.    CBC Auto Differential [[id number]]  (Abnormal) Collected: 02/04/22 0601    Specimen: Blood Updated: 02/04/22 0651     WBC 6.51 10*3/mm3      RBC 3.11 10*6/mm3      Hemoglobin 9.5 g/dL      Hematocrit 28.6 %      MCV 92.0 fL      MCH 30.5 pg      MCHC 33.2 g/dL      RDW 12.2 %      RDW-SD 40.8 fl      MPV 10.3 fL      Platelets 156 10*3/mm3      Neutrophil % 72.3 %      Lymphocyte % 10.3 %      Monocyte % 12.1 %      Eosinophil % 4.3 %      Basophil % 0.8 %      Immature Grans % 0.2 %      Neutrophils, Absolute 4.71 10*3/mm3      Lymphocytes, Absolute 0.67 10*3/mm3      Monocytes, Absolute 0.79 10*3/mm3      Eosinophils, Absolute 0.28 10*3/mm3      Basophils, Absolute 0.05 10*3/mm3      Immature Grans, Absolute 0.01 10*3/mm3      nRBC 0.0 /100 WBC         Imaging Results (Last 24 Hours)     ** No results found for the last 24 hours. **             ECG 12 Lead  Component   Ref Range & Units 1/31/22 1836 12/13/21 0740   QT Interval   ms 520  460              HEART RATE= 65  bpm  RR Interval= 923  ms  NV Interval= 172  ms  P Horizontal Axis= -11  deg  P Front Axis= 33  deg  QRSD Interval= 111  ms  QT Interval= 520  ms  QRS Axis= 18  deg  T Wave Axis= 78  deg  - ABNORMAL ECG  -  Sinus rhythm  Probable left atrial enlargement  Incomplete right bundle branch block  Left ventricular hypertrophy  Prolonged QT interval  When compared with ECG of 13-Dec-2021 7:40:26,  NO SIGNIFICANT CHANGE             Current Facility-Administered Medications:   •  albumin human 25 % IV SOLN 12.5 g, 12.5 g, Intravenous, PRN, Nick Peña MD  •  albumin human 25 % IV SOLN 12.5 g, 12.5 g, Intravenous, PRN, Nick Peña MD  •  amLODIPine (NORVASC) tablet 10 mg, 10 mg, Oral, Q24H, Nick Peña MD, 10 mg at 02/04/22 1438  •  ARIPiprazole (ABILIFY) tablet 2 mg, 2 mg, Oral, Daily, Mono Roberts MD, 2 mg at 02/04/22 0921  •  aspirin EC tablet 81 mg, 81 mg, Oral, Daily, Mono Roberts MD, 81 mg at 02/04/22 0920  •  atorvastatin (LIPITOR) tablet 10 mg, 10 mg, Oral, Nightly, Mono Roberts MD, 10 mg at 02/03/22 2150  •  bumetanide (BUMEX) tablet 1 mg, 1 mg, Oral, Daily, Nick Peña MD, 1 mg at 02/04/22 0920  •  buPROPion SR (WELLBUTRIN SR) 12 hr tablet 200 mg, 200 mg, Oral, BID, Mono Roberts MD, 200 mg at 02/04/22 0920  •  clopidogrel (PLAVIX) tablet 75 mg, 75 mg, Oral, Daily, Mono Roberts MD, 75 mg at 02/04/22 0920  •  DULoxetine (CYMBALTA) DR capsule 60 mg, 60 mg, Oral, BID, Mono Roberts MD, 60 mg at 02/04/22 0920  •  famotidine (PEPCID) tablet 20 mg, 20 mg, Oral, BID, Mono Roberts MD, 20 mg at 02/04/22 0920  •  FLUoxetine (PROzac) capsule 20 mg, 20 mg, Oral, Daily, Mono Roberts MD, 20 mg at 02/04/22 0920  •  hydrALAZINE (APRESOLINE) tablet 100 mg, 100 mg, Oral, Q8H, Nick Peña MD, 100 mg at 02/04/22 1435  •  hydrOXYzine (ATARAX) tablet 25 mg, 25 mg, Oral, TID PRN, Mono Roberts MD, 25 mg at 02/02/22 1224  •  lamoTRIgine (LaMICtal) tablet 200 mg, 200 mg, Oral, BID, Mono Roberts MD, 200 mg at 02/04/22 0920  •  metoprolol tartrate (LOPRESSOR) tablet 25 mg, 25 mg, Oral, Q12H, Nick Peña MD, 25 mg at 02/04/22 0921  •  sevelamer (RENVELA) tablet 1,600 mg, 1,600 mg, Oral,  TID With Meals, Nick Peña MD, 1,600 mg at 02/04/22 0921  •  sodium bicarbonate tablet 1,300 mg, 1,300 mg, Oral, TID, Nick Peña MD, 1,300 mg at 02/04/22 0920     ASSESSMENT  New end-stage renal disease on hemodialysis  S/p fluid overload  Coronary artery disease  Hypertension  Hyperlipidemia  History of psychosis  Chronic anemia  Gastroesophageal reflux disease    PLAN  CPM  Medically stable  Hemodialysis TIW  General surgery input appreciated  Continue home medications  Stress ulcer DVT prophylaxis  Supportive care  Activity as tolerated  Discussed with nursing staff  Discharge once outpatient hemodialysis spot available    TOO PETERSON MD

## 2022-02-04 NOTE — CASE MANAGEMENT/SOCIAL WORK
Continued Stay Note  [facility]     Patient Name: Jai Gayle Jr.  MRN: [id number]  Today's Date: 2/4/2022    Admit Date: 1/31/2022     Discharge Plan     Row Name 02/04/22 1001       Plan    Plan Follow up with [facility] and the 's office-insurance verification.    Plan Comments Voicemail messages left for both the cashiers office regarding insurance verification and [facility] to confirm if the patient has Medicare A or if it is his father's plan as he reports and to see if the patient will qualify for Medicaid if so will Medicaid cover dialysis for the patient.  JOSELO Perry    Row Name 02/04/22 0857       Plan    Plan Home with father.  CCP working on arranging outpt HD.    Plan Comments S/W pt at bedside who states he does not have insurance.  His father (Jai Gayle Sr.)  has Medicare.  Message left w/ [facility] requesting to eval pt.  CCP did notify Suzi/ Kannanius and she will followup after [facility] eval. ..........sk               Discharge Codes    No documentation.               Expected Discharge Date and Time     Expected Discharge Date Expected Discharge Time    Feb 5, 2022             JOSELO Troy

## 2022-02-06 NOTE — CASE MANAGEMENT/SOCIAL WORK
Case Management Discharge Note      Final Note: DC'd home 2/4    Provided Post Acute Provider List?: N/A             Transportation Services  Private: Car    Final Discharge Disposition Code: 01 - home or self-care

## 2022-02-07 NOTE — CASE MANAGEMENT/SOCIAL WORK
Case Management Discharge Note      Final Note: Pt left AMA on 2/4/22.........sk    Provided Post Acute Provider List?: N/A    Selected Continued Care - Discharged on 2/4/2022 Admission date: 1/31/2022 - Discharge disposition: Left Against Medical Advice    Destination    No services have been selected for the patient.              Durable Medical Equipment    No services have been selected for the patient.              Dialysis/Infusion    No services have been selected for the patient.              Home Medical Care    No services have been selected for the patient.              Therapy    No services have been selected for the patient.              Community Resources    No services have been selected for the patient.              Community & DME    No services have been selected for the patient.                  Transportation Services  Private: Car    Final Discharge Disposition Code: 07 - left AMA

## 2022-02-08 RX ORDER — CLOPIDOGREL BISULFATE 75 MG/1
TABLET ORAL
Qty: 90 TABLET | Refills: 1 | Status: SHIPPED | OUTPATIENT
Start: 2022-02-08 | End: 2022-03-08 | Stop reason: SDUPTHER

## 2022-02-08 RX ORDER — ATORVASTATIN CALCIUM 80 MG/1
TABLET, FILM COATED ORAL
Qty: 90 TABLET | Refills: 1 | Status: SHIPPED | OUTPATIENT
Start: 2022-02-08 | End: 2022-03-22 | Stop reason: SDUPTHER

## 2022-03-08 RX ORDER — CLOPIDOGREL BISULFATE 75 MG/1
75 TABLET ORAL DAILY
Qty: 90 TABLET | Refills: 1 | Status: SHIPPED | OUTPATIENT
Start: 2022-03-08 | End: 2022-09-09

## 2022-03-22 ENCOUNTER — OFFICE VISIT (OUTPATIENT)
Dept: CARDIOLOGY | Facility: CLINIC | Age: 42
End: 2022-03-22

## 2022-03-22 VITALS
BODY MASS INDEX: 28.71 KG/M2 | WEIGHT: 212 LBS | HEIGHT: 72 IN | SYSTOLIC BLOOD PRESSURE: 129 MMHG | HEART RATE: 68 BPM | DIASTOLIC BLOOD PRESSURE: 74 MMHG

## 2022-03-22 DIAGNOSIS — Z72.0 TOBACCO USE: ICD-10-CM

## 2022-03-22 DIAGNOSIS — I10 ESSENTIAL HYPERTENSION: ICD-10-CM

## 2022-03-22 DIAGNOSIS — I34.0 NONRHEUMATIC MITRAL VALVE REGURGITATION: Primary | ICD-10-CM

## 2022-03-22 DIAGNOSIS — Z09 HOSPITAL DISCHARGE FOLLOW-UP: ICD-10-CM

## 2022-03-22 DIAGNOSIS — E78.5 HYPERLIPIDEMIA, UNSPECIFIED HYPERLIPIDEMIA TYPE: ICD-10-CM

## 2022-03-22 DIAGNOSIS — I25.10 CORONARY ARTERY DISEASE INVOLVING NATIVE CORONARY ARTERY OF NATIVE HEART WITHOUT ANGINA PECTORIS: ICD-10-CM

## 2022-03-22 PROCEDURE — 99214 OFFICE O/P EST MOD 30 MIN: CPT | Performed by: NURSE PRACTITIONER

## 2022-03-22 RX ORDER — ATORVASTATIN CALCIUM 80 MG/1
80 TABLET, FILM COATED ORAL
Qty: 90 TABLET | Refills: 1 | Status: SHIPPED | OUTPATIENT
Start: 2022-03-22 | End: 2022-09-13

## 2022-03-22 NOTE — PROGRESS NOTES
Subjective:        Jai Gayle Jr. is a 41 y.o. male who here for follow up    No chief complaint on file.    1 year follow-up for CAD    HPI     Jai Gayle is a 41-year-old male, who is current with me.  He has a history to include CAD and obesity.   He states he had a hospitalization in Feb and March and he was diagnosed with a PE.    February 2022 lipid panel showed , HDL 40, LDL 77 and triglycerides 165.  Echo on August 2021 revealed EF 59.2%, RV systolic pressure 50 regurgitation is normal, LV diastolic function is normal, and no evidence of pericardial effusion.  Stress test 2021 revealed equivocal ECG evidence of myocardial ischemia.  Negative clinical evidence of myocardial ischemia.  Findings consistent with an equivocal ECG stress test asymptomatic for chest pain with heart rate achieved.  Cardiac cath with successful angioplasty and stent 100% occluded proximal RCA to 0%.         The following portions of the patient's history were reviewed and updated as appropriate: allergies, current medications, past family history, past medical history, past social history, past surgical history and problem list.    Past Medical History:   Diagnosis Date   • Anxiety    • BPH (benign prostatic hyperplasia)    • CKD (chronic kidney disease)    • COPD (chronic obstructive pulmonary disease) ([facility])    • Diabetes ([facility])    • Heart disease    • Hyperlipidemia    • Hypertension    • Keratosis pilaris    • MI (myocardial infarction) ([facility]) 08/2020   • PONV (postoperative nausea and vomiting)     HAS HAD SURGERY MORE RECENT WITHOUT N/V.         reports that he has been smoking cigarettes. He has a 51.00 pack-year smoking history. He has never used smokeless tobacco. He reports that he does not drink alcohol and does not use drugs.     Family History   Problem Relation Age of Onset   • Hypertension Mother    • Hyperlipidemia Mother    • Heart disease Mother    • Heart attack Mother    • Diabetes Mother    • Colon  cancer Mother    • Heart failure Neg Hx    • Malig Hyperthermia Neg Hx        ROS     Review of Systems  Constitutional: No wt loss, fever, fatigue  Gastrointestinal: No nausea, abdominal pain  Behavioral/Psych: No insomnia or anxiety  Cardiovascular: Denies chest pain, shortness of breath.       Objective:           Physical Exam  Vitals and nursing note reviewed.   Constitutional:       Appearance: He is well-developed.   HENT:      Head: Normocephalic.      Right Ear: External ear normal.      Left Ear: External ear normal.   Neck:      Vascular: No JVD.   Cardiovascular:      Rate and Rhythm: Normal rate and regular rhythm.      Pulses: Intact distal pulses.      Heart sounds: Normal heart sounds. No murmur heard.    No friction rub. No gallop.   Pulmonary:      Effort: Pulmonary effort is normal. No respiratory distress.      Breath sounds: Normal breath sounds. No stridor. No rales.   Abdominal:      General: Bowel sounds are normal. There is no distension.      Palpations: Abdomen is soft.      Tenderness: There is no abdominal tenderness. There is no guarding.   Musculoskeletal:         General: No tenderness. Normal range of motion.      Cervical back: Normal range of motion.   Skin:     General: Skin is warm.   Neurological:      Mental Status: He is alert and oriented to person, place, and time.      Deep Tendon Reflexes: Reflexes are normal and symmetric.   Psychiatric:         Judgment: Judgment normal.         Procedures        Physician Conclusions   Any valve disease noted in the report is non-rheumatic unless otherwise specifically noted.   Summary:                 Overall left ventricular function is normal.                            The estimated ejection fraction is 55-60%.                            No regional wall motion abnormalities noted.                            Mild to moderate concentric left ventricular hypertrophy.                            Mildly dilated left ventricle.                             Mitral valve tissue Doppler E/E'' ratio consistent with elevated left atrial pressures.                            There is mild left atrial enlargement.                            Trace-to-mild tricuspid regurgitation.                            The right ventricular systolic pressure is estimated to be 50-55mmHg.   Findings   Left Ventricle:           The ejection fraction biplane was calculated at 56%.                             Mildly dilated left ventricle.                             Mild to moderate concentric left ventricular hypertrophy.                             Overall left ventricular function is normal.                             The estimated ejection fraction is 55-60%.                             No regional wall motion abnormalities noted.                             Restrictive filling pattern.                             Mitral valve tissue Doppler E/E'' ratio consistent with elevated left atrial pressures.     Left Atrium:              There is mild left atrial enlargement.   Right Ventricle:          The right ventricular chamber size and systolic function are within normal limits.   Right Atrium:             The right atrial chamber size appears normal.   Aortic Valve:             Structurally normal aortic valve.                             Trace aortic regurgitation is noted.   Mitral Valve:             Structurally normal mitral valve.                             Mild mitral regurgitation is present.   Tricuspid Valve:          Tricuspid valve is structurally normal.                             Trace-to-mild tricuspid regurgitation.                             The right ventricular systolic pressure is estimated to be 50-55mmHg.   Pulmonic Valve:           Pulmonic valve is structurally normal.                             Trace pulmonic insufficiency present.   Pericardium:              There is no evidence of pericardial effusion.   Pleura:                   No evidence  of pleural effusion.   Aorta/Great Vessels: The aortic root appears normal.                        The Pulmonary artery is within normal limits.                        IVC not well visualized.                        The aortic arch appears normal.     Interpretation Summary     · Aortic valve dimensionless index is 0.8.  · Peak velocity of the flow distal to the aortic valve is 131.0 cm/s.  · Aortic valve maximum pressure gradient is 6.9 mmHg.  · Aortic valve mean pressure gradient is 3.0 mmHg.  · Calculated right ventricular systolic pressure from tricuspid regurgitation is 32 mmHg.  · Left atrial cavity size is borderline dilated.  · Calculated EF = 46.7%  · There is no evidence of pericardial effusion     HEMODYNAMIC / ANGIOGRAPHIC DATA:    1. Left ventricular end diastolic pressure was 10 mmHg.  2. Left ventriculography revealed an EF around 55 %.    3. The left main is normal left main.  4. The left anterior descending artery is *.Left anterior descending shows early atherosclerotic plaque with the first large diagonal branch at the origin was 80 to 90% stenosis  5. The left circumflex is .Circumflex artery was nondominant with early atherosclerotic plaque  6. The right coronary artery is .Right coronary artery was dominant with a proximal 100% reduced to 0% with 3.5/23 Xience stent dilated to 3.6  7. Successful angioplasty and stent to 100% occluded proximal RCA to 0% with 3.5/23 Xience stent dilated to 3.6     SKYLER FLOW    PRE...0....       POST....3..     TYPE OF LESION......C.......     RECOMMENDATIONS:  Post-procedure care will focus on prevention of any ischemic events and congestive complications.  Aggressive risk factor modification will be carried out.  Importance of taking dual antiplatelets for one year  has been explained, risk of stent thrombosis leading to the acute MI, which carries high morbidity and mortality has been explained     Discontinuation or interruptions of these medications should be  under the strict guidance of appropriate health professional           Current Outpatient Medications:   •  amLODIPine (NORVASC) 10 MG tablet, Take 1 tablet by mouth Daily., Disp: 90 tablet, Rfl: 1  •  asenapine maleate (SAPHRIS) 10 MG sublingual tablet sublingual tablet, Place 20 mg under the tongue Every Night., Disp: , Rfl:   •  aspirin 81 MG EC tablet, Take 1 tablet by mouth Daily., Disp: 30 tablet, Rfl: 5  •  atorvastatin (LIPITOR) 80 MG tablet, TAKE 1 TABLET BY MOUTH EVERY DAY AT NIGHT, Disp: 90 tablet, Rfl: 1  •  Brexpiprazole (Rexulti) 2 MG tablet, Take 3 mg by mouth Every Morning. Pt reports he takes 3 mg now, Disp: , Rfl:   •  buPROPion SR (WELLBUTRIN SR) 200 MG 12 hr tablet, Take 200 mg by mouth 2 (Two) Times a Day., Disp: , Rfl:   •  clopidogrel (PLAVIX) 75 MG tablet, Take 1 tablet by mouth Daily., Disp: 90 tablet, Rfl: 1  •  DULoxetine (CYMBALTA) 60 MG capsule, Take 60 mg by mouth 2 (Two) Times a Day., Disp: , Rfl:   •  famotidine (PEPCID) 20 MG tablet, Take 20 mg by mouth 2 (Two) Times a Day., Disp: , Rfl:   •  FLUoxetine (PROzac) 20 MG tablet, Take 5 tablets by mouth Daily., Disp: , Rfl:   •  hydrALAZINE (APRESOLINE) 100 MG tablet, Take 100 mg by mouth 3 (Three) Times a Day., Disp: , Rfl:   •  hydrALAZINE (APRESOLINE) 25 MG tablet, Take 25 mg by mouth 3 (Three) Times a Day., Disp: , Rfl:   •  lamoTRIgine (LaMICtal) 200 MG tablet, Take 200 mg by mouth Daily., Disp: , Rfl:   •  metoprolol tartrate (LOPRESSOR) 25 MG tablet, TAKE 1 TABLET BY MOUTH EVERY 12 HOURS, Disp: 180 tablet, Rfl: 1  •  multivitamin (multivitamin) tablet tablet, Take 1 tablet by mouth Daily. HOLD PER MD INSTR, Disp: , Rfl:   •  SEVELAMER HCL PO, Take 1,600 mg by mouth 3 (Three) Times a Day With Meals., Disp: , Rfl:   •  SODIUM BICARBONATE PO, Take 650 mg by mouth 2 (Two) Times a Day., Disp: , Rfl:      Assessment:        Patient Active Problem List   Diagnosis   • Acute MI, inferior wall (HCC)   • CAD (coronary artery disease)   •  Essential hypertension   • Hyperlipidemia   • Long term (current) use of antithrombotics/antiplatelets   • CKD (chronic kidney disease) stage 4, GFR 15-29 ml/min ([facility])   • CKD (chronic kidney disease) stage 5, GFR less than 15 ml/min ([facility])   • ESRD needing dialysis ([facility])               Plan:   1.  Hospitalized presently 3 times for  pulmonary congestion, mitral valve regurgitation. He was started on diuretic.  He states he is doing well now.  Full report of echo as above.  We will have him follow-up in 3 months with an echo. He recently started dialysis. He will monitor his diet and fluid.  He verbalizes understanding.    2.  Hyperlipidemia: Previous lipid panel as above.  He states his primary care physician manages his cholesterol and lab work.Continue statin.    Risk of the hyperlipidemia, importance of the treatment has been explained. Pros and cons of the statins has been explained. Regular blood workup as well as side effects including the liver failure, myelopathy death has been explained.    3.  Hypertension: Today in the office blood pressure is 129/74 and heart rate is 68.  Monitor. Continue beta-blockade, amlodipine, and hydralazine.      Educated patient on exercising for at least 30 minutes a day for 2 to 3 days a week. Importance of controlling hypertension and blood pressure checkup on the regular basis has been explained. Hypertension as a silent killer has been discussed. Risk reduction of the weight and regular exercises to control the hypertension has been explained.      4.  Tobacco abuse: He states he smokes a pack and a half a day.       It has been explained that tobacco abuse is extremely harmful to the body including to the cardiovascular, it significantly increases the risk of atherosclerosis, myocardial infarction, strokes and peripheral vascular disease. Strongly advised to stop tobacco abuse. Secondhand smoking also has been explained.       5.  Obesity:  BMI 28.75   Counseled on need to  work toward healthy BMI, diet and exercise modifications and strategies that may work for him.  Health consequences of high BMI discussed. Low fat, lower carb diet reviewed.  I recommended 30 minutes of aerobic exercise 5 times a week.     I recommended healthy diet with lower unhealthy fats, higher healthy fats and lower in carbs and higher in protein with adequate hydration.     6.  CAD with history of stent: Denies any chest pain.  Previous ischemic work-up as above. Continue Plavix, statin, beta-blockade and aspirin.    Risk reduction for the coronary artery disease, controlling the blood pressure, blood sugar management, cholesterol management, exercise, stress management, and proper compliance with medications and follow-up has been discussed            No diagnosis found.    There are no diagnoses linked to this encounter.    COUNSELING: done  Jai Gayle Jr.Counseling was given to patient for the following topics: diagnostic results, risk factor reductions, impressions, risks and benefits of treatment options and importance of treatment compliance .       SMOKING COUNSELING:as above  He will follow up in 3 months with an echo, unless he needs to be seen sooner.    Sincerely,   PAUL Hassan  [facility]  03/22/22      11:49 EDT    EMR Dragon/Transcription disclaimer:   Much of this encounter note is an electronic transcription/translation of spoken language to printed text. The electronic translation of spoken language may permit erroneous, or at times, nonsensical words or phrases to be inadvertently transcribed; Although I have reviewed the note for such errors, some may still exist.

## 2022-05-02 RX ORDER — AMLODIPINE BESYLATE 10 MG/1
10 TABLET ORAL DAILY
Qty: 30 TABLET | Refills: 3 | Status: SHIPPED | OUTPATIENT
Start: 2022-05-02

## 2022-05-05 NOTE — TELEPHONE ENCOUNTER
Rx Refill Note  Requested Prescriptions     Pending Prescriptions Disp Refills   • hydrALAZINE (APRESOLINE) 25 MG tablet [Pharmacy Med Name: HYDRALAZINE 25 MG TABLET] 270 tablet 5     Sig: TAKE 1 TABLET BY MOUTH EVERY 8 (EIGHT) HOURS.      Last office visit with prescribing clinician: 3/22/2022      Next office visit with prescribing clinician: 6/30/2022       {TIP  Please add Last Relevant Lab Date if appropriate:23}     Blank Alcocer, SONIA  05/05/22, 11:01 EDT

## 2022-05-09 RX ORDER — HYDRALAZINE HYDROCHLORIDE 25 MG/1
TABLET, FILM COATED ORAL
Qty: 270 TABLET | Refills: 5 | Status: SHIPPED | OUTPATIENT
Start: 2022-05-09

## 2022-06-30 ENCOUNTER — HOSPITAL ENCOUNTER (OUTPATIENT)
Dept: CARDIOLOGY | Facility: HOSPITAL | Age: 42
Discharge: HOME | End: 2022-06-30
Admitting: NURSE PRACTITIONER

## 2022-06-30 ENCOUNTER — OFFICE VISIT (OUTPATIENT)
Dept: CARDIOLOGY | Facility: CLINIC | Age: 42
End: 2022-06-30

## 2022-06-30 VITALS
WEIGHT: 227 LBS | DIASTOLIC BLOOD PRESSURE: 73 MMHG | SYSTOLIC BLOOD PRESSURE: 127 MMHG | HEIGHT: 73 IN | BODY MASS INDEX: 30.09 KG/M2 | HEART RATE: 75 BPM

## 2022-06-30 VITALS
HEIGHT: 72 IN | DIASTOLIC BLOOD PRESSURE: 54 MMHG | WEIGHT: 212 LBS | BODY MASS INDEX: 28.71 KG/M2 | SYSTOLIC BLOOD PRESSURE: 109 MMHG | HEART RATE: 67 BPM

## 2022-06-30 DIAGNOSIS — I10 ESSENTIAL HYPERTENSION: ICD-10-CM

## 2022-06-30 DIAGNOSIS — E78.00 PURE HYPERCHOLESTEROLEMIA: ICD-10-CM

## 2022-06-30 DIAGNOSIS — I25.10 CORONARY ARTERY DISEASE INVOLVING NATIVE CORONARY ARTERY OF NATIVE HEART WITHOUT ANGINA PECTORIS: Primary | ICD-10-CM

## 2022-06-30 LAB
BH CV ECHO MEAS - ACS: 2.35 CM
BH CV ECHO MEAS - AO MAX PG: 12.1 MMHG
BH CV ECHO MEAS - AO MEAN PG: 5.9 MMHG
BH CV ECHO MEAS - AO ROOT DIAM: 3.1 CM
BH CV ECHO MEAS - AO V2 MAX: 173.7 CM/SEC
BH CV ECHO MEAS - AO V2 VTI: 33.5 CM
BH CV ECHO MEAS - AVA(I,D): 4.5 CM2
BH CV ECHO MEAS - EDV(CUBED): 147.3 ML
BH CV ECHO MEAS - EDV(MOD-SP2): 65 ML
BH CV ECHO MEAS - EDV(MOD-SP4): 86 ML
BH CV ECHO MEAS - EF(MOD-BP): 55.6 %
BH CV ECHO MEAS - EF(MOD-SP2): 50.8 %
BH CV ECHO MEAS - EF(MOD-SP4): 55.8 %
BH CV ECHO MEAS - ESV(CUBED): 47.3 ML
BH CV ECHO MEAS - ESV(MOD-SP2): 32 ML
BH CV ECHO MEAS - ESV(MOD-SP4): 38 ML
BH CV ECHO MEAS - FS: 31.5 %
BH CV ECHO MEAS - IVS/LVPW: 0.86 CM
BH CV ECHO MEAS - IVSD: 1.3 CM
BH CV ECHO MEAS - LA DIMENSION: 4.4 CM
BH CV ECHO MEAS - LAT PEAK E' VEL: 16.1 CM/SEC
BH CV ECHO MEAS - LV DIASTOLIC VOL/BSA (35-75): 39.4 CM2
BH CV ECHO MEAS - LV MASS(C)D: 320.4 GRAMS
BH CV ECHO MEAS - LV MAX PG: 7.7 MMHG
BH CV ECHO MEAS - LV MEAN PG: 3.8 MMHG
BH CV ECHO MEAS - LV SYSTOLIC VOL/BSA (12-30): 17.4 CM2
BH CV ECHO MEAS - LV V1 MAX: 138.4 CM/SEC
BH CV ECHO MEAS - LV V1 VTI: 26.7 CM
BH CV ECHO MEAS - LVIDD: 5.3 CM
BH CV ECHO MEAS - LVIDS: 3.6 CM
BH CV ECHO MEAS - LVOT AREA: 5.7 CM2
BH CV ECHO MEAS - LVOT DIAM: 2.7 CM
BH CV ECHO MEAS - LVPWD: 1.52 CM
BH CV ECHO MEAS - MED PEAK E' VEL: 8.3 CM/SEC
BH CV ECHO MEAS - MR MAX PG: 19.5 MMHG
BH CV ECHO MEAS - MR MAX VEL: 221 CM/SEC
BH CV ECHO MEAS - MV A MAX VEL: 74.4 CM/SEC
BH CV ECHO MEAS - MV DEC SLOPE: 400.8 CM/SEC2
BH CV ECHO MEAS - MV DEC TIME: 217 MSEC
BH CV ECHO MEAS - MV E MAX VEL: 89.7 CM/SEC
BH CV ECHO MEAS - MV E/A: 1.21
BH CV ECHO MEAS - MV MAX PG: 5 MMHG
BH CV ECHO MEAS - MV MEAN PG: 2.28 MMHG
BH CV ECHO MEAS - MV P1/2T: 84.3 MSEC
BH CV ECHO MEAS - MV V2 VTI: 35.1 CM
BH CV ECHO MEAS - MVA(P1/2T): 2.6 CM2
BH CV ECHO MEAS - MVA(VTI): 4.3 CM2
BH CV ECHO MEAS - PA ACC TIME: 0.16 SEC
BH CV ECHO MEAS - PA PR(ACCEL): 7.7 MMHG
BH CV ECHO MEAS - PA V2 MAX: 138.5 CM/SEC
BH CV ECHO MEAS - QP/QS: 1.08
BH CV ECHO MEAS - RAP SYSTOLE: 3 MMHG
BH CV ECHO MEAS - RV MAX PG: 3.5 MMHG
BH CV ECHO MEAS - RV V1 MAX: 93 CM/SEC
BH CV ECHO MEAS - RV V1 VTI: 20.2 CM
BH CV ECHO MEAS - RVDD: 3.3 CM
BH CV ECHO MEAS - RVOT DIAM: 3.2 CM
BH CV ECHO MEAS - RVSP: 38 MMHG
BH CV ECHO MEAS - SI(MOD-SP2): 15.1 ML/M2
BH CV ECHO MEAS - SI(MOD-SP4): 22 ML/M2
BH CV ECHO MEAS - SV(LVOT): 151.4 ML
BH CV ECHO MEAS - SV(MOD-SP2): 33 ML
BH CV ECHO MEAS - SV(MOD-SP4): 48 ML
BH CV ECHO MEAS - SV(RVOT): 163 ML
BH CV ECHO MEAS - TAPSE (>1.6): 2.2 CM
BH CV ECHO MEAS - TR MAX PG: 35 MMHG
BH CV ECHO MEAS - TR MAX VEL: 295.9 CM/SEC
BH CV ECHO MEASUREMENTS AVERAGE E/E' RATIO: 7.35
BH CV XLRA - RV BASE: 4.3 CM
BH CV XLRA - RV LENGTH: 8.1 CM
BH CV XLRA - RV MID: 4.1 CM
BH CV XLRA - TDI S': 15.7 CM/SEC
LEFT ATRIUM VOLUME INDEX: 45.9 ML/M2
MAXIMAL PREDICTED HEART RATE: 177 BPM
SINUS: 3 CM
STJ: 3.2 CM
STRESS TARGET HR: 150 BPM

## 2022-06-30 PROCEDURE — 99213 OFFICE O/P EST LOW 20 MIN: CPT | Performed by: INTERNAL MEDICINE

## 2022-06-30 PROCEDURE — 93306 TTE W/DOPPLER COMPLETE: CPT | Performed by: INTERNAL MEDICINE

## 2022-06-30 PROCEDURE — 93306 TTE W/DOPPLER COMPLETE: CPT

## 2022-06-30 RX ORDER — TORSEMIDE 100 MG/1
50 TABLET ORAL 2 TIMES DAILY
COMMUNITY

## 2022-09-09 RX ORDER — CLOPIDOGREL BISULFATE 75 MG/1
75 TABLET ORAL DAILY
Qty: 90 TABLET | Refills: 1 | Status: SHIPPED | OUTPATIENT
Start: 2022-09-09 | End: 2023-03-02

## 2022-09-13 RX ORDER — ATORVASTATIN CALCIUM 80 MG/1
80 TABLET, FILM COATED ORAL
Qty: 90 TABLET | Refills: 1 | Status: SHIPPED | OUTPATIENT
Start: 2022-09-13 | End: 2023-03-27

## 2023-03-02 RX ORDER — CLOPIDOGREL BISULFATE 75 MG/1
75 TABLET ORAL DAILY
Qty: 90 TABLET | Refills: 1 | Status: SHIPPED | OUTPATIENT
Start: 2023-03-02

## 2023-03-27 RX ORDER — ATORVASTATIN CALCIUM 80 MG/1
80 TABLET, FILM COATED ORAL
Qty: 90 TABLET | Refills: 0 | Status: SHIPPED | OUTPATIENT
Start: 2023-03-27

## 2023-03-27 NOTE — TELEPHONE ENCOUNTER
Rx Refill Note  Requested Prescriptions     Pending Prescriptions Disp Refills   • atorvastatin (LIPITOR) 80 MG tablet [Pharmacy Med Name: ATORVASTATIN 80MG TABLETS] 90 tablet 0     Sig: TAKE 1 TABLET BY MOUTH EVERY NIGHT AT BEDTIME      Last office visit with prescribing clinician: 3/22/2022   Last telemedicine visit with prescribing clinician: 6/20/2023   Next office visit with prescribing clinician: Visit date not found                         Would you like a call back once the refill request has been completed: [] Yes [] No    If the office needs to give you a call back, can they leave a voicemail: [] Yes [] No    Blank Alcocer, [facility]  03/27/23, 08:51 EDT

## 2023-06-01 RX ORDER — CLOPIDOGREL BISULFATE 75 MG/1
75 TABLET ORAL DAILY
Qty: 90 TABLET | Refills: 0 | Status: SHIPPED | OUTPATIENT
Start: 2023-06-01

## 2023-09-08 ENCOUNTER — OFFICE VISIT (OUTPATIENT)
Dept: CARDIOLOGY | Facility: CLINIC | Age: 43
End: 2023-09-08
Payer: MEDICARE

## 2023-09-08 VITALS
SYSTOLIC BLOOD PRESSURE: 160 MMHG | WEIGHT: 231.48 LBS | HEIGHT: 73 IN | BODY MASS INDEX: 30.68 KG/M2 | HEART RATE: 79 BPM | DIASTOLIC BLOOD PRESSURE: 93 MMHG

## 2023-09-08 DIAGNOSIS — E78.00 PURE HYPERCHOLESTEROLEMIA: ICD-10-CM

## 2023-09-08 DIAGNOSIS — R94.31 ABNORMAL ELECTROCARDIOGRAM: ICD-10-CM

## 2023-09-08 DIAGNOSIS — I10 ESSENTIAL HYPERTENSION: ICD-10-CM

## 2023-09-08 DIAGNOSIS — I25.10 CORONARY ARTERY DISEASE INVOLVING NATIVE CORONARY ARTERY OF NATIVE HEART WITHOUT ANGINA PECTORIS: Primary | ICD-10-CM

## 2023-09-08 PROCEDURE — 93000 ELECTROCARDIOGRAM COMPLETE: CPT

## 2023-09-08 PROCEDURE — 3080F DIAST BP >= 90 MM HG: CPT

## 2023-09-08 PROCEDURE — 99214 OFFICE O/P EST MOD 30 MIN: CPT

## 2023-09-08 PROCEDURE — 3077F SYST BP >= 140 MM HG: CPT

## 2023-09-08 NOTE — PROGRESS NOTES
Subjective:        Jai Gayle Jr. is a 43 y.o. male who here for follow up    Chief Complaint   Patient presents with    Coronary Artery Disease     1 YR FOLLOW UP       HPI    This is a 43-year-old male with coronary artery disease, hypertension, hyperlipidemia, ESRD on HD, DM.  He follows up in office today for management of coronary artery disease. Echo 6/30/2022 EF 55%, normal LV function.  Stress test 2021 was an equivocal ECG stress test.  Cardiac catheterization 8/8/2020 normal left main, LAD early atherosclerotic plaque with first large diagonal branch at the origin was 8090% stenosis.  Circumflex nondominant with early atherosclerotic plaque.  RCA dominant with proximal 100% reduced to 0% with drug-eluting stent.  Mild anterior wall hypokinetic estimated EF 55%.    Hd tues/thur/sat at HD yesterday bp 132/78.       The following portions of the patient's history were reviewed and updated as appropriate: allergies, current medications, past family history, past medical history, past social history, past surgical history and problem list.    Past Medical History:   Diagnosis Date    Anxiety     BPH (benign prostatic hyperplasia)     CKD (chronic kidney disease)     COPD (chronic obstructive pulmonary disease)     Diabetes     Heart disease     Hyperlipidemia     Hypertension     Keratosis pilaris     MI (myocardial infarction) 08/2020    PONV (postoperative nausea and vomiting)     HAS HAD SURGERY MORE RECENT WITHOUT N/V.         reports that he has been smoking cigarettes. He has a 51.00 pack-year smoking history. He has never used smokeless tobacco. He reports that he does not drink alcohol and does not use drugs.     Family History   Problem Relation Age of Onset    Hypertension Mother     Hyperlipidemia Mother     Heart disease Mother     Heart attack Mother     Diabetes Mother     Colon cancer Mother     Heart failure Neg Hx     Malig Hyperthermia Neg Hx        ROS     Review of  Systems  Constitutional: No wt loss, fever, fatigue  Gastrointestinal: No nausea, abdominal pain  Behavioral/Psych: No insomnia or anxiety  Cardiovascular no chest pain or shortness of breath      Objective:           Vitals and nursing note reviewed.   Constitutional:       Appearance: Well-developed.   HENT:      Head: Normocephalic.      Right Ear: External ear normal.      Left Ear: External ear normal.   Neck:      Vascular: No JVD.   Pulmonary:      Effort: Pulmonary effort is normal. No respiratory distress.      Breath sounds: Normal breath sounds. No stridor. No rales.   Cardiovascular:      Normal rate. Regular rhythm.      No gallop.    Pulses:     Intact distal pulses.   Edema:     Peripheral edema absent.   Abdominal:      General: Bowel sounds are normal. There is no distension.      Palpations: Abdomen is soft.      Tenderness: There is no abdominal tenderness. There is no guarding.   Musculoskeletal: Normal range of motion.         General: No tenderness.      Cervical back: Normal range of motion. Skin:     General: Skin is warm.   Neurological:      Mental Status: Alert and oriented to person, place, and time.      Deep Tendon Reflexes: Reflexes are normal and symmetric.   Psychiatric:         Judgment: Judgment normal.         ECG 12 Lead    Date/Time: 9/8/2023 3:31 PM  Performed by: Blank Fallon APRN  Authorized by: Blank Fallon APRN   Comparison: compared with previous ECG from 1/31/2022  Similar to previous ECG  Rhythm: sinus rhythm  Rate: normal  BPM: 79  Other findings: left ventricular hypertrophy  Other findings comments: QTc 495    Clinical impression: abnormal EKG        Interpretation Summary       Equivocal ECG evidence of myocardial ischemia  Negative clinical evidence of myocardial ischemia. Findings consistent with an equivocal ECG stress test. Asymptomatic for chest pain for heart rate achieved    Interpretation Summary    Calculated left ventricular EF = 55.6% Estimated  left ventricular EF was in agreement with the calculated left ventricular EF.  Left ventricular diastolic function was normal.  There is no evidence of pericardial effusion     HEMODYNAMIC / ANGIOGRAPHIC DATA:    Left ventricular end diastolic pressure was 10 mmHg.  Left ventriculography revealed an EF around 55 %.    The left main is normal left main.  The left anterior descending artery is *.Left anterior descending shows early atherosclerotic plaque with the first large diagonal branch at the origin was 80 to 90% stenosis  The left circumflex is .Circumflex artery was nondominant with early atherosclerotic plaque  The right coronary artery is .Right coronary artery was dominant with a proximal 100% reduced to 0% with 3.5/23 Xience stent dilated to 3.6  Successful angioplasty and stent to 100% occluded proximal RCA to 0% with 3.5/23 Xience stent dilated to 3.6     SKYLER FLOW    PRE...0....       POST....3..     TYPE OF LESION......C.......     RECOMMENDATIONS:  Post-procedure care will focus on prevention of any ischemic events and congestive complications.  Aggressive risk factor modification will be carried out.  Importance of taking dual antiplatelets for one year  has been explained, risk of stent thrombosis leading to the acute MI, which carries high morbidity and mortality has been explained     Discontinuation or interruptions of these medications should be under the strict guidance of appropriate health professional          Current Outpatient Medications:     amLODIPine (NORVASC) 10 MG tablet, TAKE 1 TABLET BY MOUTH DAILY (Patient taking differently: Take 1 tablet by mouth Daily. TAKES MONDAY WEDNESDAY FRIDAY BID TUES THURS SAT HOLD SUNDAY - AM), Disp: 30 tablet, Rfl: 3    asenapine maleate (SAPHRIS) 10 MG sublingual tablet sublingual tablet, Place 2 tablets under the tongue Every Night., Disp: , Rfl:     aspirin 81 MG EC tablet, Take 1 tablet by mouth Daily., Disp: 30 tablet, Rfl: 5    atorvastatin  (LIPITOR) 80 MG tablet, TAKE 1 TABLET BY MOUTH EVERY NIGHT AT BEDTIME, Disp: 90 tablet, Rfl: 0    buPROPion SR (WELLBUTRIN SR) 200 MG 12 hr tablet, Take 1 tablet by mouth 2 (Two) Times a Day., Disp: , Rfl:     DULoxetine (CYMBALTA) 60 MG capsule, Take 1 capsule by mouth 2 (Two) Times a Day., Disp: , Rfl:     FLUoxetine (PROzac) 20 MG tablet, Take 5 tablets by mouth Daily., Disp: , Rfl:     lamoTRIgine (LaMICtal) 200 MG tablet, Take 1 tablet by mouth Daily., Disp: , Rfl:     metoprolol tartrate (LOPRESSOR) 25 MG tablet, TAKE 1 TABLET BY MOUTH EVERY 12 HOURS (Patient taking differently: 2 tablets.), Disp: 180 tablet, Rfl: 1    SEVELAMER HCL PO, Take 1,600 mg by mouth 3 (Three) Times a Day With Meals., Disp: , Rfl:     torsemide (DEMADEX) 100 MG tablet, Take 0.5 tablets by mouth 2 (Two) Times a Day., Disp: , Rfl:     hydrALAZINE (APRESOLINE) 100 MG tablet, Take 1 tablet by mouth 3 (Three) Times a Day. TAKES BID ON TUES. THURS AND SAT., Disp: , Rfl:      Assessment:        Patient Active Problem List   Diagnosis    Acute MI, inferior wall    CAD (coronary artery disease)    Essential hypertension    Hyperlipidemia    Long term (current) use of antithrombotics/antiplatelets    CKD (chronic kidney disease) stage 4, GFR 15-29 ml/min    CKD (chronic kidney disease) stage 5, GFR less than 15 ml/min    ESRD needing dialysis               Plan:   Coronary artery disease: in er for pulmonary edema in August. I will check stress test and echo.  Continue aspirin, statin, beta-blockade.    It was explained to the patient that stress testing carries 85% specificity/sensitivity, and does not rule out future cardiac event.  Risks of the procedure were explained to the patient including shortness of breath, induction of myocardial infarction, and dizziness.  Patient is agreeable to proceeding with stress testing.     Hypertension: 132/78 at dialysis.  Continue amlodipine, Lopressor, hydralazine.    Importance of controlling  hypertension and blood pressure  checkup on the regular basis has been explained. Hypertension as a silent killer has been discussed. Risk reduction of the weight and regular exercises to control the hypertension has been explained.    3.  Hyperlipidemia: He reports his PCP manages his cholesterol labs.  Continue statin therapy    Risk of the hyperlipidemia, importance of the treatment has been explained. Pros and cons of the statins has been explained. Regular blood workup as well as side effects including the liver failure, myelopathy death has been explained.                 No diagnosis found.    There are no diagnoses linked to this encounter.    COUNSELING: done    Jai Gayle Jr.Counseling was given to patient for the following topics: diagnostic results, risk factor reductions, impressions, risks and benefits of treatment options and importance of treatment compliance .       SMOKING COUNSELING: denies    Lexiscan and echo,  Follow up for results    Sincerely,   PAUL Gordon  [facility]  09/08/23  15:31 EDT    EMR Dragon/Transcription disclaimer:   Much of this encounter note is an electronic transcription/translation of spoken language to printed text. The electronic translation of spoken language may permit erroneous, or at times, nonsensical words or phrases to be inadvertently transcribed; Although I have reviewed the note for such errors, some may still exist.

## 2023-09-15 ENCOUNTER — OFFICE VISIT (OUTPATIENT)
Dept: SURGERY | Facility: CLINIC | Age: 43
End: 2023-09-15
Payer: MEDICARE

## 2023-09-15 VITALS
HEIGHT: 73 IN | WEIGHT: 221 LBS | DIASTOLIC BLOOD PRESSURE: 82 MMHG | SYSTOLIC BLOOD PRESSURE: 145 MMHG | BODY MASS INDEX: 29.29 KG/M2

## 2023-09-15 DIAGNOSIS — Z99.2 ESRD ON HEMODIALYSIS: Primary | ICD-10-CM

## 2023-09-15 DIAGNOSIS — N18.6 ESRD ON HEMODIALYSIS: Primary | ICD-10-CM

## 2023-09-15 PROCEDURE — 99214 OFFICE O/P EST MOD 30 MIN: CPT | Performed by: SURGERY

## 2023-09-15 PROCEDURE — 1159F MED LIST DOCD IN RCRD: CPT | Performed by: SURGERY

## 2023-09-15 PROCEDURE — 3079F DIAST BP 80-89 MM HG: CPT | Performed by: SURGERY

## 2023-09-15 PROCEDURE — 3077F SYST BP >= 140 MM HG: CPT | Performed by: SURGERY

## 2023-09-15 PROCEDURE — 1160F RVW MEDS BY RX/DR IN RCRD: CPT | Performed by: SURGERY

## 2023-09-15 RX ORDER — SEVELAMER CARBONATE 800 MG/1
1600 TABLET, FILM COATED ORAL 3 TIMES DAILY
Qty: 180 TABLET | Refills: 11 | COMMUNITY
Start: 2022-12-08 | End: 2023-12-08

## 2023-09-15 RX ORDER — ONDANSETRON 2 MG/ML
4 INJECTION INTRAMUSCULAR; INTRAVENOUS EVERY 6 HOURS PRN
Status: CANCELLED | OUTPATIENT
Start: 2023-09-15

## 2023-09-15 RX ORDER — TAMSULOSIN HYDROCHLORIDE 0.4 MG/1
1 CAPSULE ORAL DAILY
COMMUNITY
Start: 2023-08-11

## 2023-09-15 RX ORDER — OXCARBAZEPINE 150 MG/1
150 TABLET, FILM COATED ORAL
COMMUNITY
Start: 2023-05-03 | End: 2023-09-15

## 2023-09-15 RX ORDER — ONDANSETRON 4 MG/1
1 TABLET, FILM COATED ORAL EVERY 6 HOURS
COMMUNITY
Start: 2023-09-05

## 2023-09-15 NOTE — H&P (VIEW-ONLY)
Chief Complaint   Patient presents with    PD Cath Consult       Subjective      Jai Gayle Jr. is a 43 y.o. male who is referred by Yuli Sykes MD to be evaluated for peritoneal dialysis catheter placement. Patient has ESRD secondary to diabetic nephropathy and  is on dialysis. He gets dialysis on Tuesday, Thursday, and Saturday . Patient does have a AV access.  Patient has not had a recent intraabdominal infection. The patient reports having regular bowel movements.  Patient did not have a Colonoscopy.     Home Evaluation: No    Past Medical History:   Diagnosis Date    Anxiety     BPH (benign prostatic hyperplasia)     COPD (chronic obstructive pulmonary disease)     Depression     Diabetes     ESRD (end stage renal disease)     DIALYSIS TUES,THUR AND SAT    Heart disease     History of seizure     LAST ONE 3 WEEKS AGO    Hyperlipidemia     Hypertension     Keratosis pilaris     MI (myocardial infarction) 08/2020    PONV (postoperative nausea and vomiting)     HAS HAD SURGERY MORE RECENT WITHOUT N/V.    Stroke     Substance abuse 2005       Past Surgical History:   Procedure Laterality Date    ARTERIOVENOUS FISTULA/SHUNT SURGERY Left 12/15/2021    Procedure: LEFT ARM BRACHIOCEPHALIC ARTERIOVENOUS FISTULA PLACEMENT;  Surgeon: Devon Waller MD;  Location: [facility] OR;  Service: Vascular;  Laterality: Left;    CARDIAC CATHETERIZATION N/A 08/08/2020    Procedure: Left Heart Cath;  Surgeon: Jason Carvalho MD;  Location: [facility] CATH INVASIVE LOCATION;  Service: Cardiovascular;  Laterality: N/A;    CARDIAC CATHETERIZATION N/A 08/08/2020    Procedure: Coronary angiography;  Surgeon: Jason Carvalho MD;  Location: [facility] CATH INVASIVE LOCATION;  Service: Cardiovascular;  Laterality: N/A;    CARDIAC CATHETERIZATION N/A 08/08/2020    Procedure: Left ventriculography;  Surgeon: Jason Carvalho MD;  Location: [facility] CATH INVASIVE LOCATION;  Service: Cardiovascular;  Laterality:  N/A;    CARDIAC CATHETERIZATION N/A 08/08/2020    Procedure: Stent MEE coronary;  Surgeon: Jason Carvalho MD;  Location: [facility] CATH INVASIVE LOCATION;  Service: Cardiovascular;  Laterality: N/A;    INSERTION HEMODIALYSIS CATHETER      RENAL BIOPSY Left          Current Outpatient Medications:     amLODIPine (NORVASC) 10 MG tablet, TAKE 1 TABLET BY MOUTH DAILY (Patient taking differently: Take 1 tablet by mouth Daily. TAKES MONDAY WEDNESDAY FRIDAY BID TUES THURS SAT HOLD SUNDAY - AM), Disp: 30 tablet, Rfl: 3    asenapine maleate (SAPHRIS) 10 MG sublingual tablet sublingual tablet, Place 2 tablets under the tongue Every Night., Disp: , Rfl:     aspirin 81 MG EC tablet, Take 1 tablet by mouth Daily., Disp: 30 tablet, Rfl: 5    atorvastatin (LIPITOR) 80 MG tablet, TAKE 1 TABLET BY MOUTH EVERY NIGHT AT BEDTIME, Disp: 90 tablet, Rfl: 0    buPROPion SR (WELLBUTRIN SR) 200 MG 12 hr tablet, Take 1 tablet by mouth 2 (Two) Times a Day., Disp: , Rfl:     DULoxetine (CYMBALTA) 60 MG capsule, Take 1 capsule by mouth 2 (Two) Times a Day., Disp: , Rfl:     FLUoxetine (PROzac) 20 MG tablet, Take 5 tablets by mouth Daily., Disp: , Rfl:     lamoTRIgine (LaMICtal) 200 MG tablet, Take 1 tablet by mouth Daily., Disp: , Rfl:     metoprolol tartrate (LOPRESSOR) 25 MG tablet, TAKE 1 TABLET BY MOUTH EVERY 12 HOURS (Patient taking differently: 2 tablets.), Disp: 180 tablet, Rfl: 1    ondansetron (ZOFRAN) 4 MG tablet, Take 1 tablet by mouth Every 6 (Six) Hours., Disp: , Rfl:     sevelamer (RENVELA) 800 MG tablet, Take 2 tablets by mouth 3 (Three) Times a Day., Disp: 180 tablet, Rfl: 11    tamsulosin (FLOMAX) 0.4 MG capsule 24 hr capsule, Take 1 capsule by mouth Daily., Disp: , Rfl:     torsemide (DEMADEX) 100 MG tablet, Take 2 tablets by mouth 2 (Two) Times a Day., Disp: , Rfl:     CHLORHEXIDINE GLUCONATE CLOTH EX, Apply  topically. AS DIRECTED PREOP, Disp: , Rfl:     hydrALAZINE (APRESOLINE) 100 MG tablet, Take 1 tablet by mouth 3  (Three) Times a Day. TAKES BID ON TUES. THURS AND SAT., Disp: , Rfl:     No Known Allergies    Family History   Problem Relation Age of Onset    Hypertension Mother     Hyperlipidemia Mother     Heart disease Mother     Heart attack Mother     Diabetes Mother     Colon cancer Mother     Cancer Mother     COPD Father     Heart failure Neg Hx     Malig Hyperthermia Neg Hx        Social History     Socioeconomic History    Marital status: Single   Tobacco Use    Smoking status: Every Day     Packs/day: 0.25     Years: 39.00     Pack years: 9.75     Types: Cigarettes    Smokeless tobacco: Never   Vaping Use    Vaping Use: Never used   Substance and Sexual Activity    Alcohol use: No    Drug use: Not Currently     Types: Amphetamines, Cocaine(coke), LSD, Marijuana, MDMA (ecstacy)     Comment: CLEAN FOR 2011    Sexual activity: Not Currently     Partners: Female     Birth control/protection: Condom     REVIEW OF SYSTEMS    Review of Systems   Constitutional:  Negative for activity change and appetite change.   HENT:  Negative for congestion and nosebleeds.    Eyes:  Negative for discharge and itching.   Respiratory:  Negative for apnea, cough and choking.    Cardiovascular:  Negative for chest pain and palpitations.   Gastrointestinal:  Negative for abdominal distention, abdominal pain and constipation.   Endocrine: Negative for cold intolerance and heat intolerance.   Genitourinary:  Negative for difficulty urinating and hematuria.   Musculoskeletal:  Negative for arthralgias, gait problem and joint swelling.   Skin:  Negative for color change and pallor.   Allergic/Immunologic: Negative for environmental allergies and food allergies.   Neurological:  Negative for dizziness and light-headedness.   Hematological:  Negative for adenopathy. Does not bruise/bleed easily.   Psychiatric/Behavioral:  Negative for agitation and decreased concentration. The patient is nervous/anxious.      Physical Examination  /82   Ht  "185.4 cm (73\")   Wt 100 kg (221 lb)   BMI 29.16 kg/m²   Body mass index is 29.16 kg/m².  Physical Exam  Constitutional:       Appearance: Normal appearance.   HENT:      Head: Normocephalic and atraumatic.      Nose: Nose normal.      Mouth/Throat:      Mouth: Mucous membranes are moist.      Pharynx: Oropharynx is clear.   Eyes:      General: No scleral icterus.     Conjunctiva/sclera: Conjunctivae normal.   Cardiovascular:      Rate and Rhythm: Normal rate and regular rhythm.   Pulmonary:      Effort: Pulmonary effort is normal.   Abdominal:      General: Abdomen is flat. Bowel sounds are normal.      Palpations: Abdomen is soft. There is no mass.      Hernia: No hernia is present.   Musculoskeletal:         General: Normal range of motion.      Cervical back: Normal range of motion and neck supple.   Skin:     General: Skin is warm and dry.      Capillary Refill: Capillary refill takes less than 2 seconds.   Neurological:      General: No focal deficit present.      Mental Status: He is alert and oriented to person, place, and time.   Psychiatric:         Mood and Affect: Mood normal.         Behavior: Behavior normal.       Labs 8/22/2023:   White blood cell count 10.2, hemoglobin 9.4, platelets 221    Assessment:   Jai Gayle Jr. is a 43 y.o. male with ESRD due to  diabetic nephropathy that is not on dialysis and will need peritoneal dialysis catheter placement.       The procedure was explained in detail to the patient including risks and benefits.  The benefits including the possibility of having dialysis at home without the side effects of the hemodialysis.  The risks including but not limited to catheter dislodgment, obstruction, malfunction, bleeding, infection and possible injury to surrounding organs during peritoneal access. He is interested in proceeding with laparoscopic placement of peritoneal dialysis catheter. The patient understands that the catheter will not be used for dialysis for a " period of approximately 2 weeks after its placement and that during this time they should not shower until the exit site is completely healed. The patient underwent at least one training session with the peritoneal dialysis nurse but his house has not been evaluated yet.  Patient verbalized understanding and agreed with the plan. All questions were answered at this time.    Discussed with him about the need to get dialysis nurses to check his house prior to surgery.  He understood      Plan:     - Laparoscopic peritoneal dialysis catheter placement, possible open.  - Preparation for surgery orders have been placed  - Surgery scheduling.     Orders Placed This Encounter   Procedures    Obtain Informed Consent     Standing Status:   Future     Order Specific Question:   Informed Consent Given For     Answer:   Laparoscopic peritoneal dialysis catheter placement    Provide NPO Instructions to Patient     Standing Status:   Future    Chlorhexidine Skin Prep     Chlorhexidine Skin Prep and Instructions For All Patients Having A Procedure Requiring an Outward Incision if Not Allergic. If Allergic, Give Antibacterial Skin Wipes and Instructions. Do Not Use For Facial Cases or on Any Mucus Membranes.     Standing Status:   Future         Vamsi Regan MD  General, Minimally Invasive and Endoscopic Surgery  [facility]    [address], [address]  [place], [place], [address]  P: [phone]  F: [phone]

## 2023-09-15 NOTE — PROGRESS NOTES
Chief Complaint   Patient presents with    PD Cath Consult       Subjective      Jai Gayle Jr. is a 43 y.o. male who is referred by Yuli Sykes MD to be evaluated for peritoneal dialysis catheter placement. Patient has ESRD secondary to diabetic nephropathy and  is on dialysis. He gets dialysis on Tuesday, Thursday, and Saturday . Patient does have a AV access.  Patient has not had a recent intraabdominal infection. The patient reports having regular bowel movements.  Patient did not have a Colonoscopy.     Home Evaluation: No    Past Medical History:   Diagnosis Date    Anxiety     BPH (benign prostatic hyperplasia)     COPD (chronic obstructive pulmonary disease)     Depression     Diabetes     ESRD (end stage renal disease)     DIALYSIS TUES,THUR AND SAT    Heart disease     History of seizure     LAST ONE 3 WEEKS AGO    Hyperlipidemia     Hypertension     Keratosis pilaris     MI (myocardial infarction) 08/2020    PONV (postoperative nausea and vomiting)     HAS HAD SURGERY MORE RECENT WITHOUT N/V.    Stroke     Substance abuse 2005       Past Surgical History:   Procedure Laterality Date    ARTERIOVENOUS FISTULA/SHUNT SURGERY Left 12/15/2021    Procedure: LEFT ARM BRACHIOCEPHALIC ARTERIOVENOUS FISTULA PLACEMENT;  Surgeon: Devon Waller MD;  Location: [facility] OR;  Service: Vascular;  Laterality: Left;    CARDIAC CATHETERIZATION N/A 08/08/2020    Procedure: Left Heart Cath;  Surgeon: Jason Carvalho MD;  Location: [facility] CATH INVASIVE LOCATION;  Service: Cardiovascular;  Laterality: N/A;    CARDIAC CATHETERIZATION N/A 08/08/2020    Procedure: Coronary angiography;  Surgeon: Jason Carvalho MD;  Location: [facility] CATH INVASIVE LOCATION;  Service: Cardiovascular;  Laterality: N/A;    CARDIAC CATHETERIZATION N/A 08/08/2020    Procedure: Left ventriculography;  Surgeon: Jason Carvalho MD;  Location: [facility] CATH INVASIVE LOCATION;  Service: Cardiovascular;  Laterality:  N/A;    CARDIAC CATHETERIZATION N/A 08/08/2020    Procedure: Stent MEE coronary;  Surgeon: Jason Carvalho MD;  Location: [facility] CATH INVASIVE LOCATION;  Service: Cardiovascular;  Laterality: N/A;    INSERTION HEMODIALYSIS CATHETER      RENAL BIOPSY Left          Current Outpatient Medications:     amLODIPine (NORVASC) 10 MG tablet, TAKE 1 TABLET BY MOUTH DAILY (Patient taking differently: Take 1 tablet by mouth Daily. TAKES MONDAY WEDNESDAY FRIDAY BID TUES THURS SAT HOLD SUNDAY - AM), Disp: 30 tablet, Rfl: 3    asenapine maleate (SAPHRIS) 10 MG sublingual tablet sublingual tablet, Place 2 tablets under the tongue Every Night., Disp: , Rfl:     aspirin 81 MG EC tablet, Take 1 tablet by mouth Daily., Disp: 30 tablet, Rfl: 5    atorvastatin (LIPITOR) 80 MG tablet, TAKE 1 TABLET BY MOUTH EVERY NIGHT AT BEDTIME, Disp: 90 tablet, Rfl: 0    buPROPion SR (WELLBUTRIN SR) 200 MG 12 hr tablet, Take 1 tablet by mouth 2 (Two) Times a Day., Disp: , Rfl:     DULoxetine (CYMBALTA) 60 MG capsule, Take 1 capsule by mouth 2 (Two) Times a Day., Disp: , Rfl:     FLUoxetine (PROzac) 20 MG tablet, Take 5 tablets by mouth Daily., Disp: , Rfl:     lamoTRIgine (LaMICtal) 200 MG tablet, Take 1 tablet by mouth Daily., Disp: , Rfl:     metoprolol tartrate (LOPRESSOR) 25 MG tablet, TAKE 1 TABLET BY MOUTH EVERY 12 HOURS (Patient taking differently: 2 tablets.), Disp: 180 tablet, Rfl: 1    ondansetron (ZOFRAN) 4 MG tablet, Take 1 tablet by mouth Every 6 (Six) Hours., Disp: , Rfl:     sevelamer (RENVELA) 800 MG tablet, Take 2 tablets by mouth 3 (Three) Times a Day., Disp: 180 tablet, Rfl: 11    tamsulosin (FLOMAX) 0.4 MG capsule 24 hr capsule, Take 1 capsule by mouth Daily., Disp: , Rfl:     torsemide (DEMADEX) 100 MG tablet, Take 2 tablets by mouth 2 (Two) Times a Day., Disp: , Rfl:     CHLORHEXIDINE GLUCONATE CLOTH EX, Apply  topically. AS DIRECTED PREOP, Disp: , Rfl:     hydrALAZINE (APRESOLINE) 100 MG tablet, Take 1 tablet by mouth 3  (Three) Times a Day. TAKES BID ON TUES. THURS AND SAT., Disp: , Rfl:     No Known Allergies    Family History   Problem Relation Age of Onset    Hypertension Mother     Hyperlipidemia Mother     Heart disease Mother     Heart attack Mother     Diabetes Mother     Colon cancer Mother     Cancer Mother     COPD Father     Heart failure Neg Hx     Malig Hyperthermia Neg Hx        Social History     Socioeconomic History    Marital status: Single   Tobacco Use    Smoking status: Every Day     Packs/day: 0.25     Years: 39.00     Pack years: 9.75     Types: Cigarettes    Smokeless tobacco: Never   Vaping Use    Vaping Use: Never used   Substance and Sexual Activity    Alcohol use: No    Drug use: Not Currently     Types: Amphetamines, Cocaine(coke), LSD, Marijuana, MDMA (ecstacy)     Comment: CLEAN FOR 2011    Sexual activity: Not Currently     Partners: Female     Birth control/protection: Condom     REVIEW OF SYSTEMS    Review of Systems   Constitutional:  Negative for activity change and appetite change.   HENT:  Negative for congestion and nosebleeds.    Eyes:  Negative for discharge and itching.   Respiratory:  Negative for apnea, cough and choking.    Cardiovascular:  Negative for chest pain and palpitations.   Gastrointestinal:  Negative for abdominal distention, abdominal pain and constipation.   Endocrine: Negative for cold intolerance and heat intolerance.   Genitourinary:  Negative for difficulty urinating and hematuria.   Musculoskeletal:  Negative for arthralgias, gait problem and joint swelling.   Skin:  Negative for color change and pallor.   Allergic/Immunologic: Negative for environmental allergies and food allergies.   Neurological:  Negative for dizziness and light-headedness.   Hematological:  Negative for adenopathy. Does not bruise/bleed easily.   Psychiatric/Behavioral:  Negative for agitation and decreased concentration. The patient is nervous/anxious.      Physical Examination  /82   Ht  "185.4 cm (73\")   Wt 100 kg (221 lb)   BMI 29.16 kg/m²   Body mass index is 29.16 kg/m².  Physical Exam  Constitutional:       Appearance: Normal appearance.   HENT:      Head: Normocephalic and atraumatic.      Nose: Nose normal.      Mouth/Throat:      Mouth: Mucous membranes are moist.      Pharynx: Oropharynx is clear.   Eyes:      General: No scleral icterus.     Conjunctiva/sclera: Conjunctivae normal.   Cardiovascular:      Rate and Rhythm: Normal rate and regular rhythm.   Pulmonary:      Effort: Pulmonary effort is normal.   Abdominal:      General: Abdomen is flat. Bowel sounds are normal.      Palpations: Abdomen is soft. There is no mass.      Hernia: No hernia is present.   Musculoskeletal:         General: Normal range of motion.      Cervical back: Normal range of motion and neck supple.   Skin:     General: Skin is warm and dry.      Capillary Refill: Capillary refill takes less than 2 seconds.   Neurological:      General: No focal deficit present.      Mental Status: He is alert and oriented to person, place, and time.   Psychiatric:         Mood and Affect: Mood normal.         Behavior: Behavior normal.       Labs 8/22/2023:   White blood cell count 10.2, hemoglobin 9.4, platelets 221    Assessment:   Jai Gayle Jr. is a 43 y.o. male with ESRD due to  diabetic nephropathy that is not on dialysis and will need peritoneal dialysis catheter placement.       The procedure was explained in detail to the patient including risks and benefits.  The benefits including the possibility of having dialysis at home without the side effects of the hemodialysis.  The risks including but not limited to catheter dislodgment, obstruction, malfunction, bleeding, infection and possible injury to surrounding organs during peritoneal access. He is interested in proceeding with laparoscopic placement of peritoneal dialysis catheter. The patient understands that the catheter will not be used for dialysis for a " period of approximately 2 weeks after its placement and that during this time they should not shower until the exit site is completely healed. The patient underwent at least one training session with the peritoneal dialysis nurse but his house has not been evaluated yet.  Patient verbalized understanding and agreed with the plan. All questions were answered at this time.    Discussed with him about the need to get dialysis nurses to check his house prior to surgery.  He understood      Plan:     - Laparoscopic peritoneal dialysis catheter placement, possible open.  - Preparation for surgery orders have been placed  - Surgery scheduling.     Orders Placed This Encounter   Procedures    Obtain Informed Consent     Standing Status:   Future     Order Specific Question:   Informed Consent Given For     Answer:   Laparoscopic peritoneal dialysis catheter placement    Provide NPO Instructions to Patient     Standing Status:   Future    Chlorhexidine Skin Prep     Chlorhexidine Skin Prep and Instructions For All Patients Having A Procedure Requiring an Outward Incision if Not Allergic. If Allergic, Give Antibacterial Skin Wipes and Instructions. Do Not Use For Facial Cases or on Any Mucus Membranes.     Standing Status:   Future         Vamsi Regan MD  General, Minimally Invasive and Endoscopic Surgery  [facility]    [address], [address]  [place], [place], [address]  P: [phone]  F: [phone]

## 2023-09-18 ENCOUNTER — HOSPITAL ENCOUNTER (OUTPATIENT)
Facility: HOSPITAL | Age: 43
Setting detail: HOSPITAL OUTPATIENT SURGERY
Discharge: HOME | End: 2023-09-18
Attending: SURGERY | Admitting: SURGERY

## 2023-09-18 ENCOUNTER — ANESTHESIA (OUTPATIENT)
Dept: PERIOP | Facility: HOSPITAL | Age: 43
End: 2023-09-18
Payer: MEDICARE

## 2023-09-18 ENCOUNTER — ANESTHESIA EVENT (OUTPATIENT)
Dept: PERIOP | Facility: HOSPITAL | Age: 43
End: 2023-09-18
Payer: MEDICARE

## 2023-09-18 VITALS
SYSTOLIC BLOOD PRESSURE: 163 MMHG | HEIGHT: 71 IN | TEMPERATURE: 98.2 F | DIASTOLIC BLOOD PRESSURE: 95 MMHG | WEIGHT: 220 LBS | BODY MASS INDEX: 30.8 KG/M2 | RESPIRATION RATE: 16 BRPM | OXYGEN SATURATION: 94 % | HEART RATE: 86 BPM

## 2023-09-18 DIAGNOSIS — N18.6 ESRD ON HEMODIALYSIS: ICD-10-CM

## 2023-09-18 DIAGNOSIS — Z99.2 PERITONEAL DIALYSIS CATHETER IN PLACE: Primary | ICD-10-CM

## 2023-09-18 DIAGNOSIS — Z99.2 ESRD ON HEMODIALYSIS: ICD-10-CM

## 2023-09-18 LAB
ANION GAP SERPL CALCULATED.3IONS-SCNC: 17.4 MMOL/L (ref 5–15)
BUN SERPL-MCNC: 55 MG/DL (ref 6–20)
BUN/CREAT SERPL: 7.6 (ref 7–25)
CALCIUM SPEC-SCNC: 8.8 MG/DL (ref 8.6–10.5)
CHLORIDE SERPL-SCNC: 100 MMOL/L (ref 98–107)
CO2 SERPL-SCNC: 19.6 MMOL/L (ref 22–29)
CREAT SERPL-MCNC: 7.26 MG/DL (ref 0.76–1.27)
DEPRECATED RDW RBC AUTO: 41.7 FL (ref 37–54)
EGFRCR SERPLBLD CKD-EPI 2021: 8.8 ML/MIN/1.73
ERYTHROCYTE [DISTWIDTH] IN BLOOD BY AUTOMATED COUNT: 12.6 % (ref 12.3–15.4)
GLUCOSE BLDC GLUCOMTR-MCNC: 124 MG/DL (ref 70–130)
GLUCOSE BLDC GLUCOMTR-MCNC: 94 MG/DL (ref 70–130)
GLUCOSE SERPL-MCNC: 93 MG/DL (ref 65–99)
HCT VFR BLD AUTO: 29.6 % (ref 37.5–51)
HGB BLD-MCNC: 9.8 G/DL (ref 13–17.7)
MCH RBC QN AUTO: 30.4 PG (ref 26.6–33)
MCHC RBC AUTO-ENTMCNC: 33.1 G/DL (ref 31.5–35.7)
MCV RBC AUTO: 91.9 FL (ref 79–97)
PLATELET # BLD AUTO: 113 10*3/MM3 (ref 140–450)
PMV BLD AUTO: 11 FL (ref 6–12)
POTASSIUM SERPL-SCNC: 4.8 MMOL/L (ref 3.5–5.2)
RBC # BLD AUTO: 3.22 10*6/MM3 (ref 4.14–5.8)
SODIUM SERPL-SCNC: 137 MMOL/L (ref 136–145)
WBC NRBC COR # BLD: 5 10*3/MM3 (ref 3.4–10.8)

## 2023-09-18 PROCEDURE — 25010000002 FENTANYL CITRATE (PF) 50 MCG/ML SOLUTION: Performed by: REGISTERED NURSE

## 2023-09-18 PROCEDURE — 25010000002 HYDROMORPHONE PER 4 MG: Performed by: REGISTERED NURSE

## 2023-09-18 PROCEDURE — 85027 COMPLETE CBC AUTOMATED: CPT | Performed by: SURGERY

## 2023-09-18 PROCEDURE — 82948 REAGENT STRIP/BLOOD GLUCOSE: CPT

## 2023-09-18 PROCEDURE — 25010000002 VANCOMYCIN 10 G RECONSTITUTED SOLUTION: Performed by: SURGERY

## 2023-09-18 PROCEDURE — 49650 LAP ING HERNIA REPAIR INIT: CPT

## 2023-09-18 PROCEDURE — 25010000002 ONDANSETRON PER 1 MG: Performed by: REGISTERED NURSE

## 2023-09-18 PROCEDURE — 63710000001 PROMETHAZINE PER 25 MG: Performed by: SURGERY

## 2023-09-18 PROCEDURE — 49650 LAP ING HERNIA REPAIR INIT: CPT | Performed by: SURGERY

## 2023-09-18 PROCEDURE — 49324 LAP INSERT TUNNEL IP CATH: CPT

## 2023-09-18 PROCEDURE — 25010000002 PROPOFOL 10 MG/ML EMULSION: Performed by: REGISTERED NURSE

## 2023-09-18 PROCEDURE — C1781 MESH (IMPLANTABLE): HCPCS | Performed by: SURGERY

## 2023-09-18 PROCEDURE — 25010000002 DEXAMETHASONE SODIUM PHOSPHATE 20 MG/5ML SOLUTION: Performed by: REGISTERED NURSE

## 2023-09-18 PROCEDURE — 25010000002 DROPERIDOL PER 5 MG: Performed by: REGISTERED NURSE

## 2023-09-18 PROCEDURE — 49324 LAP INSERT TUNNEL IP CATH: CPT | Performed by: SURGERY

## 2023-09-18 PROCEDURE — 80048 BASIC METABOLIC PNL TOTAL CA: CPT | Performed by: SURGERY

## 2023-09-18 PROCEDURE — 25010000002 HEPARIN (PORCINE) PER 1000 UNITS: Performed by: SURGERY

## 2023-09-18 PROCEDURE — 25010000002 SUGAMMADEX 200 MG/2ML SOLUTION: Performed by: REGISTERED NURSE

## 2023-09-18 PROCEDURE — C1750 CATH, HEMODIALYSIS,LONG-TERM: HCPCS | Performed by: SURGERY

## 2023-09-18 PROCEDURE — 25010000002 PHENYLEPHRINE 10 MG/ML SOLUTION: Performed by: REGISTERED NURSE

## 2023-09-18 DEVICE — 3DMAX™ MID ANATOMICAL MESH, LARGE, RIGHT, 4” X 6”, 10 X 16 CM
Type: IMPLANTABLE DEVICE | Site: ABDOMEN | Status: FUNCTIONAL
Brand: 3DMAX™ MID ANATOMICAL MESH

## 2023-09-18 DEVICE — FIXATION DEVICE;15 VIOLET ABSORBABLE TACKS
Type: IMPLANTABLE DEVICE | Site: ABDOMEN | Status: FUNCTIONAL
Brand: ABSORBATACK

## 2023-09-18 DEVICE — KNOTLESS TISSUE CONTROL DEVICE, UNDYED UNIDIRECTIONAL (ANTIBACTERIAL) SYNTHETIC ABSORBABLE DEVICE
Type: IMPLANTABLE DEVICE | Site: ABDOMEN | Status: FUNCTIONAL
Brand: STRATAFIX

## 2023-09-18 DEVICE — PERITONEAL DIALYSIS CATHETER SWAN NECK CURL CATH, 2 CUFFS LEFT
Type: IMPLANTABLE DEVICE | Site: ABDOMEN | Status: FUNCTIONAL
Brand: ARGYLE

## 2023-09-18 RX ORDER — SODIUM CHLORIDE 0.9 % (FLUSH) 0.9 %
3-10 SYRINGE (ML) INJECTION AS NEEDED
Status: DISCONTINUED | OUTPATIENT
Start: 2023-09-18 | End: 2023-09-18 | Stop reason: HOSPADM

## 2023-09-18 RX ORDER — FENTANYL CITRATE 50 UG/ML
50 INJECTION, SOLUTION INTRAMUSCULAR; INTRAVENOUS ONCE AS NEEDED
Status: DISCONTINUED | OUTPATIENT
Start: 2023-09-18 | End: 2023-09-18 | Stop reason: HOSPADM

## 2023-09-18 RX ORDER — PROMETHAZINE HYDROCHLORIDE 25 MG/1
25 TABLET ORAL ONCE AS NEEDED
Status: DISCONTINUED | OUTPATIENT
Start: 2023-09-18 | End: 2023-09-18 | Stop reason: HOSPADM

## 2023-09-18 RX ORDER — HYDROMORPHONE HYDROCHLORIDE 1 MG/ML
0.5 INJECTION, SOLUTION INTRAMUSCULAR; INTRAVENOUS; SUBCUTANEOUS
Status: DISCONTINUED | OUTPATIENT
Start: 2023-09-18 | End: 2023-09-18 | Stop reason: HOSPADM

## 2023-09-18 RX ORDER — DIPHENHYDRAMINE HYDROCHLORIDE 50 MG/ML
12.5 INJECTION INTRAMUSCULAR; INTRAVENOUS
Status: DISCONTINUED | OUTPATIENT
Start: 2023-09-18 | End: 2023-09-18 | Stop reason: HOSPADM

## 2023-09-18 RX ORDER — SODIUM CHLORIDE 0.9 % (FLUSH) 0.9 %
3 SYRINGE (ML) INJECTION EVERY 12 HOURS SCHEDULED
Status: DISCONTINUED | OUTPATIENT
Start: 2023-09-18 | End: 2023-09-18 | Stop reason: HOSPADM

## 2023-09-18 RX ORDER — FENTANYL CITRATE 50 UG/ML
50 INJECTION, SOLUTION INTRAMUSCULAR; INTRAVENOUS
Status: DISCONTINUED | OUTPATIENT
Start: 2023-09-18 | End: 2023-09-18 | Stop reason: HOSPADM

## 2023-09-18 RX ORDER — BUPIVACAINE HYDROCHLORIDE AND EPINEPHRINE 5; 5 MG/ML; UG/ML
INJECTION, SOLUTION EPIDURAL; INTRACAUDAL; PERINEURAL AS NEEDED
Status: DISCONTINUED | OUTPATIENT
Start: 2023-09-18 | End: 2023-09-18 | Stop reason: HOSPADM

## 2023-09-18 RX ORDER — DEXAMETHASONE SODIUM PHOSPHATE 4 MG/ML
INJECTION, SOLUTION INTRA-ARTICULAR; INTRALESIONAL; INTRAMUSCULAR; INTRAVENOUS; SOFT TISSUE AS NEEDED
Status: DISCONTINUED | OUTPATIENT
Start: 2023-09-18 | End: 2023-09-18 | Stop reason: SURG

## 2023-09-18 RX ORDER — PROMETHAZINE HYDROCHLORIDE 25 MG/1
25 SUPPOSITORY RECTAL ONCE AS NEEDED
Status: DISCONTINUED | OUTPATIENT
Start: 2023-09-18 | End: 2023-09-18 | Stop reason: HOSPADM

## 2023-09-18 RX ORDER — HYDRALAZINE HYDROCHLORIDE 20 MG/ML
5 INJECTION INTRAMUSCULAR; INTRAVENOUS
Status: DISCONTINUED | OUTPATIENT
Start: 2023-09-18 | End: 2023-09-18 | Stop reason: HOSPADM

## 2023-09-18 RX ORDER — ONDANSETRON 2 MG/ML
4 INJECTION INTRAMUSCULAR; INTRAVENOUS ONCE AS NEEDED
Status: DISCONTINUED | OUTPATIENT
Start: 2023-09-18 | End: 2023-09-18 | Stop reason: HOSPADM

## 2023-09-18 RX ORDER — MIDAZOLAM HYDROCHLORIDE 1 MG/ML
1 INJECTION INTRAMUSCULAR; INTRAVENOUS
Status: DISCONTINUED | OUTPATIENT
Start: 2023-09-18 | End: 2023-09-18 | Stop reason: HOSPADM

## 2023-09-18 RX ORDER — ROCURONIUM BROMIDE 10 MG/ML
INJECTION, SOLUTION INTRAVENOUS AS NEEDED
Status: DISCONTINUED | OUTPATIENT
Start: 2023-09-18 | End: 2023-09-18 | Stop reason: SURG

## 2023-09-18 RX ORDER — NALOXONE HCL 0.4 MG/ML
0.2 VIAL (ML) INJECTION AS NEEDED
Status: DISCONTINUED | OUTPATIENT
Start: 2023-09-18 | End: 2023-09-18 | Stop reason: HOSPADM

## 2023-09-18 RX ORDER — OXYCODONE HYDROCHLORIDE AND ACETAMINOPHEN 5; 325 MG/1; MG/1
1 TABLET ORAL ONCE AS NEEDED
Status: DISCONTINUED | OUTPATIENT
Start: 2023-09-18 | End: 2023-09-18 | Stop reason: HOSPADM

## 2023-09-18 RX ORDER — FLUMAZENIL 0.1 MG/ML
0.2 INJECTION INTRAVENOUS AS NEEDED
Status: DISCONTINUED | OUTPATIENT
Start: 2023-09-18 | End: 2023-09-18 | Stop reason: HOSPADM

## 2023-09-18 RX ORDER — PROMETHAZINE HYDROCHLORIDE 25 MG/1
12.5 TABLET ORAL ONCE AS NEEDED
Status: COMPLETED | OUTPATIENT
Start: 2023-09-18 | End: 2023-09-18

## 2023-09-18 RX ORDER — DROPERIDOL 2.5 MG/ML
0.62 INJECTION, SOLUTION INTRAMUSCULAR; INTRAVENOUS
Status: DISCONTINUED | OUTPATIENT
Start: 2023-09-18 | End: 2023-09-18 | Stop reason: HOSPADM

## 2023-09-18 RX ORDER — EPHEDRINE SULFATE 50 MG/ML
5 INJECTION, SOLUTION INTRAVENOUS ONCE AS NEEDED
Status: DISCONTINUED | OUTPATIENT
Start: 2023-09-18 | End: 2023-09-18 | Stop reason: HOSPADM

## 2023-09-18 RX ORDER — LABETALOL HYDROCHLORIDE 5 MG/ML
5 INJECTION, SOLUTION INTRAVENOUS
Status: DISCONTINUED | OUTPATIENT
Start: 2023-09-18 | End: 2023-09-18 | Stop reason: HOSPADM

## 2023-09-18 RX ORDER — HYDROCODONE BITARTRATE AND ACETAMINOPHEN 7.5; 325 MG/1; MG/1
1 TABLET ORAL ONCE AS NEEDED
Status: DISCONTINUED | OUTPATIENT
Start: 2023-09-18 | End: 2023-09-18 | Stop reason: HOSPADM

## 2023-09-18 RX ORDER — SODIUM CHLORIDE, SODIUM LACTATE, POTASSIUM CHLORIDE, CALCIUM CHLORIDE 600; 310; 30; 20 MG/100ML; MG/100ML; MG/100ML; MG/100ML
9 INJECTION, SOLUTION INTRAVENOUS CONTINUOUS
Status: DISCONTINUED | OUTPATIENT
Start: 2023-09-18 | End: 2023-09-18 | Stop reason: HOSPADM

## 2023-09-18 RX ORDER — OXYCODONE AND ACETAMINOPHEN 7.5; 325 MG/1; MG/1
1 TABLET ORAL EVERY 4 HOURS PRN
Status: DISCONTINUED | OUTPATIENT
Start: 2023-09-18 | End: 2023-09-18 | Stop reason: HOSPADM

## 2023-09-18 RX ORDER — FENTANYL CITRATE 50 UG/ML
INJECTION, SOLUTION INTRAMUSCULAR; INTRAVENOUS AS NEEDED
Status: DISCONTINUED | OUTPATIENT
Start: 2023-09-18 | End: 2023-09-18 | Stop reason: SURG

## 2023-09-18 RX ORDER — PHENYLEPHRINE HYDROCHLORIDE 10 MG/ML
INJECTION INTRAVENOUS AS NEEDED
Status: DISCONTINUED | OUTPATIENT
Start: 2023-09-18 | End: 2023-09-18 | Stop reason: SURG

## 2023-09-18 RX ORDER — ONDANSETRON 2 MG/ML
INJECTION INTRAMUSCULAR; INTRAVENOUS AS NEEDED
Status: DISCONTINUED | OUTPATIENT
Start: 2023-09-18 | End: 2023-09-18 | Stop reason: SURG

## 2023-09-18 RX ORDER — IPRATROPIUM BROMIDE AND ALBUTEROL SULFATE 2.5; .5 MG/3ML; MG/3ML
3 SOLUTION RESPIRATORY (INHALATION) ONCE AS NEEDED
Status: DISCONTINUED | OUTPATIENT
Start: 2023-09-18 | End: 2023-09-18 | Stop reason: HOSPADM

## 2023-09-18 RX ORDER — LIDOCAINE HYDROCHLORIDE 20 MG/ML
INJECTION, SOLUTION INFILTRATION; PERINEURAL AS NEEDED
Status: DISCONTINUED | OUTPATIENT
Start: 2023-09-18 | End: 2023-09-18 | Stop reason: SURG

## 2023-09-18 RX ORDER — SODIUM CHLORIDE 9 MG/ML
9 INJECTION, SOLUTION INTRAVENOUS CONTINUOUS
Status: DISCONTINUED | OUTPATIENT
Start: 2023-09-18 | End: 2023-09-18 | Stop reason: HOSPADM

## 2023-09-18 RX ORDER — SENNOSIDES A AND B 8.6 MG/1
1 TABLET, FILM COATED ORAL ONCE
Status: COMPLETED | OUTPATIENT
Start: 2023-09-18 | End: 2023-09-18

## 2023-09-18 RX ORDER — FAMOTIDINE 10 MG/ML
20 INJECTION, SOLUTION INTRAVENOUS ONCE
Status: COMPLETED | OUTPATIENT
Start: 2023-09-18 | End: 2023-09-18

## 2023-09-18 RX ORDER — ONDANSETRON 2 MG/ML
4 INJECTION INTRAMUSCULAR; INTRAVENOUS EVERY 6 HOURS PRN
Status: DISCONTINUED | OUTPATIENT
Start: 2023-09-18 | End: 2023-09-18 | Stop reason: HOSPADM

## 2023-09-18 RX ORDER — LIDOCAINE HYDROCHLORIDE 10 MG/ML
0.5 INJECTION, SOLUTION INFILTRATION; PERINEURAL ONCE AS NEEDED
Status: DISCONTINUED | OUTPATIENT
Start: 2023-09-18 | End: 2023-09-18 | Stop reason: HOSPADM

## 2023-09-18 RX ORDER — OXYCODONE HYDROCHLORIDE AND ACETAMINOPHEN 5; 325 MG/1; MG/1
1 TABLET ORAL EVERY 6 HOURS PRN
Qty: 20 TABLET | Refills: 0 | Status: SHIPPED | OUTPATIENT
Start: 2023-09-18

## 2023-09-18 RX ORDER — PROPOFOL 10 MG/ML
VIAL (ML) INTRAVENOUS AS NEEDED
Status: DISCONTINUED | OUTPATIENT
Start: 2023-09-18 | End: 2023-09-18 | Stop reason: SURG

## 2023-09-18 RX ORDER — ACETAMINOPHEN 325 MG/1
650 TABLET ORAL ONCE
Status: COMPLETED | OUTPATIENT
Start: 2023-09-18 | End: 2023-09-18

## 2023-09-18 RX ORDER — EPHEDRINE SULFATE 50 MG/ML
INJECTION, SOLUTION INTRAVENOUS AS NEEDED
Status: DISCONTINUED | OUTPATIENT
Start: 2023-09-18 | End: 2023-09-18 | Stop reason: SURG

## 2023-09-18 RX ORDER — VANCOMYCIN/0.9 % SOD CHLORIDE 1.5G/250ML
15 PLASTIC BAG, INJECTION (ML) INTRAVENOUS ONCE
Status: COMPLETED | OUTPATIENT
Start: 2023-09-18 | End: 2023-09-18

## 2023-09-18 RX ORDER — AMOXICILLIN 250 MG
2 CAPSULE ORAL DAILY PRN
Qty: 30 TABLET | Refills: 1 | Status: SHIPPED | OUTPATIENT
Start: 2023-09-18 | End: 2024-09-17

## 2023-09-18 RX ADMIN — DEXAMETHASONE SODIUM PHOSPHATE 10 MG: 4 INJECTION, SOLUTION INTRAMUSCULAR; INTRAVENOUS at 11:17

## 2023-09-18 RX ADMIN — EPHEDRINE SULFATE 10 MG: 50 INJECTION, SOLUTION INTRAVENOUS at 12:04

## 2023-09-18 RX ADMIN — DROPERIDOL 0.62 MG: 2.5 INJECTION, SOLUTION INTRAMUSCULAR; INTRAVENOUS at 13:43

## 2023-09-18 RX ADMIN — ROCURONIUM BROMIDE 50 MG: 10 INJECTION, SOLUTION INTRAVENOUS at 11:01

## 2023-09-18 RX ADMIN — ONDANSETRON 4 MG: 2 INJECTION INTRAMUSCULAR; INTRAVENOUS at 11:17

## 2023-09-18 RX ADMIN — PROPOFOL 200 MG: 10 INJECTION, EMULSION INTRAVENOUS at 10:59

## 2023-09-18 RX ADMIN — SUGAMMADEX 200 MG: 100 INJECTION, SOLUTION INTRAVENOUS at 12:51

## 2023-09-18 RX ADMIN — LIDOCAINE HYDROCHLORIDE 100 MG: 20 INJECTION, SOLUTION INFILTRATION; PERINEURAL at 10:59

## 2023-09-18 RX ADMIN — PROPOFOL 50 MG: 10 INJECTION, EMULSION INTRAVENOUS at 12:55

## 2023-09-18 RX ADMIN — FENTANYL CITRATE 50 MCG: 50 INJECTION, SOLUTION INTRAMUSCULAR; INTRAVENOUS at 10:59

## 2023-09-18 RX ADMIN — FAMOTIDINE 20 MG: 10 INJECTION INTRAVENOUS at 09:18

## 2023-09-18 RX ADMIN — SENNOSIDES 1 TABLET: 8.6 TABLET, FILM COATED ORAL at 13:26

## 2023-09-18 RX ADMIN — SODIUM CHLORIDE 9 ML/HR: 9 INJECTION, SOLUTION INTRAVENOUS at 10:24

## 2023-09-18 RX ADMIN — PHENYLEPHRINE HYDROCHLORIDE 100 MCG: 10 INJECTION INTRAVENOUS at 11:55

## 2023-09-18 RX ADMIN — FENTANYL CITRATE 50 MCG: 50 INJECTION, SOLUTION INTRAMUSCULAR; INTRAVENOUS at 13:48

## 2023-09-18 RX ADMIN — ROCURONIUM BROMIDE 20 MG: 10 INJECTION, SOLUTION INTRAVENOUS at 12:12

## 2023-09-18 RX ADMIN — EPHEDRINE SULFATE 10 MG: 50 INJECTION, SOLUTION INTRAVENOUS at 11:49

## 2023-09-18 RX ADMIN — PHENYLEPHRINE HYDROCHLORIDE 100 MCG: 10 INJECTION INTRAVENOUS at 11:59

## 2023-09-18 RX ADMIN — HYDROMORPHONE HYDROCHLORIDE 0.5 MG: 1 INJECTION, SOLUTION INTRAMUSCULAR; INTRAVENOUS; SUBCUTANEOUS at 13:39

## 2023-09-18 RX ADMIN — ACETAMINOPHEN 650 MG: 325 TABLET, FILM COATED ORAL at 13:26

## 2023-09-18 RX ADMIN — VANCOMYCIN HYDROCHLORIDE 1500 MG: 10 INJECTION, POWDER, LYOPHILIZED, FOR SOLUTION INTRAVENOUS at 10:24

## 2023-09-18 RX ADMIN — PROMETHAZINE HYDROCHLORIDE 12.5 MG: 25 TABLET ORAL at 13:46

## 2023-09-18 RX ADMIN — FENTANYL CITRATE 50 MCG: 50 INJECTION, SOLUTION INTRAMUSCULAR; INTRAVENOUS at 13:26

## 2023-09-18 RX ADMIN — OXYCODONE AND ACETAMINOPHEN 1 TABLET: 7.5; 325 TABLET ORAL at 13:26

## 2023-09-18 RX ADMIN — PROPOFOL 200 MG: 10 INJECTION, EMULSION INTRAVENOUS at 11:07

## 2023-09-18 NOTE — PERIOPERATIVE NURSING NOTE
Service consented for peritoneal dialysis catheter placement.Upon entering abdominal cavity, large inguinal hernia noticed. Family member called(father Jai Gayle Sr. )  Inguinal hernia repair with mesh consented by phone by Father to Dr Regan and Nurse Stephanie Juarez.

## 2023-09-18 NOTE — DISCHARGE INSTRUCTIONS
Dr. Vamsi Regan  [address]  [place], [place] [address] [phone]    Discharge Instructions for Hernia Surgery      Go home, rest and take it easy today; however, you should get up and move about several times today to reduce the risk of developing a clot in your legs.      You may experience some dizziness or memory loss from the anesthesia.  This may last for the next 24 hours.  Someone should plan on staying with you for the first 24 hours for your safety.    Do not make any important legal decisions or sign any legal papers for the next 24 hours.      Eat and drink lightly today.  Start off with liquids, jello, soup, crackers or other bland foods at first. You may advance your diet tomorrow as tolerated as long as you do not experience any nausea or vomiting.     You may remove your outer dressings in 3 days.  The white tapes called steri-strips should stay in place.  They will fall off on their own in 1-2 weeks.  Do not worry if they come off sooner.      You may notice some bleeding/drainage on your outer dressings. A little bloody drainage is normal. If the bleeding/drainage is such that the bandage cannot absorb it, remove the dressing, apply clean gauze and apply firm pressure for a full 15 minutes.  If the bleeding continues, please call me.    You may shower tomorrow.  No tub baths until your incisions are completely healed.      No lifting > 20 lbs. until you are seen at your follow-up visit.         You have received a prescription for a narcotic pain medicine, as you will have some pain following surgery.   You will not be totally pain free, but your pain medicine should make the pain tolerable.  Please take your pain medicine as prescribed and always take your pills with food to prevent nausea. If you are having severe pain that cannot be controlled by the pain medicine, please contact me.      You have also received a prescription for an anti-nausea medicine.  Please take this as  prescribed for any nausea or vomiting.  Nausea could be a result of the anesthesia or a result of the narcotic pain medicine.  If you experience severe nausea and vomiting that cannot be controlled by the nausea medicine, please call me.      If you had a laparoscopic surgery, it is not unusual to experience pain/discomfort in your shoulders or under your ribs after surgery.  It is from the gas used during the laparoscopic procedure and usually lasts 1-3 days.  The prescription pain medicine is used to treat the surgical pain and does not typically alleviate this “gassy” pain.     No driving for 24 hours and for as long as you are taking your prescription pain medicine.              You will need to call the office at [phone] to schedule a follow-up appointment in 6-10 days.     Remember to contact me for any of the following:    Fever > 101 degrees  Severe pain that cannot be controlled by taking your pain pills  Severe nausea or vomiting that cannot be controlled by taking your nausea pills  Significant bleeding of your incisions  Drainage that has a bad smell or is yellow or green in appearance  Any other questions or concerns        Additional Instruction for Inguinal Hernia Patients Only    If you did not urinate at the hospital after your surgery or if you feel the need to urinate and cannot, this will necessitate a return to the Emergency Room for placement of a urinary catheter.  You should also notify me as well.  As a rule, you should be able to empty your bladder within 4-6 hours after discharge from the hospital.      You may notice some scrotal bruising and/or swelling. A scrotal support or briefs as well as ice packs may be used to alleviate discomfort.

## 2023-09-18 NOTE — ANESTHESIA PREPROCEDURE EVALUATION
Anesthesia Evaluation     Patient summary reviewed and Nursing notes reviewed   history of anesthetic complications:  PONV  NPO Solid Status: > 8 hours  NPO Liquid Status: > 4 hours           Airway   Mallampati: II  Neck ROM: full  No difficulty expected  Dental - normal exam     Pulmonary     breath sounds clear to auscultation  (+) a smoker Current, COPD,  Cardiovascular     Rhythm: regular    (+) hypertension, past MI , CAD, cardiac stents , hyperlipidemia      Neuro/Psych  (+) seizures, CVA, psychiatric history  GI/Hepatic/Renal/Endo    (+) renal disease dialysis, diabetes mellitus    Musculoskeletal     Abdominal    Substance History   (+) drug use     OB/GYN          Other                        Anesthesia Plan    ASA 3     general     intravenous induction     Anesthetic plan, risks, benefits, and alternatives have been provided, discussed and informed consent has been obtained with: patient.    CODE STATUS:

## 2023-09-18 NOTE — OP NOTE
DATE OF PROCEDURE: 9/18/2023    SURGEON: Vamsi Regan MD     ASSISTANT: Damaris Sanabria PA-C that was in charge of retraction, exposure, holding camera, closing wounds and placing dressings that was essential for success of the case    PREOPERATIVE DIAGNOSES:   1. Chronic kidney disease in need of dialysis.     POSTOPERATIVE DIAGNOSES:   1. Chronic kidney disease in need of dialysis.     PROCEDURES PERFORMED:   1. Laparoscopic insertion of [place] peritoneal dialysis catheter.   2.  Laparoscopic right pantaloon inguinal hernia repair with mesh placement    ANESTHESIA: General.   ESTIMATED BLOOD LOSS: Minimal.   SPECIMEN: None.   IMPLANT: [place] peritoneal dialysis catheter.  3D MID large right-sided Bard mesh  FINDINGS: Good catheter flow.   COMPLICATIONS: None.     INDICATIONS FOR PROCEDURE: The patient is a very pleasant 43 y.o. year old male with chronic kidney disease in need of dialysis.  The patient was evaluated for peritoneal dialysis and was found to be good candidate. I offered the patient laparoscopic peritoneal dialysis catheter placement. The risks and benefits of the procedure were explained to the patient. The patient verbalized understanding and agreed with the plan.     DESCRIPTION OF PROCEDURE: The patient was taken to the operating room and was placed on the OR table in the supine position. Preoperative antibiotics were given and SCD's were placed. General endotracheal anesthesia was then induced. The patient's abdomen was then prepped and draped in the usual sterile fashion. Time out was performed and the patient was correctly identified. I started the procedure by injecting 0.5% Marcaine with epinephrine in the left upper abdomen. With optiview technique a 5 mm trocar was introduced in the patient abdomen.  I then proceeded to insufflate patient's abdomen again, and upon exploration of the abdominal cavity there was no injury from the trocar placement.  There was evidence of indirect right  inguinal hernia.  After calling his father then consented for him to undergo right inguinal hernia repair with mesh placement and after the risk and benefits of this were discussed in detail with him including bleeding, infection, wound complications, mesh infection he decided to consent for his son.  Garcia catheter was placed during the operative room because of bladder distention.  I placed another 8 mm trocar in the right upper umbilical area under direct camera vision.  Another 5 mm trocar in the supraumbilical area was placed.  A third 5 mm trocar in the left lower umbilical area was then placed under direct laparoscopic vision.  I started creating a large preperitoneal plane the start of the level of the anterior superior left spine and went medial all the way to the medial umbilical ligament.  Dissection of the preperitoneal space was performed with examination of blunt dissection and electro coagulation.  The rectus space was dissected and there was evidence of direct as well as a femoral hernia.  Both containing preperitoneal fat.  This was reduced and Michael ligament was dissected.  Then the indirect space was dissected and the peritoneal flap was dissected from the spermatic cord as well as a vast difference.  A large 3D MID medium weight polypropylene mesh was inserted to cover the direct and indirect defects with wide overlap.  The mesh was stuck with a Sorber tacker at the Michael ligament as well as lateral and medial from the epigastric vessels.  The peritoneal flap was then closed with a running 2-0 PDS unidirectional barbed suture.  There was a small rent in the peritoneum that was closed with figure-of-eight and 0 Vicryl.    After this, 0.5% Marcaine was injected in the left  periumbilical area and an incision was performed. An 8 mm trocar was then placed in that position at 60°. It was pointed towards the pelvis. An [place] coiled swan-neck catheter was then placed through the trocar and the trocar  was removed. It was positioned in the retrovesical area. The first cuff was withdrawn back to the level of the rectus muscle fascia. The catheter was then tunnelized and an exit site was selected in the left lower quadrant.I  then proceeded to desufflate the pneumoperitoneum and let 500 mL of heparinized saline run. The fluid flowed without any resistance. The catheter was placed then to gravity and good flow of the fluid was found and 300 mL of heparinized saline was retrieved. The catheter was then capped and pneumoperitoneum was again insufflated. Upon exploration of the abdominal cavity, there was no evidence of injury from the procedure.I then proceeded to remove all the trocars under direct laparoscopic vision.  The 8 mm right periumbilical fascial incision was closed with 0 Vicryl Endo Close technique the catheter was then flushed with 25 mL of heparinized saline without any resistance to the flow. All the incisions were then closed in 2 layers with 3-0 Vicryl and 4-0 Monocryl. The catheter was secured in place with Steri-Strips. A biopatch was placed around the exit site.  All the incisions were covered with 4 x 4 and Tegaderm. The patient was awakened in the operating room and was taken to the recovery area in stable condition.     Vamsi Regan M.D.

## 2023-09-18 NOTE — ANESTHESIA PROCEDURE NOTES
Airway  Urgency: elective    Date/Time: 9/18/2023 11:08 AM  Airway not difficult    General Information and Staff    Patient location during procedure: OR  Anesthesiologist: Benson Ibarra MD  CRNA/CAA: Saúl Gomez CRNA    Indications and Patient Condition  Indications for airway management: airway protection    Preoxygenated: yes  MILS not maintained throughout  Mask difficulty assessment: 2 - vent by mask + OA or adjuvant +/- NMBA    Final Airway Details  Final airway type: endotracheal airway      Successful airway: ETT  Cuffed: yes   Successful intubation technique: video laryngoscopy  Facilitating devices/methods: intubating stylet  Endotracheal tube insertion site: oral  Blade: Kassidy  Blade size: D  ETT size (mm): 7.5  Cormack-Lehane Classification: grade I - full view of glottis  Placement verified by: chest auscultation and capnometry   Cuff volume (mL): 10  Measured from: teeth  ETT/EBT  to teeth (cm): 22  Number of attempts at approach: 2  Assessment: lips, teeth, and gum same as pre-op and atraumatic intubation    Additional Comments  Attempt x1 with MAC 4 grad IV view unsuccessful attempts with bougie, attempt x 2 with CMAC successful, with audibe leak after inflation of ETT cuff, airway exchange with bougie and placement confirmed with CMAC.

## 2023-09-18 NOTE — ANESTHESIA POSTPROCEDURE EVALUATION
"Patient: Jai Gayle Jr.    Procedure Summary       Date: 09/18/23 Room / Location: [facility] OR [address] [place] [address] MAIN OR    Anesthesia Start: 1054 Anesthesia Stop: 1321    Procedure: INSERTION PERITONEAL DIALYSIS CATHETER LAPAROSCOPIC RIGHT INGUINAL HERNIA REPAIR LAPAROSCOPIC WITH MESH (Right: Abdomen) Diagnosis:       ESRD on hemodialysis      (ESRD on hemodialysis [N18.6, Z99.2])    Surgeons: Vamsi Regan MD Provider: Benson Ibarra MD    Anesthesia Type: general ASA Status: 3            Anesthesia Type: general    Vitals  Vitals Value Taken Time   /96 09/18/23 1400   Temp 36.8 °C (98.2 °F) 09/18/23 1317   Pulse 81 09/18/23 1408   Resp 16 09/18/23 1345   SpO2 92 % 09/18/23 1408   Vitals shown include unvalidated device data.        Post Anesthesia Care and Evaluation    Pain management: adequate    Airway patency: patent  Anesthetic complications: No anesthetic complications    Cardiovascular status: acceptable  Respiratory status: acceptable  Hydration status: acceptable    Comments: /95 (BP Location: Right arm, Patient Position: Lying)   Pulse 86   Temp 36.8 °C (98.2 °F) (Oral)   Resp 16   Ht 180.3 cm (71\")   Wt 99.8 kg (220 lb)   SpO2 94%   BMI 30.68 kg/m²         "

## 2023-09-25 ENCOUNTER — TELEPHONE (OUTPATIENT)
Dept: CARDIOLOGY | Facility: CLINIC | Age: 43
End: 2023-09-25
Payer: MEDICARE

## 2023-09-25 NOTE — TELEPHONE ENCOUNTER
Caller: Jai Gayle Jr.    Relationship to patient: Self    Best call back number: [phone]    Chief complaint:  PATIENT HAD A PULMONARY ADEMA AND WAS IN [place]. PATIENT HAD 2 BLOCKAGES AND HAD 2 STENTS PUT IN. PATIENT IS NEEDING TO MAKE AN APPOINTMENT PER [place]     Type of visit: FOLLOW UP    Requested date: ASAP      Additional notes: ONLY AVAILABLE MONDAY WEDNESDAY AND FRIDAY BECAUSE OF DIALYSIS

## 2023-09-25 NOTE — CONSULTS
Chief Complaint   Patient presents with   • Abnormal Lab       Subjective      Jai Gayle Jr. is a 41 y.o. male who is referred by Dr. Sanchez to be evaluated for peritoneal dialysis catheter placement. Patient has CKD secondary to diabetic nephropathy and  is not on dialysis. Patient does have a AV access that was placed 6 weeks ago.  The access is being evaluated by vascular surgery for possible early cannulation.  Patient has not had a recent intraabdominal infection.  He has history of abdominal wall abscess that was found to be MRSA.  He is not a MRSA carrier.  The patient reports having regular bowel movements.  Patient did not have a Colonoscopy.     Home Evaluation: YES    Past Medical History:   Diagnosis Date   • Anxiety    • BPH (benign prostatic hyperplasia)    • CKD (chronic kidney disease)    • COPD (chronic obstructive pulmonary disease) ([facility])    • Diabetes ([facility])    • Heart disease    • Hyperlipidemia    • Hypertension    • Keratosis pilaris    • MI (myocardial infarction) ([facility]) 08/2020   • PONV (postoperative nausea and vomiting)     HAS HAD SURGERY MORE RECENT WITHOUT N/V.       Past Surgical History:   Procedure Laterality Date   • ARTERIOVENOUS FISTULA/SHUNT SURGERY Left 12/15/2021    Procedure: LEFT ARM BRACHIOCEPHALIC ARTERIOVENOUS FISTULA PLACEMENT;  Surgeon: Devon Waller MD;  Location: [facility] OR;  Service: Vascular;  Laterality: Left;   • CARDIAC CATHETERIZATION N/A 8/8/2020    Procedure: Left Heart Cath;  Surgeon: Jason Carvalho MD;  Location: [facility] CATH INVASIVE LOCATION;  Service: Cardiovascular;  Laterality: N/A;   • CARDIAC CATHETERIZATION N/A 8/8/2020    Procedure: Coronary angiography;  Surgeon: Jason Carvalho MD;  Location: [facility] CATH INVASIVE LOCATION;  Service: Cardiovascular;  Laterality: N/A;   • CARDIAC CATHETERIZATION N/A 8/8/2020    Procedure: Left ventriculography;  Surgeon: Jason Carvalho MD;  Location: [facility] INVASIVE  LOCATION;  Service: Cardiovascular;  Laterality: N/A;   • CARDIAC CATHETERIZATION N/A 8/8/2020    Procedure: Stent MEE coronary;  Surgeon: Jason Carvalho MD;  Location: [facility] INVASIVE LOCATION;  Service: Cardiovascular;  Laterality: N/A;   • RENAL BIOPSY Left          Current Facility-Administered Medications:   •  amLODIPine (NORVASC) tablet 10 mg, 10 mg, Oral, Q24H, Nick Peña MD, 10 mg at 02/01/22 0924  •  ARIPiprazole (ABILIFY) tablet 2 mg, 2 mg, Oral, Daily, Mono Roberts MD, 2 mg at 02/01/22 1354  •  aspirin EC tablet 81 mg, 81 mg, Oral, Daily, Mono Roberts MD, 81 mg at 02/01/22 1354  •  atorvastatin (LIPITOR) tablet 40 mg, 40 mg, Oral, Nightly, Mono Roberts MD  •  bumetanide (BUMEX) tablet 1 mg, 1 mg, Oral, Daily, Nick Peña MD, 1 mg at 02/01/22 1353  •  buPROPion SR (WELLBUTRIN SR) 12 hr tablet 200 mg, 200 mg, Oral, BID, Mono Roberts MD, 200 mg at 02/01/22 1355  •  clopidogrel (PLAVIX) tablet 75 mg, 75 mg, Oral, Daily, Mono Roberts MD  •  DULoxetine (CYMBALTA) DR capsule 60 mg, 60 mg, Oral, BID, Mono Roberts MD, 60 mg at 02/01/22 1355  •  famotidine (PEPCID) tablet 20 mg, 20 mg, Oral, BID, Mono Roberts MD, 20 mg at 02/01/22 1354  •  FLUoxetine (PROzac) capsule 20 mg, 20 mg, Oral, Daily, Mono Roberts MD, 20 mg at 02/01/22 1538  •  hydrALAZINE (APRESOLINE) tablet 100 mg, 100 mg, Oral, Q8H, Nick Peña MD, 100 mg at 02/01/22 0923  •  lamoTRIgine (LaMICtal) tablet 200 mg, 200 mg, Oral, BID, Mono Roberts MD, 200 mg at 02/01/22 1354  •  metoprolol tartrate (LOPRESSOR) tablet 25 mg, 25 mg, Oral, Q12H, Nick Peña MD, 25 mg at 02/01/22 0923  •  sevelamer (RENVELA) tablet 1,600 mg, 1,600 mg, Oral, TID With Meals, Nick Peña MD, 1,600 mg at 02/01/22 1818  •  sodium bicarbonate tablet 1,300 mg, 1,300 mg, Oral, TID, Nick Peña MD, 1,300 mg at 02/01/22 1538    No Known Allergies    Family History   Problem Relation Age of Onset   • Hypertension  "Mother    • Hyperlipidemia Mother    • Heart disease Mother    • Heart attack Mother    • Diabetes Mother    • Colon cancer Mother    • Heart failure Neg Hx    • Malig Hyperthermia Neg Hx        Social History     Socioeconomic History   • Marital status: Single   Tobacco Use   • Smoking status: Current Every Day Smoker     Packs/day: 1.50     Years: 34.00     Pack years: 51.00     Types: Cigarettes   • Smokeless tobacco: Never Used   Vaping Use   • Vaping Use: Never used   Substance and Sexual Activity   • Alcohol use: No   • Drug use: No   • Sexual activity: Defer     REVIEW OF SYSTEMS    Review of Systems   Constitutional: Positive for activity change and fatigue. Negative for appetite change.   HENT: Negative for congestion and dental problem.    Eyes: Negative for discharge and itching.   Respiratory: Negative for apnea, cough and choking.    Cardiovascular: Negative for chest pain and leg swelling.   Gastrointestinal: Negative for abdominal distention and abdominal pain.   Endocrine: Negative for cold intolerance and heat intolerance.   Genitourinary: Positive for difficulty urinating. Negative for hematuria.   Musculoskeletal: Negative for arthralgias and back pain.   Skin: Negative for color change and pallor.   Allergic/Immunologic: Negative for environmental allergies and food allergies.   Neurological: Negative for dizziness and facial asymmetry.   Hematological: Negative for adenopathy. Does not bruise/bleed easily.   Psychiatric/Behavioral: Negative for agitation and decreased concentration.       Physical Examination  /93 (BP Location: Right arm, Patient Position: Lying)   Pulse 64   Temp 96.5 °F (35.8 °C)   Resp 16   Ht 182.9 cm (72\")   Wt 95.3 kg (210 lb)   SpO2 98%   BMI 28.48 kg/m²   Body mass index is 28.48 kg/m².  Physical Exam  Constitutional:       Appearance: Normal appearance.   HENT:      Head: Normocephalic and atraumatic.      Nose: Nose normal.      Mouth/Throat:      " Pharynx: Oropharynx is clear.   Eyes:      General: No scleral icterus.     Conjunctiva/sclera: Conjunctivae normal.   Cardiovascular:      Rate and Rhythm: Normal rate and regular rhythm.   Pulmonary:      Effort: Pulmonary effort is normal.      Breath sounds: Normal breath sounds.   Abdominal:      General: Abdomen is flat. Bowel sounds are normal.      Hernia: No hernia is present.   Musculoskeletal:         General: Normal range of motion.      Cervical back: Normal range of motion and neck supple.   Skin:     General: Skin is warm and dry.      Capillary Refill: Capillary refill takes less than 2 seconds.   Neurological:      General: No focal deficit present.      Mental Status: He is alert. Mental status is at baseline.   Psychiatric:         Mood and Affect: Mood normal.         Behavior: Behavior normal.         Labs reviewed:   Platelet aggregation function 82%, white blood cell count 6.9, hemoglobin 10, platelets 168, albumin 2.9, creatinine 7.6, BUN 25    Assessment:   Jai Gayle Jr. is a 41 y.o. male with chronic kidney disease not on hemodialysis.  He has left arm AV fistula with good thrill.  Ultrasound was done by vascular surgery that okay the patient to start hemodialysis.  I discussed with the patient about peritoneal dialysis catheter placement.  He wants to wait until he started getting hemodialysis and see if he is able to do it.    He expressed to me that if he is AV access is functional then he will not be interested in peritoneal dialysis.  41 years old, never had a colonoscopy, has family history of colon cancer in his mom.  He will need to have screening colonoscopy    Plan:     -Awaiting cannulation of hemodialysis access  -We will be available for peritoneal dialysis catheter placement if patient changes mind or dialysis access unable to work properly  -Call my office for colonoscopy scheduling whenever dialysis is figured out        Vamsi Regan MD  General, Minimally  Invasive and Endoscopic Surgery  [facility]    [address], [address]  [place], [place], [address]  P: [phone]  F: [phone]    0 (no pain/absence of nonverbal indicators of pain)

## 2023-11-10 ENCOUNTER — TRANSCRIBE ORDERS (OUTPATIENT)
Dept: ADMINISTRATIVE | Facility: HOSPITAL | Age: 43
End: 2023-11-10
Payer: MEDICARE

## 2023-11-28 ENCOUNTER — OFFICE VISIT (OUTPATIENT)
Dept: SURGERY | Facility: CLINIC | Age: 43
End: 2023-11-28
Payer: MEDICARE

## 2023-11-28 VITALS — SYSTOLIC BLOOD PRESSURE: 142 MMHG | DIASTOLIC BLOOD PRESSURE: 90 MMHG

## 2023-11-28 DIAGNOSIS — Z99.2 PERITONEAL DIALYSIS CATHETER IN PLACE: Primary | ICD-10-CM

## 2023-11-28 NOTE — PROGRESS NOTES
Chief Complaint   Patient presents with    Follow-up     Discuss PD Cath removal        Subjective      Jai Gayle Jr. is a 43 y.o. male who is referred by Dr. Lao for evaluation of peritoneal dialysis catheter removal.  Patient had peritoneal dialysis catheter placed by me on September 2023.  He has been doing fine with a catheter but has not been able to drain extra fluid.  He was switched to hemodialysis.  He will not need his peritoneal dialysis catheter anymore    Past Medical History:   Diagnosis Date    Anxiety     BPH (benign prostatic hyperplasia)     CHF (congestive heart failure) 08/2020    COPD (chronic obstructive pulmonary disease)     Coronary artery disease 08/2020    Heart attack    Depression     Diabetes     ESRD (end stage renal disease)     DIALYSIS TUES,THUR AND SAT    Heart disease     History of seizure     LAST ONE 3 WEEKS AGO    Hyperlipidemia     Hypertension     Keratosis pilaris     MI (myocardial infarction) 08/2020    PONV (postoperative nausea and vomiting)     HAS HAD SURGERY MORE RECENT WITHOUT N/V.    Stroke     Substance abuse 2005       Past Surgical History:   Procedure Laterality Date    ARTERIOVENOUS FISTULA/SHUNT SURGERY Left 12/15/2021    Procedure: LEFT ARM BRACHIOCEPHALIC ARTERIOVENOUS FISTULA PLACEMENT;  Surgeon: Devon Waller MD;  Location: [facility] OR;  Service: Vascular;  Laterality: Left;    CARDIAC CATHETERIZATION N/A 08/08/2020    Procedure: Left Heart Cath;  Surgeon: Jason Carvalho MD;  Location: [facility] CATH INVASIVE LOCATION;  Service: Cardiovascular;  Laterality: N/A;    CARDIAC CATHETERIZATION N/A 08/08/2020    Procedure: Coronary angiography;  Surgeon: Jason Carvalho MD;  Location: [facility] CATH INVASIVE LOCATION;  Service: Cardiovascular;  Laterality: N/A;    CARDIAC CATHETERIZATION N/A 08/08/2020    Procedure: Left ventriculography;  Surgeon: Jason Carvalho MD;  Location: [facility] CATH INVASIVE LOCATION;  Service:  Cardiovascular;  Laterality: N/A;    CARDIAC CATHETERIZATION N/A 08/08/2020    Procedure: Stent MEE coronary;  Surgeon: Jason Carvalho MD;  Location: [facility] CATH INVASIVE LOCATION;  Service: Cardiovascular;  Laterality: N/A;    INSERTION HEMODIALYSIS CATHETER      INSERTION PERITONEAL DIALYSIS CATHETER Right 9/18/2023    Procedure: INSERTION PERITONEAL DIALYSIS CATHETER LAPAROSCOPIC RIGHT INGUINAL HERNIA REPAIR LAPAROSCOPIC WITH MESH;  Surgeon: Vamsi Regan MD;  Location: [facility] MAIN OR;  Service: General;  Laterality: Right;    RENAL BIOPSY Left          Current Outpatient Medications:     amLODIPine (NORVASC) 10 MG tablet, TAKE 1 TABLET BY MOUTH DAILY (Patient taking differently: Take 1 tablet by mouth Daily. TAKES MONDAY WEDNESDAY FRIDAY BID TUES THURS SAT HOLD SUNDAY - AM), Disp: 30 tablet, Rfl: 3    asenapine maleate (SAPHRIS) 10 MG sublingual tablet sublingual tablet, Place 2 tablets under the tongue Every Night., Disp: , Rfl:     aspirin 81 MG EC tablet, Take 1 tablet by mouth Daily., Disp: 30 tablet, Rfl: 5    buPROPion SR (WELLBUTRIN SR) 200 MG 12 hr tablet, Take 1 tablet by mouth 2 (Two) Times a Day., Disp: , Rfl:     DULoxetine (CYMBALTA) 60 MG capsule, Take 1 capsule by mouth 2 (Two) Times a Day., Disp: , Rfl:     FLUoxetine (PROzac) 20 MG tablet, Take 5 tablets by mouth Daily., Disp: , Rfl:     lamoTRIgine (LaMICtal) 200 MG tablet, Take 1 tablet by mouth Daily., Disp: , Rfl:     metoprolol tartrate (LOPRESSOR) 25 MG tablet, TAKE 1 TABLET BY MOUTH EVERY 12 HOURS (Patient taking differently: 2 tablets.), Disp: 180 tablet, Rfl: 1    ondansetron (ZOFRAN) 4 MG tablet, Take 1 tablet by mouth Every 6 (Six) Hours., Disp: , Rfl:     sevelamer (RENVELA) 800 MG tablet, Take 2 tablets by mouth 3 (Three) Times a Day., Disp: 180 tablet, Rfl: 11    torsemide (DEMADEX) 100 MG tablet, Take 2 tablets by mouth 2 (Two) Times a Day., Disp: , Rfl:     hydrALAZINE (APRESOLINE) 100 MG tablet, Take 1 tablet  by mouth 3 (Three) Times a Day. TAKES BID ON TUES. THURS AND SAT., Disp: , Rfl:     No Known Allergies    Family History   Problem Relation Age of Onset    Hypertension Mother     Hyperlipidemia Mother     Heart disease Mother     Heart attack Mother     Diabetes Mother     Colon cancer Mother     Cancer Mother     COPD Father     Heart failure Neg Hx     Malig Hyperthermia Neg Hx        Social History     Socioeconomic History    Marital status: Single   Tobacco Use    Smoking status: Every Day     Packs/day: 0.25     Years: 39.00     Additional pack years: 0.00     Total pack years: 9.75     Types: Cigarettes    Smokeless tobacco: Never    Tobacco comments:     Smoking since 13yo. Recently cut back greatly from March 2023   Vaping Use    Vaping Use: Never used   Substance and Sexual Activity    Alcohol use: No    Drug use: Not Currently     Types: Amphetamines, Cocaine(coke), Ketamine, LSD, Marijuana, MDMA (ecstacy)     Comment: CLEAN FOR 2011    Sexual activity: Not Currently     Partners: Female     Birth control/protection: Condom     REVIEW OF SYSTEMS    Review of Systems   Constitutional:  Negative for activity change and appetite change.   HENT:  Negative for congestion and nosebleeds.    Eyes:  Negative for discharge and itching.   Respiratory:  Negative for apnea, cough and choking.    Cardiovascular:  Negative for chest pain and leg swelling.   Gastrointestinal:  Negative for abdominal distention and blood in stool.   Endocrine: Negative for cold intolerance and heat intolerance.   Genitourinary:  Negative for difficulty urinating and dysuria.   Musculoskeletal:  Negative for arthralgias and back pain.   Allergic/Immunologic: Negative for environmental allergies and food allergies.   Neurological:  Negative for dizziness and headaches.   Hematological:  Negative for adenopathy. Does not bruise/bleed easily.   Psychiatric/Behavioral:  Negative for agitation and behavioral problems.        Physical  Examination  /90 (BP Location: Right arm, Patient Position: Sitting, Cuff Size: Adult)   There is no height or weight on file to calculate BMI.  Physical Exam  Constitutional:       Appearance: Normal appearance.   HENT:      Head: Normocephalic and atraumatic.      Nose: Nose normal.      Mouth/Throat:      Mouth: Mucous membranes are moist.   Eyes:      General: No scleral icterus.     Conjunctiva/sclera: Conjunctivae normal.   Cardiovascular:      Pulses: Normal pulses.   Pulmonary:      Effort: Pulmonary effort is normal.      Breath sounds: Normal breath sounds.   Abdominal:      General: Bowel sounds are normal. There is no distension.      Palpations: Abdomen is soft. There is no mass.      Tenderness: There is no abdominal tenderness.      Hernia: No hernia is present.          Comments: Peritoneal dialysis catheter in place in the left mid abdomen   Musculoskeletal:         General: Normal range of motion.      Cervical back: Normal range of motion and neck supple.   Skin:     General: Skin is warm and dry.      Capillary Refill: Capillary refill takes less than 2 seconds.   Neurological:      General: No focal deficit present.      Mental Status: He is alert. Mental status is at baseline.         Labs 10/30/2023: Hemoglobin 8, blood cell count 6.4, hematocrit 24.9, platelets 130    Assessment:   Jai Gayle Jr. is a 43 y.o. male with ESRD on hemodialysis that has peritoneal dialysis catheter in place but will not be using it anymore.  He will need peritoneal dialysis catheter removal.  I offer him peritoneal dialysis catheter removal under moderate sedation risk and benefits of procedure including bleeding, infection, wound complications discussed in detail with the patient that verbalized understanding and agree with plan      Plan:     -Peritoneal dialysis catheter removal under moderate sedation    Orders Placed This Encounter   Procedures    Obtain Informed Consent     Standing Status:    Future     Order Specific Question:   Informed Consent Given For     Answer:   Peritoneal dialysis catheter removal    Provide NPO Instructions to Patient     Standing Status:   Future    Chlorhexidine Skin Prep     Chlorhexidine Skin Prep and Instructions For All Patients Having A Procedure Requiring an Outward Incision if Not Allergic. If Allergic, Give Antibacterial Skin Wipes and Instructions. Do Not Use For Facial Cases or on Any Mucus Membranes.     Standing Status:   Future         Vamsi Regan MD  General, Minimally Invasive and Endoscopic Surgery  [facility]    [address] [place] [address], [address]  [place], [place], [facility]  P: [phone]  F: [phone]

## 2023-12-08 ENCOUNTER — TELEPHONE (OUTPATIENT)
Dept: SURGERY | Facility: CLINIC | Age: 43
End: 2023-12-08
Payer: MEDICARE

## 2023-12-08 NOTE — TELEPHONE ENCOUNTER
I phoned the patient to let him know that he had missed his PAT appt on 12/7. His father answered his phone and informed me that the patient had a cardiac event and that he was unresponsive at this time. Records can be found in [facility].

## (undated) DEVICE — TRAP FLD MINIVAC MEGADYNE 100ML

## (undated) DEVICE — DEV INDEFLATOR

## (undated) DEVICE — GLIDESHEATH BASIC HYDROPHILIC COATED INTRODUCER SHEATH: Brand: GLIDESHEATH

## (undated) DEVICE — SUT PROLN 6/0 BV1 D/A 30IN 8709H

## (undated) DEVICE — ENDOPATH XCEL UNIVERSAL TROCAR STABLILITY SLEEVES: Brand: ENDOPATH XCEL

## (undated) DEVICE — DRSNG SURESITE WNDW 2.38X2.75

## (undated) DEVICE — ENDOCUT SCISSOR TIP, DISPOSABLE: Brand: RENEW

## (undated) DEVICE — GW EMR FIX EXCHG J STD .035 3MM 260CM

## (undated) DEVICE — Device

## (undated) DEVICE — SOL NACL 0.9PCT 1000ML

## (undated) DEVICE — STPLR SKIN VISISTAT WD 35CT

## (undated) DEVICE — SUT SILK 3/0 TIES 18IN A184H

## (undated) DEVICE — SOL NS 500ML

## (undated) DEVICE — ENDOPATH XCEL BLADELESS TROCARS WITH STABILITY SLEEVES: Brand: ENDOPATH XCEL

## (undated) DEVICE — SPNG GZ WOVN 4X4IN 12PLY 10/BX STRL

## (undated) DEVICE — SUT PROLN 0 CT1 30IN 8424H

## (undated) DEVICE — GLV SURG BIOGEL LTX PF 7

## (undated) DEVICE — 6F .070 JR 4 100CM: Brand: CORDIS

## (undated) DEVICE — ELECTRD BLD EZ CLN MOD XLNG 2.75IN

## (undated) DEVICE — PK CATH CARD 40

## (undated) DEVICE — GOWN,NON-REINFORCED,SIRUS,SET IN SLV,XL: Brand: MEDLINE

## (undated) DEVICE — LAPAROSCOPIC SMOKE FILTRATION SYSTEM: Brand: PALL LAPAROSHIELD® PLUS LAPAROSCOPIC SMOKE FILTRATION SYSTEM

## (undated) DEVICE — SUT SILK 3/0 SH CR5 18IN C0135

## (undated) DEVICE — ST IRR CYSTO W/SPK 77IN LF

## (undated) DEVICE — PENCL E/S ULTRAVAC TELESCP NOSE HOLSTR 10FT

## (undated) DEVICE — 3M™ STERI-STRIP™ COMPOUND BENZOIN TINCTURE 40 BAGS/CARTON 4 CARTONS/CASE C1544: Brand: 3M™ STERI-STRIP™

## (undated) DEVICE — 2, DISPOSABLE SUCTION/IRRIGATOR WITH DISPOSABLE TIP: Brand: STRYKEFLOW

## (undated) DEVICE — APPL CHLORAPREP HI/LITE 26ML ORNG

## (undated) DEVICE — BANDAGE,GAUZE,BULKEE II,4.5"X4.1YD,STRL: Brand: MEDLINE

## (undated) DEVICE — SUT MNCRYL PLS ANTIB UD 4/0 PS2 18IN

## (undated) DEVICE — LOU LAP CHOLE: Brand: MEDLINE INDUSTRIES, INC.

## (undated) DEVICE — KT MANIFLD CARDIAC

## (undated) DEVICE — DISPOSABLE MONOPOLAR ENDOSCOPIC CORD 10 FT. (3M): Brand: KIRWAN

## (undated) DEVICE — BNDR ABD 4PANEL 12IN 46 TO 62IN

## (undated) DEVICE — RUNTHROUGH NS EXTRA FLOPPY PTCA GUIDEWIRE: Brand: RUNTHROUGH

## (undated) DEVICE — SUT SILK 2/0 TIES 18IN A185H

## (undated) DEVICE — BIOPATCH™ ANTIMICROBIAL DRESSING WITH CHLORHEXIDINE GLUCONATE IS A HYDROPHILLIC POLYURETHANE ABSORPTIVE FOAM WITH CHLORHEXIDINE GLUCONATE (CHG) WHICH INHIBITS BACTERIAL GROWTH UNDER THE DRESSING. THE DRESSING IS INTENDED TO BE USED TO ABSORB EXUDATE, COVER A WOUND CAUSED BY VASCULAR AND NONVASCULAR PERCUTANEOUS MEDICAL DEVICES DURING SURGERY, AS WELL AS REDUCE LOCAL INFECTION AND COLONIZATION OF MICROORGANISMS.: Brand: BIOPATCH

## (undated) DEVICE — TBG PENCL TELESCP MEGADYNE SMOKE EVAC 10FT

## (undated) DEVICE — DRSNG SURESITE WNDW 4X4.5

## (undated) DEVICE — LAPAROVUE VISIBILITY SYSTEM LAPAROSCOPIC SOLUTIONS: Brand: LAPAROVUE

## (undated) DEVICE — CATH VENT MIV RADL PIG ST TIP 5F 110CM

## (undated) DEVICE — ANTIBACTERIAL UNDYED BRAIDED (POLYGLACTIN 910), SYNTHETIC ABSORBABLE SUTURE: Brand: COATED VICRYL

## (undated) DEVICE — TREK CORONARY DILATATION CATHETER 2.50 MM X 12 MM / RAPID-EXCHANGE: Brand: TREK

## (undated) DEVICE — DEV TUNNELING SUBCONTANIOUS

## (undated) DEVICE — NDL HYPO PRECISIONGLIDE REG 25G 1 1/2

## (undated) DEVICE — CATH DIAG IMPULSE FL3.5 5F 100CM

## (undated) DEVICE — CATH DIAG IMPULSE AL1 6F 100CM

## (undated) DEVICE — SYR LUERLOK 30CC

## (undated) DEVICE — GLV SURG BIOGEL LTX PF 7 1/2

## (undated) DEVICE — PK AV FISTL/SHNT 40

## (undated) DEVICE — PATIENT RETURN ELECTRODE, SINGLE-USE, CONTACT QUALITY MONITORING, ADULT, WITH 9FT CORD, FOR PATIENTS WEIGING OVER 33LBS. (15KG): Brand: MEGADYNE